# Patient Record
Sex: FEMALE | Race: BLACK OR AFRICAN AMERICAN | Employment: FULL TIME | URBAN - METROPOLITAN AREA
[De-identification: names, ages, dates, MRNs, and addresses within clinical notes are randomized per-mention and may not be internally consistent; named-entity substitution may affect disease eponyms.]

---

## 2017-03-07 ENCOUNTER — OFFICE VISIT (OUTPATIENT)
Dept: FAMILY MEDICINE CLINIC | Age: 52
End: 2017-03-07

## 2017-03-07 VITALS
OXYGEN SATURATION: 99 % | DIASTOLIC BLOOD PRESSURE: 80 MMHG | SYSTOLIC BLOOD PRESSURE: 136 MMHG | BODY MASS INDEX: 32.75 KG/M2 | WEIGHT: 241.8 LBS | RESPIRATION RATE: 18 BRPM | HEIGHT: 72 IN | TEMPERATURE: 97.7 F | HEART RATE: 55 BPM

## 2017-03-07 DIAGNOSIS — E03.9 HYPOTHYROIDISM, ADULT: Primary | ICD-10-CM

## 2017-03-07 DIAGNOSIS — K21.9 GASTROESOPHAGEAL REFLUX DISEASE WITHOUT ESOPHAGITIS: ICD-10-CM

## 2017-03-07 DIAGNOSIS — E78.5 DYSLIPIDEMIA: ICD-10-CM

## 2017-03-07 DIAGNOSIS — E55.9 VITAMIN D DEFICIENCY: ICD-10-CM

## 2017-03-07 NOTE — MR AVS SNAPSHOT
Visit Information     Date & Time Provider Department Dept. Phone Encounter #    3/7/2017 10:20 AM Phill Vergara MD [address] [place] [address] [id number]      Follow-up Instructions     Return in about 6 months (around 9/7/2017) for Follow-up, Fasting, CHOL, Thyroid. Upcoming Health Maintenance        Date Due    Hepatitis C Screening 1965    DTaP/Tdap/Td series (1 - Tdap) 1/1/1984    PAP AKA CERVICAL CYTOLOGY 1/1/1984    FOBT Q 1 YEAR AGE 50-75 1/1/2013    INFLUENZA AGE 9 TO ADULT 8/1/2016    BREAST CANCER SCRN MAMMOGRAM 12/28/2017      Allergies as of 3/7/2017  Review Complete On: 3/7/2017 By: Anika Hawkins MD    No Known Allergies      Current Immunizations  Reviewed on 10/31/2012    Name Date    Influenza Vaccine Split 10/31/2012       Not reviewed this visit   You Were Diagnosed With        Codes Comments    Hypothyroidism, adult    -  Primary ICD-10-CM: E03.9  ICD-9-CM: 263. 9     Dyslipidemia     ICD-10-CM: E78.5  ICD-9-CM: 272.4     Gastroesophageal reflux disease without esophagitis     ICD-10-CM: K21.9  ICD-9-CM: 530.81     Vitamin D deficiency     ICD-10-CM: E55.9  ICD-9-CM: 268.9       Vitals     BP Pulse Temp Resp Height(growth percentile) Weight(growth percentile)    136/80 (BP 1 Location: Left arm, BP Patient Position: Sitting) (!) 55 97.7 °F (36.5 °C) (Oral) 18 6' 4\" (1.93 m) 241 lb 12.8 oz (109.7 kg)    SpO2 BMI OB Status Smoking Status          99% 29.43 kg/m2 Postmenopausal Never Smoker      Vitals History      BMI and BSA Data     Body Mass Index Body Surface Area    29.43 kg/m 2 2.43 m 2         Preferred Pharmacy       Pharmacy Name Phone    [address] [phone]         Your Updated Medication List          This list is accurate as of: 3/7/17 10:55 AM.  Always use your most recent med list.                diclofenac EC 75 mg EC tablet   Commonly known as:  VOLTAREN   Take 1 Tab by mouth two (2) times daily (with meals). levothyroxine 75 mcg tablet   Commonly known as:  SYNTHROID   Take 1 Tab by mouth Daily (before breakfast). omeprazole 20 mg capsule   Commonly known as:  PRILOSEC   Take 1 Cap by mouth Daily (before breakfast). simvastatin 40 mg tablet   Commonly known as:  ZOCOR   TAKE ONE TABLET BY MOUTH NIGHTLY. VITAMIN D3 2,000 unit Tab   Generic drug:  cholecalciferol (vitamin D3)   Take  by mouth. Follow-up Instructions     Return in about 6 months (around 9/7/2017) for Follow-up, Fasting, CHOL, Thyroid. Patient Instructions         High Cholesterol: Care Instructions  Your Care Instructions  Cholesterol is a type of fat in your blood. It is needed for many body functions, such as making new cells. Cholesterol is made by your body. It also comes from food you eat. High cholesterol means that you have too much of the fat in your blood. This raises your risk of a heart attack and stroke. LDL and HDL are part of your total cholesterol. LDL is the \"bad\" cholesterol. High LDL can raise your risk for heart disease, heart attack, and stroke. HDL is the \"good\" cholesterol. It helps clear bad cholesterol from the body. High HDL is linked with a lower risk of heart disease, heart attack, and stroke. Your cholesterol levels help your doctor find out your risk for having a heart attack or stroke. You and your doctor can talk about whether you need to lower your risk and what treatment is best for you. A heart-healthy lifestyle along with medicines can help lower your cholesterol and your risk. The way you choose to lower your risk will depend on how high your risk is for heart attack and stroke. It will also depend on how you feel about taking medicines. Follow-up care is a key part of your treatment and safety. Be sure to make and go to all appointments, and call your doctor if you are having problems.  It's also a good idea to know your test results and keep a list of the medicines you take. How can you care for yourself at home? · Eat a variety of foods every day. Good choices include fruits, vegetables, whole grains (like oatmeal), dried beans and peas, nuts and seeds, soy products (like tofu), and fat-free or low-fat dairy products. · Replace butter, margarine, and hydrogenated or partially hydrogenated oils with olive and canola oils. (Canola oil margarine without trans fat is fine.)  · Replace red meat with fish, poultry, and soy protein (like tofu). · Limit processed and packaged foods like chips, crackers, and cookies. · Bake, broil, or steam foods. Don't ramirez them. · Be physically active. Get at least 30 minutes of exercise on most days of the week. Walking is a good choice. You also may want to do other activities, such as running, swimming, cycling, or playing tennis or team sports. · Stay at a healthy weight or lose weight by making the changes in eating and physical activity listed above. Losing just a small amount of weight, even 5 to 10 pounds, can reduce your risk for having a heart attack or stroke. · Do not smoke. When should you call for help? Watch closely for changes in your health, and be sure to contact your doctor if:  · You need help making lifestyle changes. · You have questions about your medicine. Where can you learn more? Go to http://tc-soniya.info/. Enter S176 in the search box to learn more about \"High Cholesterol: Care Instructions. \"  Current as of: January 27, 2016  Content Version: 11.1  © 5845-7076 [employer]. Care instructions adapted under license by [email or web address] (which disclaims liability or warranty for this information). If you have questions about a medical condition or this instruction, always ask your healthcare professional. [address] any warranty or liability for your use of this information.     PLEASE CONTINUE WITH  LEVOTHYROXINE AND SIMVASTATIN     STOP DICLOFENAC AND OMEPRAZOLE        Introducing [employer]! Jamaal Ace introduces Cibiemt patient portal. Now you can access parts of your medical record, email your doctor's office, and request medication refills online. 1. In your internet browser, go to [email or web address]   2. Click on the First Time User? Click Here link in the Sign In box. You will see the New Member Sign Up page. 3. Enter your CLEAR Access Code exactly as it appears below. You will not need to use this code after youve completed the sign-up process. If you do not sign up before the expiration date, you must request a new code. · CLEAR Access Code: [email or web address]  Expires: 6/5/2017 10:07 AM    4. Enter the last four digits of your Social Security Number (xxxx) and Date of Birth (mm/dd/yyyy) as indicated and click Submit. You will be taken to the next sign-up page. 5. Create a CLEAR ID. This will be your CLEAR login ID and cannot be changed, so think of one that is secure and easy to remember. 6. Create a CLEAR password. You can change your password at any time. 7. Enter your Password Reset Question and Answer. This can be used at a later time if you forget your password. 8. Enter your e-mail address. You will receive e-mail notification when new information is available in [address] 9. Click Sign Up. You can now view and download portions of your medical record. 10. Click the Download Summary menu link to download a portable copy of your medical information. If you have questions, please visit the Frequently Asked Questions section of the CLEAR website. Remember, CLEAR is NOT to be used for urgent needs. For medical emergencies, dial 911. Now available from your iPhone and Android! Please provide this summary of care documentation to your next provider. Your primary care clinician is listed as Shelly Varma.  If you have any questions after today's visit, please call [phone].

## 2017-03-07 NOTE — PROGRESS NOTES
HISTORY OF PRESENT ILLNESS  Erick Raymundo is a 45 y.o. female. Cholesterol Problem   The history is provided by the patient. This is a chronic problem. Progression since onset: On Simvastatin and she has been able to tolerate the medication without any notable side effects. Thyroid Problem   The history is provided by the patient. This is a chronic problem. Progression since onset: On thyroid replacement . weight has been steady and she has no other symptoms . Compliant with medications . Ear Fullness   The history is provided by the patient. This is a new problem. Progression since onset: had a cold 3 weeks ago associated with sore throat and left earache . Now has sense of fullness in left ear . Diet Concern   The history is provided by the patient. This is a chronic problem. Progression since onset: On Vit D supplementation . Review of Systems   Constitutional: Negative. HENT: Negative. Eyes: Negative. Respiratory: Negative. Cardiovascular: Negative. Gastrointestinal: Negative. Genitourinary: Negative. Musculoskeletal: Negative. Skin: Negative. Neurological: Negative. Endo/Heme/Allergies: Negative. Psychiatric/Behavioral: Negative. Physical Exam  General:   Head: Alert, cooperative, no distress, appears stated age. Normocephalic and atraumatic   Eyes:  Conjunctivae/corneas clear. PERRL, EOMs intact. Ears:  Normal TMs and external ear canals both ears. Nose: Nares normal. Septum midline. Mucosa normal. No drainage or sinus tenderness. Mouth:  Throat: Lips, mucosa, and tongue normal. Teeth and gums normal.  No lesions or exudates. Neck: Supple, symmetrical, trachea midline, no adenopathy, thyroid: no enlargement/tenderness/nodules. Back:   Symmetric, no curvature. ROM normal. No CVA tenderness. Lungs:   Clear to auscultation bilaterally. Heart:  Regular rate and rhythm, S1, S2 normal, no murmur, click, rub or gallop.    Abdomen:   Soft, non-tender. Bowel sounds normal. No masses,  No organomegaly. Extremities: Extremities normal, atraumatic, no cyanosis or edema. Pulses: 2+ and symmetric all extremities. Skin: Skin color, texture, turgor normal. No rashes or lesions   Lymph nodes: Cervical & supraclavicular nodes normal.   Neurologic: CNII-XII intact. Normal strength, sensation and reflexes throughout. Psych:                      Normal mood and affect     ASSESSMENT and PLAN  Encounter Diagnoses   Name Primary?  Hypothyroidism, adult Yes    Dyslipidemia     Gastroesophageal reflux disease without esophagitis     Vitamin D deficiency    Follow-up Disposition:  Return in about 6 months (around 9/7/2017) for Follow-up, Fasting, CHOL, Thyroid. Reviewed and discussed previous lab results. She was advised to continue with current medications. She was given an after visit summary which includes diagnoses, vital signs, current medications, &  authorized prescriptions. Patient verbalized understanding.

## 2017-03-07 NOTE — PATIENT INSTRUCTIONS

## 2017-03-07 NOTE — PROGRESS NOTES
Irais Goodson is a 45 y.o. female  Chief Complaint   Patient presents with    Cholesterol Problem    Thyroid Problem    Vitamin D Deficiency     1. Have you been to the ER, urgent care clinic since your last visit? Hospitalized since your last visit? No    2. Have you seen or consulted any other health care providers outside of the [address] [place] [address] since your last visit? Include any pap smears or colon screening.   No

## 2017-05-18 ENCOUNTER — TELEPHONE (OUTPATIENT)
Dept: FAMILY MEDICINE CLINIC | Age: 52
End: 2017-05-18

## 2017-05-18 NOTE — TELEPHONE ENCOUNTER
Shania Vargas  [phone]    Patient states that she was sent home from work because she was having  chills and her boss thinks that she has a fever. She states that she has a cold and her nose is running. Patient was informed that Dr. Vernon Mcdonald schedule is full today and referred to the [facility]. Patient declined going to the [facility] and asked that Dr. Elan Machado or her nurse call her. Patient speaks broken english.

## 2017-05-18 NOTE — TELEPHONE ENCOUNTER
Writer returned call to patient, advised patient of no available appointments for today. Requested patient to call the office tomorrow morning at 7 am for a possible same day appointment, patient understood and agreed to understanding.

## 2017-05-19 ENCOUNTER — OFFICE VISIT (OUTPATIENT)
Dept: FAMILY MEDICINE CLINIC | Age: 52
End: 2017-05-19

## 2017-05-19 VITALS
BODY MASS INDEX: 32.29 KG/M2 | TEMPERATURE: 98.7 F | HEART RATE: 68 BPM | RESPIRATION RATE: 18 BRPM | OXYGEN SATURATION: 99 % | SYSTOLIC BLOOD PRESSURE: 116 MMHG | WEIGHT: 238.4 LBS | HEIGHT: 72 IN | DIASTOLIC BLOOD PRESSURE: 72 MMHG

## 2017-05-19 DIAGNOSIS — E55.9 VITAMIN D DEFICIENCY: ICD-10-CM

## 2017-05-19 DIAGNOSIS — J02.9 PHARYNGITIS, UNSPECIFIED ETIOLOGY: Primary | ICD-10-CM

## 2017-05-19 DIAGNOSIS — Z29.8 NEED FOR MALARIA PROPHYLAXIS: ICD-10-CM

## 2017-05-19 DIAGNOSIS — E03.9 HYPOTHYROIDISM, ADULT: ICD-10-CM

## 2017-05-19 DIAGNOSIS — M25.561 BILATERAL ANTERIOR KNEE PAIN: ICD-10-CM

## 2017-05-19 DIAGNOSIS — E78.5 HYPERLIPIDEMIA LDL GOAL <100: ICD-10-CM

## 2017-05-19 DIAGNOSIS — M25.562 BILATERAL ANTERIOR KNEE PAIN: ICD-10-CM

## 2017-05-19 RX ORDER — MEFLOQUINE HYDROCHLORIDE 250 MG/1
250 TABLET ORAL
Qty: 10 TAB | Refills: 0 | Status: SHIPPED | OUTPATIENT
Start: 2017-05-19 | End: 2018-02-07 | Stop reason: ALTCHOICE

## 2017-05-19 RX ORDER — NAPROXEN 500 MG/1
500 TABLET ORAL 2 TIMES DAILY WITH MEALS
Qty: 60 TAB | Refills: 1 | Status: SHIPPED | OUTPATIENT
Start: 2017-05-19 | End: 2018-02-07 | Stop reason: ALTCHOICE

## 2017-05-19 NOTE — MR AVS SNAPSHOT
Visit Information     Date & Time Provider Department Dept. Phone Encounter #    5/19/2017 12:15 PM Randy Wynn  [facility] [phone] [id number]      Upcoming Health Maintenance        Date Due    Hepatitis C Screening 1965    DTaP/Tdap/Td series (1 - Tdap) 1/1/1984    PAP AKA CERVICAL CYTOLOGY 1/1/1984    FOBT Q 1 YEAR AGE 50-75 1/1/2013    INFLUENZA AGE 9 TO ADULT 8/1/2017    BREAST CANCER SCRN MAMMOGRAM 12/28/2017      Allergies as of 5/19/2017  Review Complete On: 5/19/2017 By: Slava Nunez LPN    No Known Allergies      Current Immunizations  Reviewed on 10/31/2012    Name Date    Influenza Vaccine Split 10/31/2012       Not reviewed this visit   Vitals     BP Pulse Temp Resp Height(growth percentile) Weight(growth percentile)    116/72 (BP 1 Location: Left arm, BP Patient Position: Sitting) 68 98.7 °F (37.1 °C) (Oral) 18 6' 4\" (1.93 m) 238 lb 6.4 oz (108.1 kg)    SpO2 BMI OB Status Smoking Status          99% 29.02 kg/m2 Postmenopausal Never Smoker        BMI and BSA Data     Body Mass Index Body Surface Area    29.02 kg/m 2 2.41 m 2         Preferred Pharmacy       Pharmacy Name Phone    [address] [phone]         Your Updated Medication List          This list is accurate as of: 5/19/17  1:22 PM.  Always use your most recent med list.                diclofenac EC 75 mg EC tablet   Commonly known as:  VOLTAREN   Take 1 Tab by mouth two (2) times daily (with meals). levothyroxine 75 mcg tablet   Commonly known as:  SYNTHROID   Take 1 Tab by mouth Daily (before breakfast). omeprazole 20 mg capsule   Commonly known as:  PRILOSEC   Take 1 Cap by mouth Daily (before breakfast). simvastatin 40 mg tablet   Commonly known as:  ZOCOR   TAKE ONE TABLET BY MOUTH NIGHTLY. VITAMIN D3 2,000 unit Tab   Generic drug:  cholecalciferol (vitamin D3)   Take  by mouth.                Patient Instructions         Back Pain: Care Instructions  Your Care Instructions    Back pain has many possible causes. It is often related to problems with muscles and ligaments of the back. It may also be related to problems with the nerves, discs, or bones of the back. Moving, lifting, standing, sitting, or sleeping in an awkward way can strain the back. Sometimes you don't notice the injury until later. Arthritis is another common cause of back pain. Although it may hurt a lot, back pain usually improves on its own within several weeks. Most people recover in 12 weeks or less. Using good home treatment and being careful not to stress your back can help you feel better sooner. Follow-up care is a key part of your treatment and safety. Be sure to make and go to all appointments, and call your doctor if you are having problems. Its also a good idea to know your test results and keep a list of the medicines you take. How can you care for yourself at home? · Sit or lie in positions that are most comfortable and reduce your pain. Try one of these positions when you lie down:  ¨ Lie on your back with your knees bent and supported by large pillows. ¨ Lie on the floor with your legs on the seat of a sofa or chair. Karla Orn on your side with your knees and hips bent and a pillow between your legs. ¨ Lie on your stomach if it does not make pain worse. · Do not sit up in bed, and avoid soft couches and twisted positions. Bed rest can help relieve pain at first, but it delays healing. Avoid bed rest after the first day of back pain. · Change positions every 30 minutes. If you must sit for long periods of time, take breaks from sitting. Get up and walk around, or lie in a comfortable position. · Try using a heating pad on a low or medium setting for 15 to 20 minutes every 2 or 3 hours. Try a warm shower in place of one session with the heating pad. · You can also try an ice pack for 10 to 15 minutes every 2 to 3 hours.  Put a thin cloth between the ice pack and your skin. · Take pain medicines exactly as directed. ¨ If the doctor gave you a prescription medicine for pain, take it as prescribed. ¨ If you are not taking a prescription pain medicine, ask your doctor if you can take an over-the-counter medicine. · Take short walks several times a day. You can start with 5 to 10 minutes, 3 or 4 times a day, and work up to longer walks. Walk on level surfaces and avoid hills and stairs until your back is better. · Return to work and other activities as soon as you can. Continued rest without activity is usually not good for your back. · To prevent future back pain, do exercises to stretch and strengthen your back and stomach. Learn how to use good posture, safe lifting techniques, and proper body mechanics. When should you call for help? Call your doctor now or seek immediate medical care if:  · You have new or worsening numbness in your legs. · You have new or worsening weakness in your legs. (This could make it hard to stand up.)  · You lose control of your bladder or bowels. Watch closely for changes in your health, and be sure to contact your doctor if:  · Your pain gets worse. · You are not getting better after 2 weeks. Where can you learn more? Go to http://tc-soniya.info/. Enter O333 in the search box to learn more about \"Back Pain: Care Instructions. \"  Current as of: May 23, 2016  Content Version: 11.2  © 4750-6219 [employer]. Care instructions adapted under license by [employer] (which disclaims liability or warranty for this information). If you have questions about a medical condition or this instruction, always ask your healthcare professional. [address] any warranty or liability for your use of this information. Introducing [facility]!      Marcial Clark introduces VR1 patient portal. Now you can access parts of your medical record, email your doctor's office, and request medication refills online. 1. In your internet browser, go to [email or web address]   2. Click on the First Time User? Click Here link in the Sign In box. You will see the New Member Sign Up page. 3. Enter your Meilishuo Access Code exactly as it appears below. You will not need to use this code after youve completed the sign-up process. If you do not sign up before the expiration date, you must request a new code. · Meilishuo Access Code: [email or web address]  Expires: 6/5/2017 11:07 AM    4. Enter the last four digits of your Social Security Number (xxxx) and Date of Birth (mm/dd/yyyy) as indicated and click Submit. You will be taken to the next sign-up page. 5. Create a Meilishuo ID. This will be your Meilishuo login ID and cannot be changed, so think of one that is secure and easy to remember. 6. Create a Meilishuo password. You can change your password at any time. 7. Enter your Password Reset Question and Answer. This can be used at a later time if you forget your password. 8. Enter your e-mail address. You will receive e-mail notification when new information is available in [address] 9. Click Sign Up. You can now view and download portions of your medical record. 10. Click the Download Summary menu link to download a portable copy of your medical information. If you have questions, please visit the Frequently Asked Questions section of the Meilishuo website. Remember, Meilishuo is NOT to be used for urgent needs. For medical emergencies, dial 911. Now available from your iPhone and Android! Please provide this summary of care documentation to your next provider. Your primary care clinician is listed as Avani Howard. If you have any questions after today's visit, please call [phone].

## 2017-05-19 NOTE — PROGRESS NOTES
HISTORY OF PRESENT ILLNESS  HPI  Erin Dotson is a 45 y.o. Female with history of hypothyroidism, hyperlipidemia, and vitamin D deficiency who presents to office today for sore throat. Pt complains of sore throat, congestion, and rhinorrhea since 5/17/17, and she states that she had a fever yesterday. She has been taking DayQuil, NyQuil, and Tylenol with relief. Pt denies cough. She also complains of left abdominal pain that is not aggravated by movement or palpation and is worse when sitting down, as well as mid-back pain; these both started around the same time as her sore throat. She complains of bilateral knee pain for the past two weeks that is aggravated by standing up and walking and is worse right after waking up in the morning. She notes that she exercises twice a week, usually with walking, but that her activity level has not changed recently. Past Medical History:   Diagnosis Date    Anemia     resolved after tx     H. PYLORI INFECTION     tx in  2008 no stool Ag to confirm tx    Hypercholesterolemia     Hypertension      Past Surgical History:   Procedure Laterality Date    HX APPENDECTOMY       Current Outpatient Prescriptions on File Prior to Visit   Medication Sig Dispense Refill    levothyroxine (SYNTHROID) 75 mcg tablet Take 1 Tab by mouth Daily (before breakfast). 30 Tab 12    simvastatin (ZOCOR) 40 mg tablet TAKE ONE TABLET BY MOUTH NIGHTLY. 30 Tab 12    diclofenac EC (VOLTAREN) 75 mg EC tablet Take 1 Tab by mouth two (2) times daily (with meals). 60 Tab 6    omeprazole (PRILOSEC) 20 mg capsule Take 1 Cap by mouth Daily (before breakfast). 30 Cap 6    cholecalciferol, vitamin D3, (VITAMIN D3) 2,000 unit tab Take  by mouth. No current facility-administered medications on file prior to visit. No Known Allergies  History reviewed. No pertinent family history.   Social History     Social History    Marital status:      Spouse name: N/A    Number of children: N/A    Years of education: N/A     Social History Main Topics    Smoking status: Never Smoker    Smokeless tobacco: None    Alcohol use No    Drug use: No    Sexual activity: Not Asked     Other Topics Concern    None     Social History Narrative    ** Merged History Encounter **                  Review of Systems   Constitutional: Negative for chills, diaphoresis, fever, malaise/fatigue and weight loss. HENT: Positive for congestion and sore throat. Rhinorrhea   Eyes: Negative for blurred vision, double vision, pain and redness. Respiratory: Negative for cough, shortness of breath and wheezing. Cardiovascular: Negative for chest pain, palpitations, orthopnea, claudication, leg swelling and PND. Gastrointestinal: Positive for abdominal pain (left). Musculoskeletal: Positive for back pain (middle) and joint pain (bilateral knees). Skin: Negative for itching and rash. Neurological: Negative for dizziness, tingling, tremors, sensory change, speech change, focal weakness, seizures, loss of consciousness, weakness and headaches. Results for orders placed or performed in visit on 10/28/16   CBC WITH AUTOMATED DIFF   Result Value Ref Range    WBC 3.7 3.4 - 10.8 x10E3/uL    RBC 3.94 3.77 - 5.28 x10E6/uL    HGB 12.1 11.1 - 15.9 g/dL    HCT 37.0 34.0 - 46.6 %    MCV 94 79 - 97 fL    MCH 30.7 26.6 - 33.0 pg    MCHC 32.7 31.5 - 35.7 g/dL    RDW 13.5 12.3 - 15.4 %    PLATELET 645 132 - 381 x10E3/uL    NEUTROPHILS 42 %    Lymphocytes 41 %    MONOCYTES 9 %    EOSINOPHILS 7 %    BASOPHILS 1 %    ABS. NEUTROPHILS 1.6 1.4 - 7.0 x10E3/uL    Abs Lymphocytes 1.5 0.7 - 3.1 x10E3/uL    ABS. MONOCYTES 0.4 0.1 - 0.9 x10E3/uL    ABS. EOSINOPHILS 0.3 0.0 - 0.4 x10E3/uL    ABS. BASOPHILS 0.0 0.0 - 0.2 x10E3/uL    IMMATURE GRANULOCYTES 0 %    ABS. IMM.  GRANS. 0.0 0.0 - 0.1 [id number]   METABOLIC PANEL, COMPREHENSIVE   Result Value Ref Range    Glucose 86 65 - 99 mg/dL    BUN 9 6 - 24 mg/dL    Creatinine 0.66 0.57 - 1.00 mg/dL    GFR est non- >59 mL/min/1.73    GFR est  >59 mL/min/1.73    BUN/Creatinine ratio 14 9 - 23    Sodium 141 136 - 144 mmol/L    Potassium 4.6 3.5 - 5.2 mmol/L    Chloride 104 97 - 106 mmol/L    CO2 27 18 - 29 mmol/L    Calcium 9.0 8.7 - 10.2 mg/dL    Protein, total 6.5 6.0 - 8.5 g/dL    Albumin 4.1 3.5 - 5.5 g/dL    GLOBULIN, TOTAL 2.4 1.5 - 4.5 g/dL    A-G Ratio 1.7 1.1 - 2.5    Bilirubin, total 0.9 0.0 - 1.2 mg/dL    Alk. phosphatase 69 39 - 117 IU/L    AST (SGOT) 13 0 - 40 IU/L    ALT (SGPT) 8 0 - 32 IU/L   LIPID PANEL   Result Value Ref Range    Cholesterol, total 156 100 - 199 mg/dL    Triglyceride 43 0 - 149 mg/dL    HDL Cholesterol 69 >39 mg/dL    VLDL, calculated 9 5 - 40 mg/dL    LDL, calculated 78 0 - 99 mg/dL   TSH 3RD GENERATION   Result Value Ref Range    TSH 2.430 0.450 - 4.500 uIU/mL   CVD REPORT   Result Value Ref Range    INTERPRETATION Note                Physical Exam  Visit Vitals    /72 (BP 1 Location: Left arm, BP Patient Position: Sitting)    Pulse 68    Temp 98.7 °F (37.1 °C) (Oral)    Resp 18    Ht 6' 4\" (1.93 m)    Wt 238 lb 6.4 oz (108.1 kg)    SpO2 99%    BMI 29.02 kg/m2     Eyes: conjunctivae/corneas clear. PERRL, EOM's intact. Fundi benign  Ears: normal TM's and external ear canals AU  Nose: Nares normal. Septum midline. moderate congestion and watery drainage  Throat: Very mild erythema of the posterior pharynx  Lungs: clear to auscultation bilaterally  Heart: regular rate and rhythm, S1, S2 normal, no murmur, click, rub or gallop  Knees: both appear to be normal without any swelling or erythema, but there is some mild discomfort to full extension against resistance, and the left more than right knee has a moderate amount of crepitance with extension of the knee to its fullest            ASSESSMENT and PLAN    ICD-10-CM ICD-9-CM    1. Pharyngitis, unspecified etiology J02.9 462    2.  Need for malaria prophylaxis Z29.8 V07.8 mefloquine (LARIAM) 250 mg tablet   3. Bilateral anterior knee pain M25.561 719.46 naproxen (NAPROSYN) 500 mg tablet    M25.562     4. Vitamin D deficiency E55.9 268.9    5. Hypothyroidism, adult E03.9 244.9    6. Hyperlipidemia LDL goal <100 E78.5 272.4      Alo was seen today for cold symptoms and back pain. Diagnoses and all orders for this visit:    Pharyngitis, unspecified etiology    Need for malaria prophylaxis  -     mefloquine (LARIAM) 250 mg tablet; Take 250 mg by mouth every seven (7) days. Take with food and at least 8 ounces of water; start one week before trip and continue until 4 weeks after back from  trip    Bilateral anterior knee pain  -     naproxen (NAPROSYN) 500 mg tablet; Take 1 Tab by mouth two (2) times daily (with meals). As needed for knee pain    Vitamin D deficiency    Hypothyroidism, adult    Hyperlipidemia LDL goal <100      Follow-up Disposition:  Return if sore throat or knee pain/symptoms worsen or fail to improve. reviewed medications and side effects in detail  Please call my office if there are any questions- [phone]. Discussed expected course/resolution/complications of diagnosis in detail with patient. Medication risks/benefits/costs/interactions/alternatives discussed with patient. Pt was given an after visit summary which includes diagnoses, current medications & vitals. Pt expressed understanding with the diagnosis and plan. Patient to call if no better in 3 -4 days and prn new problems. For her congestion, I recommended she try an antihistamine, either Allegra 180mg, Claritin 10mg, or Zyrtec 10mg. For her knees, I recommended that long-term she look at cycling as a way to help them. I suggested water aerobics, but she's not a swimmer. We'll give her naproxen 500mg prn, but she should use this sparingly and check her kidney function if she starts taking it regularly.      She also asked for malaria prophylaxis, and she states that she wants the type of malaria treatment that's once a week. We gave her mefloquine 250mg weekly starting a week before she leaves and for four weeks after she comes back. This document was written by Kael Montes, as dictated by Arturo Felix MD.  I have reviewed and agree with the above note and have made corrections where appropriate Vaibhav Jean Baptiste M.D.

## 2017-05-19 NOTE — PROGRESS NOTES
Chief Complaint   Patient presents with    Cold Symptoms     Patient had fever yesterday, Patient has sore throat and runny nose as well    Back Pain     Patient has back pain in the middle of the back. 1. Have you been to the ER, urgent care clinic since your last visit? Hospitalized since your last visit? No    2. Have you seen or consulted any other health care providers outside of the [address] [place] [address] since your last visit? Include any pap smears or colon screening.  No

## 2017-05-19 NOTE — PATIENT INSTRUCTIONS
Back Pain: Care Instructions  Your Care Instructions    Back pain has many possible causes. It is often related to problems with muscles and ligaments of the back. It may also be related to problems with the nerves, discs, or bones of the back. Moving, lifting, standing, sitting, or sleeping in an awkward way can strain the back. Sometimes you don't notice the injury until later. Arthritis is another common cause of back pain. Although it may hurt a lot, back pain usually improves on its own within several weeks. Most people recover in 12 weeks or less. Using good home treatment and being careful not to stress your back can help you feel better sooner. Follow-up care is a key part of your treatment and safety. Be sure to make and go to all appointments, and call your doctor if you are having problems. Its also a good idea to know your test results and keep a list of the medicines you take. How can you care for yourself at home? · Sit or lie in positions that are most comfortable and reduce your pain. Try one of these positions when you lie down:  ¨ Lie on your back with your knees bent and supported by large pillows. ¨ Lie on the floor with your legs on the seat of a sofa or chair. Patria Ankit on your side with your knees and hips bent and a pillow between your legs. ¨ Lie on your stomach if it does not make pain worse. · Do not sit up in bed, and avoid soft couches and twisted positions. Bed rest can help relieve pain at first, but it delays healing. Avoid bed rest after the first day of back pain. · Change positions every 30 minutes. If you must sit for long periods of time, take breaks from sitting. Get up and walk around, or lie in a comfortable position. · Try using a heating pad on a low or medium setting for 15 to 20 minutes every 2 or 3 hours. Try a warm shower in place of one session with the heating pad. · You can also try an ice pack for 10 to 15 minutes every 2 to 3 hours.  Put a thin cloth between the ice pack and your skin. · Take pain medicines exactly as directed. ¨ If the doctor gave you a prescription medicine for pain, take it as prescribed. ¨ If you are not taking a prescription pain medicine, ask your doctor if you can take an over-the-counter medicine. · Take short walks several times a day. You can start with 5 to 10 minutes, 3 or 4 times a day, and work up to longer walks. Walk on level surfaces and avoid hills and stairs until your back is better. · Return to work and other activities as soon as you can. Continued rest without activity is usually not good for your back. · To prevent future back pain, do exercises to stretch and strengthen your back and stomach. Learn how to use good posture, safe lifting techniques, and proper body mechanics. When should you call for help? Call your doctor now or seek immediate medical care if:  · You have new or worsening numbness in your legs. · You have new or worsening weakness in your legs. (This could make it hard to stand up.)  · You lose control of your bladder or bowels. Watch closely for changes in your health, and be sure to contact your doctor if:  · Your pain gets worse. · You are not getting better after 2 weeks. Where can you learn more? Go to http://tc-soniya.info/. Enter R884 in the search box to learn more about \"Back Pain: Care Instructions. \"  Current as of: May 23, 2016  Content Version: 11.2  © 7765-3391 [employer]wise[employer]. Care instructions adapted under license by [employer] (which disclaims liability or warranty for this information). If you have questions about a medical condition or this instruction, always ask your healthcare professional. [address] any warranty or liability for your use of this information.

## 2017-05-23 ENCOUNTER — TELEPHONE (OUTPATIENT)
Dept: FAMILY MEDICINE CLINIC | Age: 52
End: 2017-05-23

## 2017-05-23 NOTE — TELEPHONE ENCOUNTER
----- Message from Beatrice Mcnamara sent at 5/22/2017  5:27 PM EDT -----  Regarding: Dr. Kitty Day, with [facility], is requesting a call back. The Rx for Lariam 250 mg that was called in for the pt is not covered by the insurance. The pharmacy would like to know if they have permission to switch the medication. Best contact number [phone].

## 2017-05-23 NOTE — TELEPHONE ENCOUNTER
Spoke with pharmacy patient needs prior auth for Lariam 250 mg. Pharmacy faxed over prior auth to be completed.

## 2017-12-27 ENCOUNTER — HOSPITAL ENCOUNTER (OUTPATIENT)
Dept: MAMMOGRAPHY | Age: 52
Discharge: HOME | End: 2017-12-27
Attending: FAMILY MEDICINE
Payer: COMMERCIAL

## 2017-12-27 DIAGNOSIS — Z12.31 ENCOUNTER FOR SCREENING MAMMOGRAM FOR MALIGNANT NEOPLASM OF BREAST: ICD-10-CM

## 2017-12-27 PROCEDURE — 77067 SCR MAMMO BI INCL CAD: CPT

## 2018-02-07 ENCOUNTER — OFFICE VISIT (OUTPATIENT)
Dept: FAMILY MEDICINE CLINIC | Age: 53
End: 2018-02-07

## 2018-02-07 VITALS
BODY MASS INDEX: 32.91 KG/M2 | HEIGHT: 72 IN | HEART RATE: 78 BPM | TEMPERATURE: 97 F | DIASTOLIC BLOOD PRESSURE: 70 MMHG | WEIGHT: 243 LBS | OXYGEN SATURATION: 98 % | RESPIRATION RATE: 16 BRPM | SYSTOLIC BLOOD PRESSURE: 110 MMHG

## 2018-02-07 DIAGNOSIS — E66.3 OVER WEIGHT: ICD-10-CM

## 2018-02-07 DIAGNOSIS — Z00.00 ROUTINE GENERAL MEDICAL EXAMINATION AT A HEALTH CARE FACILITY: Primary | ICD-10-CM

## 2018-02-07 DIAGNOSIS — Z12.11 COLON CANCER SCREENING: ICD-10-CM

## 2018-02-07 RX ORDER — CYCLOBENZAPRINE HCL 5 MG
TABLET ORAL
COMMUNITY
End: 2018-02-07 | Stop reason: ALTCHOICE

## 2018-02-07 RX ORDER — CYCLOBENZAPRINE HCL 5 MG
TABLET ORAL
COMMUNITY
Start: 2018-01-09 | End: 2018-02-07 | Stop reason: ALTCHOICE

## 2018-02-07 RX ORDER — TIZANIDINE 2 MG/1
TABLET ORAL
COMMUNITY
Start: 2018-01-17 | End: 2018-02-07 | Stop reason: ALTCHOICE

## 2018-02-07 RX ORDER — IBUPROFEN 800 MG/1
TABLET ORAL
COMMUNITY
Start: 2018-01-17 | End: 2018-02-07 | Stop reason: ALTCHOICE

## 2018-02-07 RX ORDER — IBUPROFEN 800 MG/1
1 TABLET ORAL
COMMUNITY
End: 2018-06-07 | Stop reason: SDUPTHER

## 2018-02-07 RX ORDER — CYCLOBENZAPRINE HCL 5 MG
5 TABLET ORAL
Qty: 30 TAB | Refills: 0
Start: 2018-02-07 | End: 2018-06-07 | Stop reason: SDUPTHER

## 2018-02-07 NOTE — PATIENT INSTRUCTIONS
Well Visit, Women 48 to 72: Care Instructions  Your Care Instructions    Physical exams can help you stay healthy. Your doctor has checked your overall health and may have suggested ways to take good care of yourself. He or she also may have recommended tests. At home, you can help prevent illness with healthy eating, regular exercise, and other steps. Follow-up care is a key part of your treatment and safety. Be sure to make and go to all appointments, and call your doctor if you are having problems. It's also a good idea to know your test results and keep a list of the medicines you take. How can you care for yourself at home? · Reach and stay at a healthy weight. This will lower your risk for many problems, such as obesity, diabetes, heart disease, and high blood pressure. · Get at least 30 minutes of exercise on most days of the week. Walking is a good choice. You also may want to do other activities, such as running, swimming, cycling, or playing tennis or team sports. · Do not smoke. Smoking can make health problems worse. If you need help quitting, talk to your doctor about stop-smoking programs and medicines. These can increase your chances of quitting for good. · Protect your skin from too much sun. When you're outdoors from 10 a.m. to 4 p.m., stay in the shade or cover up with clothing and a hat with a wide brim. Wear sunglasses that block UV rays. Even when it's cloudy, put broad-spectrum sunscreen (SPF 30 or higher) on any exposed skin. · See a dentist one or two times a year for checkups and to have your teeth cleaned. · Wear a seat belt in the car. · Limit alcohol to 1 drink a day. Too much alcohol can cause health problems. Follow your doctor's advice about when to have certain tests. These tests can spot problems early. · Cholesterol.  Your doctor will tell you how often to have this done based on your age, family history, or other things that can increase your risk for heart attack and stroke. · Blood pressure. Have your blood pressure checked during a routine doctor visit. Your doctor will tell you how often to check your blood pressure based on your age, your blood pressure results, and other factors. · Mammogram. Ask your doctor how often you should have a mammogram, which is an X-ray of your breasts. A mammogram can spot breast cancer before it can be felt and when it is easiest to treat. · Pap test and pelvic exam. Ask your doctor how often you should have a Pap test. You may not need to have a Pap test as often as you used to. · Vision. Have your eyes checked every year or two or as often as your doctor suggests. Some experts recommend that you have yearly exams for glaucoma and other age-related eye problems starting at age 48. · Hearing. Tell your doctor if you notice any change in your hearing. You can have tests to find out how well you hear. · Diabetes. Ask your doctor whether you should have tests for diabetes. · Colon cancer. You should begin tests for colon cancer at age 48. You may have one of several tests. Your doctor will tell you how often to have tests based on your age and risk. Risks include whether you already had a precancerous polyp removed from your colon or whether your parents, sisters and brothers, or children have had colon cancer. · Thyroid disease. Talk to your doctor about whether to have your thyroid checked as part of a regular physical exam. Women have an increased chance of a thyroid problem. · Osteoporosis. You should begin tests for bone density at age 72. If you are younger than 72, ask your doctor whether you have factors that may increase your risk for this disease. You may want to have this test before age 72. · Heart attack and stroke risk. At least every 4 to 6 years, you should have your risk for heart attack and stroke assessed.  Your doctor uses factors such as your age, blood pressure, cholesterol, and whether you smoke or have diabetes to show what your risk for a heart attack or stroke is over the next 10 years. When should you call for help? Watch closely for changes in your health, and be sure to contact your doctor if you have any problems or symptoms that concern you. Where can you learn more? Go to http://tc-soniya.info/. Enter R743 in the search box to learn more about \"Well Visit, Women 50 to 72: Care Instructions. \"  Current as of: May 12, 2017  Content Version: 11.4  © 2472-1560 Healthwise, Incorporated. Care instructions adapted under license by [employer] (which disclaims liability or warranty for this information). If you have questions about a medical condition or this instruction, always ask your healthcare professional. [address] any warranty or liability for your use of this information.

## 2018-02-07 NOTE — MR AVS SNAPSHOT
[address] [address]  [phone]     Patient: Toña Marin  MRN: [id number]  :1965               Visit Information     Date & Time Provider Department Dept. Phone Encounter #    2018  2:40 PM Josias Baron  [facility] [phone] [id number]      Follow-up Instructions     Return in about 4 months (around 2018) for fasting, follow up. Upcoming Health Maintenance        Date Due    Hepatitis C Screening 1965    DTaP/Tdap/Td series (1 - Tdap) 1984    FOBT Q 1 YEAR AGE 50-75 2013    BREAST CANCER SCRN MAMMOGRAM 2019    PAP AKA CERVICAL CYTOLOGY 2020      Allergies as of 2018  Review Complete On: 2018 By: Casandra Tapia LPN    No Known Allergies      Current Immunizations  Reviewed on 10/31/2012    Name Date    Influenza Vaccine Split 10/31/2012       Not reviewed this visit   You Were Diagnosed With        Codes Comments    Routine general medical examination at a health care facility    -  Primary ICD-10-CM: Z00.00  ICD-9-CM: V70.0     Colon cancer screening     ICD-10-CM: Z12.11  ICD-9-CM: V76.51       Vitals     BP Pulse Temp Resp Height(growth percentile) Weight(growth percentile)    110/70 (BP 1 Location: Right arm, BP Patient Position: Sitting) 78 97 °F (36.1 °C) (Oral) 16 6' 4\" (1.93 m) 243 lb (110.2 kg)    SpO2 BMI OB Status Smoking Status          98% 29.58 kg/m2 Postmenopausal Never Smoker      Vitals History      BMI and BSA Data     Body Mass Index Body Surface Area    29.58 kg/m 2 2.43 m 2         Preferred Pharmacy       Pharmacy Name Phone    [address] [phone]         Your Updated Medication List          This list is accurate as of: 18  2:42 PM.  Always use your most recent med list.                cyclobenzaprine 5 mg tablet   Commonly known as:  FLEXERIL   Take 1 Tab by mouth nightly.        diclofenac EC 75 mg EC tablet   Commonly known as:  VOLTAREN   Take 1 Tab by mouth two (2) times daily (with meals). levothyroxine 75 mcg tablet   Commonly known as:  SYNTHROID   Take 1 Tab by mouth Daily (before breakfast). naproxen 500 mg tablet   Commonly known as:  NAPROSYN   Take 1 Tab by mouth two (2) times daily (with meals). As needed for knee pain       omeprazole 20 mg capsule   Commonly known as:  PRILOSEC   Take 1 Cap by mouth Daily (before breakfast). simvastatin 40 mg tablet   Commonly known as:  ZOCOR   TAKE ONE TABLET BY MOUTH NIGHTLY. VITAMIN D3 2,000 unit Tab   Generic drug:  cholecalciferol (vitamin D3)   Take  by mouth. We Performed the Following     CBC WITH AUTOMATED DIFF [11206 CPT(R)]     HEMOGLOBIN A1C WITH EAG [58864 CPT(R)]     HEPATITIS C AB [66230 CPT(R)]     LIPID PANEL [32260 CPT(R)]     METABOLIC PANEL, COMPREHENSIVE [48522 CPT(R)]     OCCULT BLOOD, IMMUNOASSAY (FIT) [84305 CPT(R)]     TSH AND FREE T4 [33456 CPT(R)]     URINALYSIS W/ RFLX MICROSCOPIC [35155 CPT(R)]       Follow-up Instructions     Return in about 4 months (around 6/7/2018) for fasting, follow up. Patient Instructions         Well Visit, Women 48 to 72: Care Instructions  Your Care Instructions    Physical exams can help you stay healthy. Your doctor has checked your overall health and may have suggested ways to take good care of yourself. He or she also may have recommended tests. At home, you can help prevent illness with healthy eating, regular exercise, and other steps. Follow-up care is a key part of your treatment and safety. Be sure to make and go to all appointments, and call your doctor if you are having problems. It's also a good idea to know your test results and keep a list of the medicines you take. How can you care for yourself at home? · Reach and stay at a healthy weight.  This will lower your risk for many problems, such as obesity, diabetes, heart disease, and high blood pressure. · Get at least 30 minutes of exercise on most days of the week. Walking is a good choice. You also may want to do other activities, such as running, swimming, cycling, or playing tennis or team sports. · Do not smoke. Smoking can make health problems worse. If you need help quitting, talk to your doctor about stop-smoking programs and medicines. These can increase your chances of quitting for good. · Protect your skin from too much sun. When you're outdoors from 10 a.m. to 4 p.m., stay in the shade or cover up with clothing and a hat with a wide brim. Wear sunglasses that block UV rays. Even when it's cloudy, put broad-spectrum sunscreen (SPF 30 or higher) on any exposed skin. · See a dentist one or two times a year for checkups and to have your teeth cleaned. · Wear a seat belt in the car. · Limit alcohol to 1 drink a day. Too much alcohol can cause health problems. Follow your doctor's advice about when to have certain tests. These tests can spot problems early. · Cholesterol. Your doctor will tell you how often to have this done based on your age, family history, or other things that can increase your risk for heart attack and stroke. · Blood pressure. Have your blood pressure checked during a routine doctor visit. Your doctor will tell you how often to check your blood pressure based on your age, your blood pressure results, and other factors. · Mammogram. Ask your doctor how often you should have a mammogram, which is an X-ray of your breasts. A mammogram can spot breast cancer before it can be felt and when it is easiest to treat. · Pap test and pelvic exam. Ask your doctor how often you should have a Pap test. You may not need to have a Pap test as often as you used to. · Vision. Have your eyes checked every year or two or as often as your doctor suggests. Some experts recommend that you have yearly exams for glaucoma and other age-related eye problems starting at age 48. · Hearing. Tell your doctor if you notice any change in your hearing. You can have tests to find out how well you hear. · Diabetes. Ask your doctor whether you should have tests for diabetes. · Colon cancer. You should begin tests for colon cancer at age 48. You may have one of several tests. Your doctor will tell you how often to have tests based on your age and risk. Risks include whether you already had a precancerous polyp removed from your colon or whether your parents, sisters and brothers, or children have had colon cancer. · Thyroid disease. Talk to your doctor about whether to have your thyroid checked as part of a regular physical exam. Women have an increased chance of a thyroid problem. · Osteoporosis. You should begin tests for bone density at age 72. If you are younger than 72, ask your doctor whether you have factors that may increase your risk for this disease. You may want to have this test before age 72. · Heart attack and stroke risk. At least every 4 to 6 years, you should have your risk for heart attack and stroke assessed. Your doctor uses factors such as your age, blood pressure, cholesterol, and whether you smoke or have diabetes to show what your risk for a heart attack or stroke is over the next 10 years. When should you call for help? Watch closely for changes in your health, and be sure to contact your doctor if you have any problems or symptoms that concern you. Where can you learn more? Go to http://tc-soniya.info/. Enter R224 in the search box to learn more about \"Well Visit, Women 50 to 72: Care Instructions. \"  Current as of: May 12, 2017  Content Version: 11.4  © 0687-3712 Health[employer], Incorporated. Care instructions adapted under license by [employer] (which disclaims liability or warranty for this information).  If you have questions about a medical condition or this instruction, always ask your healthcare professional. [employer] disclaims any warranty or liability for your use of this information. Introducing [facility]! Arpit Mason introduces [employer] patient portal. Now you can access parts of your medical record, email your doctor's office, and request medication refills online. 1. In your internet browser, go to [email or web address]   2. Click on the First Time User? Click Here link in the Sign In box. You will see the New Member Sign Up page. 3. Enter your [employer] Access Code exactly as it appears below. You will not need to use this code after youve completed the sign-up process. If you do not sign up before the expiration date, you must request a new code. · [employer] Access Code: [email or web address]  Expires: 5/8/2018  2:42 PM    4. Enter the last four digits of your Social Security Number (xxxx) and Date of Birth (mm/dd/yyyy) as indicated and click Submit. You will be taken to the next sign-up page. 5. Create a [employer] ID. This will be your [employer] login ID and cannot be changed, so think of one that is secure and easy to remember. 6. Create a [employer] password. You can change your password at any time. 7. Enter your Password Reset Question and Answer. This can be used at a later time if you forget your password. 8. Enter your e-mail address. You will receive e-mail notification when new information is available in [address] 9. Click Sign Up. You can now view and download portions of your medical record. 10. Click the Download Summary menu link to download a portable copy of your medical information. If you have questions, please visit the Frequently Asked Questions section of the [employer] website. Remember, [employer] is NOT to be used for urgent needs. For medical emergencies, dial 911. Now available from your iPhone and Android! Please provide this summary of care documentation to your next provider. Your primary care clinician is listed as Zach Bashir.  If you have any questions after today's visit, please call [phone].

## 2018-02-07 NOTE — PROGRESS NOTES
HISTORY OF PRESENT ILLNESS  James Echeverria is a 52 y.o. female. She is a new patient and is here to establish care. Previous followed by Dr Joey Ortiz. The following sections were reviewed & updated as appropriate: PMH, PL, PSH, and SH. She was seen for complete physical.  Patient following Dr Haroon Keith and PT for left shoulder pain,   Has stopped all her meds as she was out of Rx and has not established care with new provider. HPI  Health Maintenance  Immunizations:    Influenza: she declines. Tetanus: unknown, record requested. Shingles: reviewed with the patient, will defer to age 59. Pneumonia: n/a. Cancer screening:    Cervical: reviewed guidelines, UTD, done by OBGYN, records requested. Breast: reviewed guidelines, reviewed SBE with her, UTD. Colon: reviewed screening modalities & elected for FOBT. Patient Care Team:  Brandy Vidal MD as PCP - General (Family Practice)  Sherry Cota MD (Orthopedic Surgery)      The following sections were reviewed & updated as appropriate: PMH, PSH, FH, and SH. Patient Active Problem List   Diagnosis Code    Anemia D64.9    Dyslipidemia E78.5    Vitamin D deficiency E55.9    Hypothyroidism, adult E03.9          Prior to Admission medications    Medication Sig Start Date End Date Taking? Authorizing Provider   cyclobenzaprine (FLEXERIL) 5 mg tablet Take 1 Tab by mouth nightly. 2/7/18  Yes Brandy Vidal MD   cholecalciferol, vitamin D3, (VITAMIN D3) 2,000 unit tab Take  by mouth. Yes Historical Provider   naproxen (NAPROSYN) 500 mg tablet Take 1 Tab by mouth two (2) times daily (with meals). As needed for knee pain 5/19/17   Celina Elliott MD   levothyroxine (SYNTHROID) 75 mcg tablet Take 1 Tab by mouth Daily (before breakfast). 10/28/16   Dereje Gupta MD   simvastatin (ZOCOR) 40 mg tablet TAKE ONE TABLET BY MOUTH NIGHTLY.  10/28/16   Dereje Gupta MD   diclofenac EC (VOLTAREN) 75 mg EC tablet Take 1 Tab by mouth two (2) times daily (with meals). 10/28/16   Pauline Sam MD   omeprazole (PRILOSEC) 20 mg capsule Take 1 Cap by mouth Daily (before breakfast). 10/28/16   Pauline Sam MD          No Known Allergies     Review of Systems   Constitutional: Negative for chills, fever and malaise/fatigue. HENT: Negative for congestion, ear pain, sore throat and tinnitus. Eyes: Negative for blurred vision, double vision, pain and discharge. Respiratory: Negative for cough, shortness of breath and wheezing. Cardiovascular: Negative for chest pain, palpitations and leg swelling. Gastrointestinal: Negative for abdominal pain, blood in stool, constipation, diarrhea, nausea and vomiting. Genitourinary: Negative for dysuria, frequency, hematuria and urgency. Musculoskeletal: Negative for back pain, joint pain and myalgias. Left shoulder pain   Skin: Negative for rash. Neurological: Negative for dizziness, tremors, seizures and headaches. Endo/Heme/Allergies: Negative for polydipsia. Does not bruise/bleed easily. Psychiatric/Behavioral: Negative for depression and substance abuse. The patient is not nervous/anxious. Physical Exam   Constitutional: She is oriented to person, place, and time. She appears well-developed and well-nourished. HENT:   Head: Normocephalic and atraumatic. Right Ear: External ear normal.   Left Ear: External ear normal.   Nose: Nose normal.   Mouth/Throat: Oropharynx is clear and moist.   Eyes: Conjunctivae and EOM are normal. Pupils are equal, round, and reactive to light. No scleral icterus. Neck: Normal range of motion. Neck supple. No JVD present. No thyromegaly present. Cardiovascular: Normal rate, regular rhythm, normal heart sounds and intact distal pulses. Exam reveals no gallop and no friction rub. No murmur heard. Pulmonary/Chest: Effort normal and breath sounds normal. She has no wheezes. She has no rales. She exhibits no tenderness.  Right breast exhibits no inverted nipple, no mass, no nipple discharge, no skin change and no tenderness. Left breast exhibits no inverted nipple, no mass, no nipple discharge, no skin change and no tenderness. Breasts are symmetrical.   Abdominal: Soft. Bowel sounds are normal. She exhibits no distension and no mass. There is no tenderness. Musculoskeletal: Normal range of motion. She exhibits no edema or tenderness. Lymphadenopathy:     She has no cervical adenopathy. Neurological: She is alert and oriented to person, place, and time. She has normal reflexes. No cranial nerve deficit. Skin: Skin is warm and dry. No rash noted. Psychiatric: She has a normal mood and affect. Her behavior is normal. Judgment and thought content normal.   Nursing note and vitals reviewed. ASSESSMENT and PLAN  Diagnoses and all orders for this visit:    1. Routine general medical examination at a health care facility  -     CBC WITH AUTOMATED DIFF  -     METABOLIC PANEL, COMPREHENSIVE  -     HEMOGLOBIN A1C WITH EAG  -     TSH AND FREE T4  -     LIPID PANEL  -     URINALYSIS W/ RFLX MICROSCOPIC  -     HEPATITIS C AB    2. Colon cancer screening  -     OCCULT BLOOD, IMMUNOASSAY (FIT)    3. Over weight: Discussed abnormal weight, ideal BMI and importance of diet and exercise to loose weight. Referral for exercise program was offered. Printed information about diet and lifestyle modification provided. Other orders  -     cyclobenzaprine (FLEXERIL) 5 mg tablet; Take 1 Tab by mouth nightly. Discussed lifestyle issues and health guidance given  Patient was given an after visit summary which includes diagnoses, vital signs, current medications, instructions and references & authorized prescriptions . Results of labs will be conveyed to patient, once available. Pt verbalized instructions I provided and expressed understanding of discussion that was held today.   Follow-up Disposition:  Return in about 4 months (around 6/7/2018) for fasting, follow up.

## 2018-02-10 LAB
ALBUMIN SERPL-MCNC: 4.1 G/DL (ref 3.5–5.5)
ALBUMIN/GLOB SERPL: 1.4 {RATIO} (ref 1.2–2.2)
ALP SERPL-CCNC: 76 IU/L (ref 39–117)
ALT SERPL-CCNC: 9 IU/L (ref 0–32)
APPEARANCE UR: CLEAR
AST SERPL-CCNC: 13 IU/L (ref 0–40)
BASOPHILS # BLD AUTO: 0 X10E3/UL (ref 0–0.2)
BASOPHILS NFR BLD AUTO: 0 %
BILIRUB SERPL-MCNC: 1 MG/DL (ref 0–1.2)
BILIRUB UR QL STRIP: NEGATIVE
BUN SERPL-MCNC: 12 MG/DL (ref 6–24)
BUN/CREAT SERPL: 17 (ref 9–23)
CALCIUM SERPL-MCNC: 9.3 MG/DL (ref 8.7–10.2)
CHLORIDE SERPL-SCNC: 104 MMOL/L (ref 96–106)
CHOLEST SERPL-MCNC: 247 MG/DL (ref 100–199)
CO2 SERPL-SCNC: 26 MMOL/L (ref 18–29)
COLOR UR: YELLOW
CREAT SERPL-MCNC: 0.72 MG/DL (ref 0.57–1)
EOSINOPHIL # BLD AUTO: 0.5 X10E3/UL (ref 0–0.4)
EOSINOPHIL NFR BLD AUTO: 10 %
ERYTHROCYTE [DISTWIDTH] IN BLOOD BY AUTOMATED COUNT: 13.4 % (ref 12.3–15.4)
EST. AVERAGE GLUCOSE BLD GHB EST-MCNC: 108 MG/DL
GFR SERPLBLD CREATININE-BSD FMLA CKD-EPI: 109 ML/MIN/1.73
GFR SERPLBLD CREATININE-BSD FMLA CKD-EPI: 95 ML/MIN/1.73
GLOBULIN SER CALC-MCNC: 2.9 G/DL (ref 1.5–4.5)
GLUCOSE SERPL-MCNC: 85 MG/DL (ref 65–99)
GLUCOSE UR QL: NEGATIVE
HBA1C MFR BLD: 5.4 % (ref 4.8–5.6)
HCT VFR BLD AUTO: 40 % (ref 34–46.6)
HCV AB S/CO SERPL IA: <0.1 S/CO RATIO (ref 0–0.9)
HDLC SERPL-MCNC: 63 MG/DL
HEMOCCULT STL QL IA: POSITIVE
HGB BLD-MCNC: 12.9 G/DL (ref 11.1–15.9)
HGB UR QL STRIP: NEGATIVE
IMM GRANULOCYTES # BLD: 0 X10E3/UL (ref 0–0.1)
IMM GRANULOCYTES NFR BLD: 0 %
INTERPRETATION, 910389: NORMAL
KETONES UR QL STRIP: NEGATIVE
LDLC SERPL CALC-MCNC: 163 MG/DL (ref 0–99)
LEUKOCYTE ESTERASE UR QL STRIP: NEGATIVE
LYMPHOCYTES # BLD AUTO: 1.6 X10E3/UL (ref 0.7–3.1)
LYMPHOCYTES NFR BLD AUTO: 35 %
MCH RBC QN AUTO: 30.1 PG (ref 26.6–33)
MCHC RBC AUTO-ENTMCNC: 32.3 G/DL (ref 31.5–35.7)
MCV RBC AUTO: 94 FL (ref 79–97)
MICRO URNS: NORMAL
MONOCYTES # BLD AUTO: 0.5 X10E3/UL (ref 0.1–0.9)
MONOCYTES NFR BLD AUTO: 10 %
NEUTROPHILS # BLD AUTO: 2.2 X10E3/UL (ref 1.4–7)
NEUTROPHILS NFR BLD AUTO: 45 %
NITRITE UR QL STRIP: NEGATIVE
PH UR STRIP: 6.5 [PH] (ref 5–7.5)
PLATELET # BLD AUTO: 245 X10E3/UL (ref 150–379)
POTASSIUM SERPL-SCNC: 4.5 MMOL/L (ref 3.5–5.2)
PROT SERPL-MCNC: 7 G/DL (ref 6–8.5)
PROT UR QL STRIP: NEGATIVE
RBC # BLD AUTO: 4.28 X10E6/UL (ref 3.77–5.28)
SODIUM SERPL-SCNC: 144 MMOL/L (ref 134–144)
SP GR UR: 1.02 (ref 1–1.03)
T4 FREE SERPL-MCNC: 1.16 NG/DL (ref 0.82–1.77)
TRIGL SERPL-MCNC: 103 MG/DL (ref 0–149)
TSH SERPL DL<=0.005 MIU/L-ACNC: 4.11 UIU/ML (ref 0.45–4.5)
UROBILINOGEN UR STRIP-MCNC: 0.2 MG/DL (ref 0.2–1)
VLDLC SERPL CALC-MCNC: 21 MG/DL (ref 5–40)
WBC # BLD AUTO: 4.7 X10E3/UL (ref 3.4–10.8)

## 2018-02-10 RX ORDER — SIMVASTATIN 20 MG/1
20 TABLET, FILM COATED ORAL
Qty: 90 TAB | Refills: 1 | Status: SHIPPED | OUTPATIENT
Start: 2018-02-10 | End: 2018-08-07 | Stop reason: SDUPTHER

## 2018-02-10 RX ORDER — LEVOTHYROXINE SODIUM 50 UG/1
50 TABLET ORAL
Qty: 90 TAB | Refills: 1 | Status: SHIPPED | OUTPATIENT
Start: 2018-02-10 | End: 2018-08-07 | Stop reason: SDUPTHER

## 2018-02-10 NOTE — PROGRESS NOTES
Please inform patient that   all results including blood count, kidney and liver function, thyroid profile,  average blood sugar ( diabetes screening), urine, are normal but cholesterol results are very high. She was off medicines for 3 months. Will send Rx with refills today. Will need lower dose of Levothyroxine and Simvastatin. To repeat blood work in 4-6 months.   thanks

## 2018-02-11 DIAGNOSIS — R19.5 OCCULT BLOOD POSITIVE STOOL: Primary | ICD-10-CM

## 2018-02-11 NOTE — PROGRESS NOTES
Please inform patient,  Stool occult positive. Would strongly recommend colonoscopy.   Will make referral and let patient know  thanks

## 2018-02-12 NOTE — PROGRESS NOTES
Outgoing call to patient.  verified. Reviewed results with patient and daughter.  Advised that patient needs colonoscopy and would be receiving call to set up

## 2018-06-07 ENCOUNTER — OFFICE VISIT (OUTPATIENT)
Dept: FAMILY MEDICINE CLINIC | Age: 53
End: 2018-06-07

## 2018-06-07 VITALS
WEIGHT: 249 LBS | HEIGHT: 72 IN | TEMPERATURE: 97.8 F | RESPIRATION RATE: 16 BRPM | BODY MASS INDEX: 33.72 KG/M2 | SYSTOLIC BLOOD PRESSURE: 118 MMHG | DIASTOLIC BLOOD PRESSURE: 70 MMHG | OXYGEN SATURATION: 98 % | HEART RATE: 74 BPM

## 2018-06-07 DIAGNOSIS — E78.5 DYSLIPIDEMIA: ICD-10-CM

## 2018-06-07 DIAGNOSIS — K21.9 GASTROESOPHAGEAL REFLUX DISEASE WITHOUT ESOPHAGITIS: ICD-10-CM

## 2018-06-07 DIAGNOSIS — E03.9 HYPOTHYROIDISM, ADULT: Primary | ICD-10-CM

## 2018-06-07 RX ORDER — CYCLOBENZAPRINE HCL 5 MG
5 TABLET ORAL
Qty: 30 TAB | Refills: 0 | Status: SHIPPED | OUTPATIENT
Start: 2018-06-07 | End: 2018-10-10 | Stop reason: ALTCHOICE

## 2018-06-07 RX ORDER — LEVOTHYROXINE SODIUM 50 UG/1
TABLET ORAL
COMMUNITY
Start: 2018-02-12 | End: 2018-06-07 | Stop reason: SDUPTHER

## 2018-06-07 RX ORDER — FAMOTIDINE 40 MG/1
40 TABLET, FILM COATED ORAL
Qty: 30 TAB | Refills: 2 | Status: SHIPPED | OUTPATIENT
Start: 2018-06-07 | End: 2018-09-04 | Stop reason: SDUPTHER

## 2018-06-07 RX ORDER — TIZANIDINE 2 MG/1
TABLET ORAL
COMMUNITY
Start: 2018-01-17 | End: 2018-06-07 | Stop reason: SDUPTHER

## 2018-06-07 RX ORDER — SIMVASTATIN 20 MG/1
20 TABLET, FILM COATED ORAL
COMMUNITY
Start: 2018-02-12 | End: 2018-06-07 | Stop reason: SDUPTHER

## 2018-06-07 RX ORDER — CYCLOBENZAPRINE HCL 5 MG
TABLET ORAL
COMMUNITY
End: 2018-06-07 | Stop reason: SDUPTHER

## 2018-06-07 NOTE — PATIENT INSTRUCTIONS

## 2018-06-07 NOTE — LETTER
6/14/2018 10:40 AM    Ms. Julia Luna Arop  [address] [address]      Dear Sonali Codsocrates:    Please find your most recent results below. Resulted Orders   METABOLIC PANEL, COMPREHENSIVE   Result Value Ref Range    Glucose 89 65 - 99 mg/dL    BUN 8 6 - 24 mg/dL    Creatinine 0.74 0.57 - 1.00 mg/dL    GFR est non-AA 91 >59 mL/min/1.73    GFR est  >59 mL/min/1.73    BUN/Creatinine ratio 11 9 - 23    Sodium 142 134 - 144 mmol/L    Potassium 4.3 3.5 - 5.2 mmol/L    Chloride 104 96 - 106 mmol/L    CO2 25 18 - 29 mmol/L      Comment:      **Effective June 11, 2018 Carbon Dioxide, Total**    reference interval will be changing to: Age                  Male          Female       0 days   - 30 days         12 - 34        16 - 34      31 days   -  1 year         15 - 25        15 - 25       2 years  -  5 years        16 - 34        14 - 32       6 years  - 12 years        23 - 32        22 - 32                 >12 years        21 - 34        20 - 29      Calcium 9.1 8.7 - 10.2 mg/dL    Protein, total 6.9 6.0 - 8.5 g/dL    Albumin 4.0 3.5 - 5.5 g/dL    GLOBULIN, TOTAL 2.9 1.5 - 4.5 g/dL    A-G Ratio 1.4 1.2 - 2.2    Bilirubin, total 0.9 0.0 - 1.2 mg/dL    Alk.  phosphatase 80 39 - 117 IU/L    AST (SGOT) 16 0 - 40 IU/L    ALT (SGPT) 11 0 - 32 IU/L    Narrative    Performed at:  [address]  [id number]  : Brandon Bustillo MD, Phone:  [phone]   LIPID PANEL   Result Value Ref Range    Cholesterol, total 180 100 - 199 mg/dL    Triglyceride 57 0 - 149 mg/dL    HDL Cholesterol 64 >39 mg/dL    VLDL, calculated 11 5 - 40 mg/dL    LDL, calculated 105 (H) 0 - 99 mg/dL    Narrative    Performed at:  [address]  [id number]  : Brandon Bustillo MD, Phone:  [phone]   TSH AND FREE T4   Result Value Ref Range    TSH 1.800 0.450 - 4.500 uIU/mL    T4, Free 1.33 0.82 - 1.77 ng/dL Narrative    Performed at:  [address]  [id number]  : Veronica Broderick MD, Phone:  [phone]   CVD REPORT   Result Value Ref Range    INTERPRETATION Note       Comment:      Supplemental report is available. Narrative    Performed at:  [address] [place] [address]  [id number]  : Ria Teran MD, Phone:  [phone]       RECOMMENDATIONS:  Significant improvement in cholesterol results since she is back to Simvastatin.  Results very normal   Kidney, liver, thyroid results very normal   No change in medicines now  Please call me if you have any questions: [phone]        Sincerely,      Julio Carlin MD

## 2018-06-07 NOTE — PROGRESS NOTES
Chief Complaint   Patient presents with    Cholesterol Problem     Follow up, Fasting     Thyroid Problem    Anemia    Headache     with vomiting , abdominal discomfort    Joint Pain     back and knee pain     1. Have you been to the ER, urgent care clinic since your last visit? Hospitalized since your last visit? No    2. Have you seen or consulted any other health care providers outside of the [address] [place] [address] since your last visit? Include any pap smears or colon screening.  No

## 2018-06-07 NOTE — PROGRESS NOTES
HISTORY OF PRESENT ILLNESS  Mark Grove is a 52 y.o. female. she was seen for 4 months follow up on dyslipidemia. Hypothyroid, and GERD  Still has occasional back pain and knee pain. HPI  Cardiovascular Review  The patient has hyperlipidemia. She reports taking medications as instructed, no medication side effects noted, no TIA's, no chest pain on exertion, no dyspnea on exertion, noting swelling of ankles, no orthostatic dizziness or lightheadedness, no orthopnea or paroxysmal nocturnal dyspnea, no palpitations, no intermittent claudication symptoms. Diet and Lifestyle: generally follows a low fat low cholesterol diet, generally follows a low sodium diet, exercises sporadically, nonsmoker. Lab review: labs reviewed and discussed with patient. Medicines: resumed back on Simvastatin 20 mg on last visit  Lab Results   Component Value Date/Time    Cholesterol, total 247 (H) 02/09/2018 09:18 AM    HDL Cholesterol 63 02/09/2018 09:18 AM    LDL, calculated 163 (H) 02/09/2018 09:18 AM    VLDL, calculated 21 02/09/2018 09:18 AM    Triglyceride 103 02/09/2018 09:18 AM         Endocrine Review  Patient is seen for followup of hypothyroidism. Since last visit: resumed back on Levothyroxine 50 mcg  She reports medication compliance: all the time and is taking separate from all her other meds. She reports the following concerns/problems/med side effects: none. Lab review: labs reviewed and discussed with patient. Lab Results   Component Value Date/Time    TSH 4.110 02/09/2018 09:18 AM    T4, Free 1.16 02/09/2018 09:18 AM         GERD  Patient complains of gastroesophageal reflux with belching and eructation, bilious reflux and heartburn. Symptoms have been present for a few years. She denies dysphagia. She  has not lost weight. She denies melena, hematochezia, hematemesis, and coffee ground emesis.   This has been associated with heartburn, nausea and symptoms primarily relate to meals, and lying down after meals.  She denies abdominal bloating, cough, hematemesis, melena, midepigastric pain, regurgitation of undigested food, unexpected weight loss and upper abdominal discomfort. Medical therapy in the past has included proton pump inhibitors. She had bilious vomiting with severe heart burn 2 days back  Review of Systems   Constitutional: Negative for chills, fever and malaise/fatigue. HENT: Negative for congestion, ear pain, sore throat and tinnitus. Eyes: Negative for blurred vision, double vision, pain and discharge. Respiratory: Negative for cough, shortness of breath and wheezing. Cardiovascular: Negative for chest pain, palpitations and leg swelling. Gastrointestinal: Positive for heartburn and nausea. Negative for abdominal pain, blood in stool, constipation, diarrhea and vomiting. Genitourinary: Negative for dysuria, frequency, hematuria and urgency. Musculoskeletal: Positive for back pain. Negative for joint pain and myalgias. Knee pain   Skin: Negative for rash. Neurological: Negative for dizziness, tremors, seizures and headaches. Endo/Heme/Allergies: Negative for polydipsia. Does not bruise/bleed easily. Psychiatric/Behavioral: Negative for depression and substance abuse. The patient is not nervous/anxious. Physical Exam   Constitutional: She is oriented to person, place, and time. She appears well-developed and well-nourished. HENT:   Head: Normocephalic and atraumatic. Right Ear: External ear normal.   Mouth/Throat: Oropharynx is clear and moist. No oropharyngeal exudate. Eyes: Conjunctivae and EOM are normal. Pupils are equal, round, and reactive to light. No scleral icterus. Neck: Normal range of motion. Neck supple. No JVD present. No thyromegaly present. Cardiovascular: Normal rate, regular rhythm, normal heart sounds and intact distal pulses. No murmur heard.   Ankle swelling +1   Pulmonary/Chest: Effort normal and breath sounds normal. She has no wheezes. Abdominal: Soft. Bowel sounds are normal. She exhibits no distension and no mass. Musculoskeletal: Normal range of motion. She exhibits no edema or tenderness. Lymphadenopathy:     She has no cervical adenopathy. Neurological: She is alert and oriented to person, place, and time. She has normal reflexes. No cranial nerve deficit. Skin: Skin is warm and dry. No rash noted. She is not diaphoretic. Psychiatric: She has a normal mood and affect. Nursing note and vitals reviewed. ASSESSMENT and PLAN  Diagnoses and all orders for this visit:    1. Hypothyroidism, adult  -     TSH AND FREE T4    2. Dyslipidemia  -     METABOLIC PANEL, COMPREHENSIVE  -     LIPID PANEL    3. Gastroesophageal reflux disease without esophagitis  -     famotidine (PEPCID) 40 mg tablet; Take 1 Tab by mouth nightly. Other orders  -     cyclobenzaprine (FLEXERIL) 5 mg tablet; Take 1 Tab by mouth nightly. Discussed lifestyle issues and health guidance given  Patient was given an after visit summary which includes diagnoses, vital signs, current medications, instructions and references & authorized prescriptions . Results of labs will be conveyed to patient, once available. Pt verbalized instructions I provided and expressed understanding of discussion that was held today. Follow-up Disposition:  Return in about 4 months (around 10/7/2018) for fasting, follow up.

## 2018-06-07 NOTE — MR AVS SNAPSHOT
[facility]       [address]  [phone]     Patient: Phoenix Higgins  MRN: [id number]  :1965               Visit Information     Date & Time Provider Department Dept. Phone Encounter #    2018 10:40 AM Gautam Avalos  [facility] [phone] [id number]      Follow-up Instructions     Return in about 4 months (around 10/7/2018) for fasting, follow up. Upcoming Health Maintenance        Date Due    DTaP/Tdap/Td series (1 - Tdap) 1984    Influenza Age 5 to Adult 2018    FOBT Q 1 YEAR AGE 50-75 2019    BREAST CANCER SCRN MAMMOGRAM 2019    PAP AKA CERVICAL CYTOLOGY 2020      Allergies as of 2018  Review Complete On: 2018 By: Gautam Avalos MD    No Known Allergies      Current Immunizations  Reviewed on 10/31/2012    Name Date    Influenza Vaccine Split 10/31/2012       Not reviewed this visit   You Were Diagnosed With        Codes Comments    Hypothyroidism, adult    -  Primary ICD-10-CM: E03.9  ICD-9-CM: 200. 9     Dyslipidemia     ICD-10-CM: E78.5  ICD-9-CM: 272.4     Vitamin D deficiency     ICD-10-CM: E55.9  ICD-9-CM: 268.9     Gastroesophageal reflux disease without esophagitis     ICD-10-CM: K21.9  ICD-9-CM: 530.81       Vitals     BP Pulse Temp Resp Height(growth percentile) Weight(growth percentile)    118/70 (BP 1 Location: Left arm, BP Patient Position: Sitting) 74 97.8 °F (36.6 °C) (Oral) 16 6' 4\" (1.93 m) 249 lb (112.9 kg)    SpO2 BMI OB Status Smoking Status          98% 30.31 kg/m2 Postmenopausal Never Smoker      Vitals History      BMI and BSA Data     Body Mass Index Body Surface Area    30.31 kg/m 2 2.46 m 2         Preferred Pharmacy       Pharmacy Name Phone    [address] [phone]         Your Updated Medication List          This list is accurate as of 18 11:42 AM.  Always use your most recent med list. cyclobenzaprine 5 mg tablet   Commonly known as:  FLEXERIL   Take 1 Tab by mouth nightly. famotidine 40 mg tablet   Commonly known as:  PEPCID   Take 1 Tab by mouth nightly. levothyroxine 50 mcg tablet   Commonly known as:  SYNTHROID   Take 1 Tab by mouth Daily (before breakfast). simvastatin 20 mg tablet   Commonly known as:  ZOCOR   Take 1 Tab by mouth nightly. VITAMIN D3 2,000 unit Tab   Generic drug:  cholecalciferol (vitamin D3)   Take  by mouth. Prescriptions Sent to Pharmacy        Refills    famotidine (PEPCID) 40 mg tablet 2    Sig: Take 1 Tab by mouth nightly. Class: Normal    Pharmacy: [facility] [address] [facility] Ph #: [phone]    Route: Oral    cyclobenzaprine (FLEXERIL) 5 mg tablet 0    Sig: Take 1 Tab by mouth nightly. Class: Normal    Pharmacy: [facility] [address] [facility] [address] [place] [address] Ph #: [phone]    Route: Oral      We Performed the Following     LIPID PANEL [45152 CPT(R)]     METABOLIC PANEL, COMPREHENSIVE [75359 CPT(R)]     TSH AND FREE T4 [47535 CPT(R)]       Follow-up Instructions     Return in about 4 months (around 10/7/2018) for fasting, follow up. Patient Instructions         Low Sodium Diet (2,000 Milligram): Care Instructions  Your Care Instructions    Too much sodium causes your body to hold on to extra water. This can raise your blood pressure and force your heart and kidneys to work harder. In very serious cases, this could cause you to be put in the hospital. It might even be life-threatening. By limiting sodium, you will feel better and lower your risk of serious problems. The most common source of sodium is salt. People get most of the salt in their diet from canned, prepared, and packaged foods. Fast food and restaurant meals also are very high in sodium. Your doctor will probably limit your sodium to less than 2,000 milligrams (mg) a day.  This limit counts all the sodium in prepared and packaged foods and any salt you add to your food. Follow-up care is a key part of your treatment and safety. Be sure to make and go to all appointments, and call your doctor if you are having problems. It's also a good idea to know your test results and keep a list of the medicines you take. How can you care for yourself at home? Read food labels  · Read labels on cans and food packages. The labels tell you how much sodium is in each serving. Make sure that you look at the serving size. If you eat more than the serving size, you have eaten more sodium. · Food labels also tell you the Percent Daily Value for sodium. Choose products with low Percent Daily Values for sodium. · Be aware that sodium can come in forms other than salt, including monosodium glutamate (MSG), sodium citrate, and sodium bicarbonate (baking soda). MSG is often added to Asian food. When you eat out, you can sometimes ask for food without MSG or added salt. Buy low-sodium foods  · Buy foods that are labeled \"unsalted\" (no salt added), \"sodium-free\" (less than 5 mg of sodium per serving), or \"low-sodium\" (less than 140 mg of sodium per serving). Foods labeled \"reduced-sodium\" and \"light sodium\" may still have too much sodium. Be sure to read the label to see how much sodium you are getting. · Buy fresh vegetables, or frozen vegetables without added sauces. Buy low-sodium versions of canned vegetables, soups, and other canned goods. Prepare low-sodium meals  · Cut back on the amount of salt you use in cooking. This will help you adjust to the taste. Do not add salt after cooking. One teaspoon of salt has about 2,300 mg of sodium. · Take the salt shaker off the table. · Flavor your food with garlic, lemon juice, onion, vinegar, herbs, and spices. Do not use soy sauce, lite soy sauce, steak sauce, onion salt, garlic salt, celery salt, mustard, or ketchup on your food. · Use low-sodium salad dressings, sauces, and ketchup.  Or make your own salad dressings and sauces without adding salt. · Use less salt (or none) when recipes call for it. You can often use half the salt a recipe calls for without losing flavor. Other foods such as rice, pasta, and grains do not need added salt. · Rinse canned vegetables, and cook them in fresh water. This removes some-but not all-of the salt. · Avoid water that is naturally high in sodium or that has been treated with water softeners, which add sodium. Call your local water company to find out the sodium content of your water supply. If you buy bottled water, read the label and choose a sodium-free brand. Avoid high-sodium foods  · Avoid eating:  ¨ Smoked, cured, salted, and canned meat, fish, and poultry. ¨ Ham, owen, hot dogs, and luncheon meats. ¨ Regular, hard, and processed cheese and regular peanut butter. ¨ Crackers with salted tops, and other salted snack foods such as pretzels, chips, and salted popcorn. ¨ Frozen prepared meals, unless labeled low-sodium. ¨ Canned and dried soups, broths, and bouillon, unless labeled sodium-free or low-sodium. ¨ Canned vegetables, unless labeled sodium-free or low-sodium. ¨ Western Wendy fries, pizza, tacos, and other fast foods. ¨ Pickles, olives, ketchup, and other condiments, especially soy sauce, unless labeled sodium-free or low-sodium. Where can you learn more? Go to http://tc-soniya.info/. Enter X902 in the search box to learn more about \"Low Sodium Diet (2,000 Milligram): Care Instructions. \"  Current as of: May 12, 2017  Content Version: 11.4  © 2135-3116 [employer]. Care instructions adapted under license by [employer] (which disclaims liability or warranty for this information). If you have questions about a medical condition or this instruction, always ask your healthcare professional. [address] any warranty or liability for your use of this information.            Introducing MYCHART! [employer] introduces [employer] patient portal. Now you can access parts of your medical record, email your doctor's office, and request medication refills online. 1. In your internet browser, go to [email or web address]   2. Click on the First Time User? Click Here link in the Sign In box. You will see the New Member Sign Up page. 3. Enter your [employer] Access Code exactly as it appears below. You will not need to use this code after youve completed the sign-up process. If you do not sign up before the expiration date, you must request a new code. · [employer] Access Code: [email or web address]  Expires: 9/5/2018 11:42 AM    4. Enter the last four digits of your Social Security Number (xxxx) and Date of Birth (mm/dd/yyyy) as indicated and click Submit. You will be taken to the next sign-up page. 5. Create a [employer] ID. This will be your [employer] login ID and cannot be changed, so think of one that is secure and easy to remember. 6. Create a [employer] password. You can change your password at any time. 7. Enter your Password Reset Question and Answer. This can be used at a later time if you forget your password. 8. Enter your e-mail address. You will receive e-mail notification when new information is available in [address] 9. Click Sign Up. You can now view and download portions of your medical record. 10. Click the Download Summary menu link to download a portable copy of your medical information. If you have questions, please visit the Frequently Asked Questions section of the [employer] website. Remember, [employer] is NOT to be used for urgent needs. For medical emergencies, dial 911. Now available from your iPhone and Android! Please provide this summary of care documentation to your next provider. Your primary care clinician is listed as Eulene Dials. If you have any questions after today's visit, please call [phone].

## 2018-06-08 LAB
ALBUMIN SERPL-MCNC: 4 G/DL (ref 3.5–5.5)
ALBUMIN/GLOB SERPL: 1.4 {RATIO} (ref 1.2–2.2)
ALP SERPL-CCNC: 80 IU/L (ref 39–117)
ALT SERPL-CCNC: 11 IU/L (ref 0–32)
AST SERPL-CCNC: 16 IU/L (ref 0–40)
BILIRUB SERPL-MCNC: 0.9 MG/DL (ref 0–1.2)
BUN SERPL-MCNC: 8 MG/DL (ref 6–24)
BUN/CREAT SERPL: 11 (ref 9–23)
CALCIUM SERPL-MCNC: 9.1 MG/DL (ref 8.7–10.2)
CHLORIDE SERPL-SCNC: 104 MMOL/L (ref 96–106)
CHOLEST SERPL-MCNC: 180 MG/DL (ref 100–199)
CO2 SERPL-SCNC: 25 MMOL/L (ref 18–29)
CREAT SERPL-MCNC: 0.74 MG/DL (ref 0.57–1)
GFR SERPLBLD CREATININE-BSD FMLA CKD-EPI: 105 ML/MIN/1.73
GFR SERPLBLD CREATININE-BSD FMLA CKD-EPI: 91 ML/MIN/1.73
GLOBULIN SER CALC-MCNC: 2.9 G/DL (ref 1.5–4.5)
GLUCOSE SERPL-MCNC: 89 MG/DL (ref 65–99)
HDLC SERPL-MCNC: 64 MG/DL
INTERPRETATION, 910389: NORMAL
LDLC SERPL CALC-MCNC: 105 MG/DL (ref 0–99)
POTASSIUM SERPL-SCNC: 4.3 MMOL/L (ref 3.5–5.2)
PROT SERPL-MCNC: 6.9 G/DL (ref 6–8.5)
SODIUM SERPL-SCNC: 142 MMOL/L (ref 134–144)
T4 FREE SERPL-MCNC: 1.33 NG/DL (ref 0.82–1.77)
TRIGL SERPL-MCNC: 57 MG/DL (ref 0–149)
TSH SERPL DL<=0.005 MIU/L-ACNC: 1.8 UIU/ML (ref 0.45–4.5)
VLDLC SERPL CALC-MCNC: 11 MG/DL (ref 5–40)

## 2018-06-08 NOTE — PROGRESS NOTES
Please inform patient  Significant improvement in cholesterol results since she is back to Simvastatin.  Results very normal  Kidney, liver, thyroid results very normal  No change in medicines now  thanks

## 2018-06-08 NOTE — PROGRESS NOTES
Outbound call to patient. No answer left message on patients voicemail to return call regarding recent labs.

## 2018-06-14 NOTE — PROGRESS NOTES
Outbound call to patient. Second attempt no answer left message for patient to return call regarding labs. Letter printed with results.

## 2018-08-08 RX ORDER — LEVOTHYROXINE SODIUM 50 UG/1
TABLET ORAL
Qty: 90 TAB | Refills: 1 | Status: SHIPPED | OUTPATIENT
Start: 2018-08-08 | End: 2018-08-08 | Stop reason: SDUPTHER

## 2018-08-08 RX ORDER — SIMVASTATIN 20 MG/1
TABLET, FILM COATED ORAL
Qty: 90 TAB | Refills: 1 | Status: SHIPPED | OUTPATIENT
Start: 2018-08-08 | End: 2019-07-10

## 2018-08-08 RX ORDER — LEVOTHYROXINE SODIUM 50 UG/1
TABLET ORAL
Qty: 90 TAB | Refills: 1 | Status: SHIPPED | OUTPATIENT
Start: 2018-08-08 | End: 2019-07-10

## 2018-08-08 RX ORDER — SIMVASTATIN 20 MG/1
TABLET, FILM COATED ORAL
Qty: 90 TAB | Refills: 1 | Status: SHIPPED | OUTPATIENT
Start: 2018-08-08 | End: 2018-08-08 | Stop reason: SDUPTHER

## 2018-08-08 NOTE — TELEPHONE ENCOUNTER
Refill. Pharm on file.     Requested Prescriptions     Pending Prescriptions Disp Refills    simvastatin (ZOCOR) 20 mg tablet 90 Tab 1    levothyroxine (SYNTHROID) 50 mcg tablet 90 Tab 1

## 2018-09-04 DIAGNOSIS — K21.9 GASTROESOPHAGEAL REFLUX DISEASE WITHOUT ESOPHAGITIS: ICD-10-CM

## 2018-09-04 RX ORDER — FAMOTIDINE 40 MG/1
TABLET, FILM COATED ORAL
Qty: 30 TAB | Refills: 2 | Status: SHIPPED | OUTPATIENT
Start: 2018-09-04 | End: 2018-10-10 | Stop reason: ALTCHOICE

## 2018-10-10 ENCOUNTER — OFFICE VISIT (OUTPATIENT)
Dept: FAMILY MEDICINE CLINIC | Age: 53
End: 2018-10-10

## 2018-10-10 VITALS
RESPIRATION RATE: 16 BRPM | BODY MASS INDEX: 33.59 KG/M2 | HEART RATE: 68 BPM | OXYGEN SATURATION: 99 % | HEIGHT: 72 IN | DIASTOLIC BLOOD PRESSURE: 74 MMHG | SYSTOLIC BLOOD PRESSURE: 120 MMHG | WEIGHT: 248 LBS | TEMPERATURE: 97.9 F

## 2018-10-10 DIAGNOSIS — M22.2X1 PATELLOFEMORAL SYNDROME OF BOTH KNEES: ICD-10-CM

## 2018-10-10 DIAGNOSIS — E03.9 HYPOTHYROIDISM, ADULT: Primary | ICD-10-CM

## 2018-10-10 DIAGNOSIS — Z23 ENCOUNTER FOR IMMUNIZATION: ICD-10-CM

## 2018-10-10 DIAGNOSIS — Z71.84 TRAVEL ADVICE ENCOUNTER: ICD-10-CM

## 2018-10-10 DIAGNOSIS — E78.5 DYSLIPIDEMIA: ICD-10-CM

## 2018-10-10 DIAGNOSIS — M22.2X2 PATELLOFEMORAL SYNDROME OF BOTH KNEES: ICD-10-CM

## 2018-10-10 RX ORDER — MEFLOQUINE HYDROCHLORIDE 250 MG/1
250 TABLET ORAL
Qty: 7 TAB | Refills: 0 | Status: SHIPPED | OUTPATIENT
Start: 2018-10-10 | End: 2018-11-22

## 2018-10-10 NOTE — PROGRESS NOTES
Chief Complaint   Patient presents with    Cholesterol Problem     Follow up, Fasting     Thyroid Problem    Ear Pain     bilateral     Knee Pain    Immunization/Injection     Influenza      1. Have you been to the ER, urgent care clinic since your last visit? Hospitalized since your last visit? No    2. Have you seen or consulted any other health care providers outside of the [address] [place] [address] since your last visit? Include any pap smears or colon screening. Idania Majano  is a 52 y.o.  female  who present for Influenza  immunizations/injections. He/she denies any symptoms , reactions or allergies that would exclude them from being immunized today. Risks and adverse reactions were discussed and the VIS was given if applicable to them. All questions were addressed. He/She was observed for 10 min post injection. There were no reactions observed.     Slava Asif LPN

## 2018-10-10 NOTE — MR AVS SNAPSHOT
[address]  [phone]     Patient: Jeff Monahan  MRN: [id number]  :1965               Visit Information     Date & Time Provider Department Dept. Phone Encounter #    10/10/2018  9:20 AM Javier Marmolejo MD [address] [place] [address] [phone] [id number]      Upcoming Health Maintenance        Date Due    DTaP/Tdap/Td series (1 - Tdap) 1984    Shingrix Vaccine Age 50> (1 of 2) 2013    Influenza Age 9 to Adult 2018    FOBT Q 1 YEAR AGE 50-75 2019    BREAST CANCER SCRN MAMMOGRAM 2019    PAP AKA CERVICAL CYTOLOGY 2020      Allergies as of 10/10/2018  Review Complete On: 10/10/2018 By: Javier Marmolejo MD    No Known Allergies      Current Immunizations  Reviewed on 10/31/2012    Name Date    Influenza Vaccine Elio Camejo) PF  Incomplete    Influenza Vaccine Split 10/31/2012       Not reviewed this visit   You Were Diagnosed With        Codes Comments    Hypothyroidism, adult    -  Primary ICD-10-CM: E03.9  ICD-9-CM: 200. 9     Dyslipidemia     ICD-10-CM: E78.5  ICD-9-CM: 272.4     Encounter for immunization     ICD-10-CM: Z23  ICD-9-CM: V03.89     Travel advice encounter     ICD-10-CM: Z71.89  ICD-9-CM: V65.49     Patellofemoral syndrome of both knees     ICD-10-CM: M22.2X1, M22.2X2  ICD-9-CM: 719.46       Vitals     BP Pulse Temp Resp Height(growth percentile) Weight(growth percentile)    120/74 (BP 1 Location: Right arm, BP Patient Position: Sitting) 68 97.9 °F (36.6 °C) (Oral) 16 6' 4\" (1.93 m) 248 lb (112.5 kg)    SpO2 BMI OB Status Smoking Status          99% 30.19 kg/m2 Postmenopausal Never Smoker      Vitals History      BMI and BSA Data     Body Mass Index Body Surface Area    30.19 kg/m 2 2.46 m 2         Preferred Pharmacy       Pharmacy Name Phone    [address] [phone]         Your Updated Medication List          This list is accurate as of 10/10/18 10:11 AM.  Always use your most recent med list.                levothyroxine 50 mcg tablet   Commonly known as:  SYNTHROID   TAKE 1 TABLET BY MOUTH BEFORE BREAKFAST       mefloquine 250 mg tablet   Commonly known as:  LARIAM   Take 250 mg by mouth every seven (7) days for 7 doses. Take with food and at least 8 ounces of water       simvastatin 20 mg tablet   Commonly known as:  ZOCOR   TAKE ONE TABLET BY MOUTH NIGHTLY       VITAMIN D3 2,000 unit Tab   Generic drug:  cholecalciferol (vitamin D3)   Take  by mouth. Prescriptions Sent to Pharmacy        Refills    mefloquine (LARIAM) 250 mg tablet 0    Sig: Take 250 mg by mouth every seven (7) days for 7 doses. Take with food and at least 8 ounces of water    Class: Normal    Pharmacy: [facility] [address] AMMY HOLLEY [address] [place] [address] #: [phone]    Route: Oral      We Performed the Following     INFLUENZA VIRUS VAC QUAD,SPLIT,PRESV FREE SYRINGE IM [41696 CPT(R)]     LIPID PANEL [43217 CPT(R)]     METABOLIC PANEL, COMPREHENSIVE [56772 CPT(R)]     IA IMMUNIZ ADMIN,1 SINGLE/COMB VAC/TOXOID [12597 CPT(R)]     TSH REFLEX TO T4 [48430 CPT(R)]       Patient Instructions         Learning About Healthy Travel Abroad  How can you stay healthy on your trip? The best way to stay healthy on your trip is to plan ahead. Talk with your doctor several months before you travel to another country. It's important to allow enough time to get the vaccine doses that you need. For example, if you need the hepatitis A vaccine, you'll need 2 doses spaced at least 6 months apart. Also ask your doctor if there are medicines or extra safety steps that you should take. Check with your local health department or travel health clinic for other travel tips. What can you do to prevent health problems? Get needed vaccines  · Make sure you are up to date with your routine shots. They can protect you from diseases such as polio, diphtheria, and measles.  These diseases are still a problem in some developing countries. · Get other vaccines you need. Your doctor or a health clinic can tell you which ones you need for your travels. Here are some examples:  ¨ Hepatitis A vaccine, if you travel to developing countries. ¨ Yellow fever vaccine, if you visit places in [place] and [place] where the disease is active. ¨ Typhoid fever vaccine, if you travel to [facility] and [place], [place], or some areas of [place]. Bring medicines with you  · If you take medicines, bring a supply that will last the length of your trip. Get a letter from your doctor that lists your medical conditions and the medicines you take. Bring prescriptions for refills if you will be gone for a long time. Also bring any medical supplies you may need such as blood sugar testing supplies or insulin needles. · If you are going to an area where malaria is a risk, ask your doctor or health clinic for a prescription to help prevent infection. This medicine works best if you take it before, during, and after your trip. · You may want to bring medicine for traveler's diarrhea. Over-the-counter medicines include:  ¨ Bismuth subsalicylate (Pepto-Bismol). ¨ Loperamide (Imodium). Your doctor may also prescribe an antibiotic to take with you. This can treat diarrhea if you're going to an area where modern medical care isn't readily available. Make safer choices as you travel  · Practice safer sex. Using condoms can prevent sexually transmitted infections. · In malaria-infected areas, use DEET insect repellent. Wear long pants and long-sleeved shirts, especially from dusk to jacob. Use mosquito netting to protect yourself from bites while you sleep. · Many developing countries don't have safe tap water. Only have drinks made with boiled water, such as tea and coffee. Canned or bottled carbonated drinks, such as soda, beer, wine, or water, are usually safe. Don't use ice if you don't know what kind of water was used to make it. And don't use tap water to brush your teeth. · Be aware that you could be injured in cars, boats, or public transportation. Driving can be dangerous due to bad roads, poor  training, and crowded roadways. If you hire a  or taxi, ask the  to slow down or drive more carefully if you feel unsafe. · Air pollution in some large cities can be a problem if you have asthma or other breathing problems. Avoid those cities when air quality is poor. Or stay indoors as much as possible. · Be careful around dogs and other animals. Dogs in developing countries are often not tame and may bite. Rabies is more common in tropical and subtropical regions. · If you're going to a place that's much higher above sea level than you're used to, ask your doctor how to avoid altitude sickness. He or she may also prescribe medicine to help treat it. Where can you get the best information? · Use the Internet to find travel health information. Try these websites:  ¨ www.cdc.gov/travel. This website is for the Centers for Disease Control and Prevention (CDC). ¨ www.who.int/ith/en. This website lists information from the [address]) on travel, required immunizations, and disease outbreaks. · Find out where you can get the best medical care in the region you are visiting. See the [address] [place] [address] Department's website at www.use[employer]C [employer] Materialsy.gov. It lists every [place] embassy worldwide. It also lists some doctors and medical facilities in those countries. · Take along the phone numbers and addresses of embassies in the areas you will visit. They can help you find a doctor or hospital. Find out if your insurance company will cover you. You may want to get special travel health insurance. · If you are taking a cruise, you can find your ship's health record on this website: www.cdc.gov/nceh/vsp. Where can you learn more? Go to http://tc-soniya.info/.   Enter R129 in the search box to learn more about \"Learning About Dajie. \"  Current as of: March 29, 2018  Content Version: 11.8  © 6501-8139 [employer]. Care instructions adapted under license by [employer] (which disclaims liability or warranty for this information). If you have questions about a medical condition or this instruction, always ask your healthcare professional. [address] any warranty or liability for your use of this information. Patellofemoral Pain Syndrome (Runner's Knee): Exercises  Your Care Instructions  Here are some examples of typical rehabilitation exercises for your condition. Start each exercise slowly. Ease off the exercise if you start to have pain. Your doctor or physical therapist will tell you when you can start these exercises and which ones will work best for you. How to do the exercises  Calf wall stretch    1. Stand facing a wall with your hands on the wall at about eye level. Put your affected leg about a step behind your other leg. 2. Keeping your back leg straight and your back heel on the floor, bend your front knee and gently bring your hip and chest toward the wall until you feel a stretch in the calf of your back leg. 3. Hold the stretch for at least 15 to 30 seconds. 4. Repeat 2 to 4 times. 5. Repeat steps 1 through 4, but this time keep your back knee bent. Quadriceps stretch    1. If you are not steady on your feet, hold on to a chair, counter, or wall. 2. Bend your affected leg, and reach behind you to grab the front of your foot or ankle with the hand on the same side. For example, if you are stretching your right leg, use your right hand. 3. Keeping your knees next to each other, pull your foot toward your buttock until you feel a gentle stretch across the front of your hip and down the front of your thigh. Your knee should be pointed directly to the ground, and not out to the side.   4. Hold the stretch for at least 15 to 30 seconds. 5. Repeat 2 to 4 times. Hamstring wall stretch    1. Lie on your back in a doorway, with your good leg through the open door. 2. Slide your affected leg up the wall to straighten your knee. You should feel a gentle stretch down the back of your leg. 1. Do not arch your back. 2. Do not bend either knee. 3. Keep one heel touching the floor and the other heel touching the wall. Do not point your toes. 3. Hold the stretch for at least 1 minute. Then over time, try to lengthen the time you hold the stretch to as long as 6 minutes. 4. Repeat 2 to 4 times. 5. If you do not have a place to do this exercise in a doorway, there is another way to do it:  6. Lie on your back, and bend your affected leg. 7. Loop a towel under the ball and toes of that foot, and hold the ends of the towel in your hands. 8. Straighten your knee, and slowly pull back on the towel. You should feel a gentle stretch down the back of your leg. 9. Hold the stretch for at least 15 to 30 seconds. Or even better, hold the stretch for 1 minute if you can. 10. Repeat 2 to 4 times. Quad sets    1. Sit with your affected leg straight and supported on the floor or a firm bed. Place a small, rolled-up towel under your affected knee. Your other leg should be bent, with that foot flat on the floor. 2. Tighten the thigh muscles of your affected leg by pressing the back of your knee down into the towel. 3. Hold for about 6 seconds, then rest for up to 10 seconds. 4. Repeat 8 to 12 times. Straight-leg raises to the front    1. Lie on your back with your good knee bent so that your foot rests flat on the floor. Your affected leg should be straight. Make sure that your low back has a normal curve. You should be able to slip your hand in between the floor and the small of your back, with your palm touching the floor and your back touching the back of your hand.   2. Tighten the thigh muscles in your affected leg by pressing the back of your knee flat down to the floor. Hold your knee straight. 3. Keeping the thigh muscles tight and your leg straight, lift your affected leg up so that your heel is about 12 inches off the floor. 4. Hold for about 6 seconds, then lower your leg slowly. Rest for up to 10 seconds between repetitions. 5. Repeat 8 to 12 times. Straight-leg raises to the back    1. Lie on your stomach, and lift your leg straight up behind you (toward the ceiling). 2. Lift your toes about 6 inches off the floor, hold for about 6 seconds, then lower slowly. 3. Do 8 to 12 repetitions. Wall slide with ball squeeze    1. Stand with your back against a wall and with your feet about shoulder-width apart. Your feet should be about 12 inches away from the wall. 2. Put a ball about the size of a soccer ball between your knees. Then slowly slide down the wall until your knees are bent about 20 to 30 degrees. 3. Tighten your thigh muscles by squeezing the ball between your knees. Hold that position for about 10 seconds, then stop squeezing. Rest for up to 10 seconds between repetitions. 4. Repeat 8 to 12 times. Follow-up care is a key part of your treatment and safety. Be sure to make and go to all appointments, and call your doctor if you are having problems. It's also a good idea to know your test results and keep a list of the medicines you take. Where can you learn more? Go to http://tc-soniya.info/. Enter A404 in the search box to learn more about \"Patellofemoral Pain Syndrome (Runner's Knee): Exercises. \"  Current as of: November 29, 2017  Content Version: 11.8  © 6076-2138 [employer]. Care instructions adapted under license by [employer] (which disclaims liability or warranty for this information).  If you have questions about a medical condition or this instruction, always ask your healthcare professional. [address] any warranty or liability for your use of this information. Introducing [facility]! [employer] introduces PodPoster patient portal. Now you can access parts of your medical record, email your doctor's office, and request medication refills online. 1. In your internet browser, go to [email or web address]   2. Click on the First Time User? Click Here link in the Sign In box. You will see the New Member Sign Up page. 3. Enter your PodPoster Access Code exactly as it appears below. You will not need to use this code after youve completed the sign-up process. If you do not sign up before the expiration date, you must request a new code. · PodPoster Access Code: [email or web address]  Expires: 1/8/2019 10:11 AM    4. Enter the last four digits of your Social Security Number (xxxx) and Date of Birth (mm/dd/yyyy) as indicated and click Submit. You will be taken to the next sign-up page. 5. Create a PodPoster ID. This will be your PodPoster login ID and cannot be changed, so think of one that is secure and easy to remember. 6. Create a PodPoster password. You can change your password at any time. 7. Enter your Password Reset Question and Answer. This can be used at a later time if you forget your password. 8. Enter your e-mail address. You will receive e-mail notification when new information is available in [address] 9. Click Sign Up. You can now view and download portions of your medical record. 10. Click the Download Summary menu link to download a portable copy of your medical information. If you have questions, please visit the Frequently Asked Questions section of the PodPoster website. Remember, PodPoster is NOT to be used for urgent needs. For medical emergencies, dial 911. Now available from your iPhone and Android! Please provide this summary of care documentation to your next provider. Your primary care clinician is listed as Maxim William.  If you have any questions after today's visit, please call [phone].

## 2018-10-10 NOTE — PROGRESS NOTES
HISTORY OF PRESENT ILLNESS  Chad Reese is a 52 y.o. female. she was seen for follow up on hyperlipidemia and hypothyroid. Also concerned about bilateral knee pain. Planning to go to [place], end of December and will stay there for 5 weeks. HPI  Cardiovascular Review  The patient has hyperlipidemia. She reports taking medications as instructed, no medication side effects noted, no TIA's, no chest pain on exertion, no dyspnea on exertion, noting swelling of ankles, no orthostatic dizziness or lightheadedness, no orthopnea or paroxysmal nocturnal dyspnea, no palpitations, no intermittent claudication symptoms. Diet and Lifestyle: generally follows a low fat low cholesterol diet, generally follows a low sodium diet, exercises sporadically, nonsmoker. Lab review: labs reviewed and discussed with patient. Medicines: resumed back on Simvastatin 20 mg        Lab Results   Component Value Date/Time    Cholesterol, total 180 06/07/2018 11:46 AM    HDL Cholesterol 64 06/07/2018 11:46 AM    LDL, calculated 105 (H) 06/07/2018 11:46 AM    VLDL, calculated 11 06/07/2018 11:46 AM    Triglyceride 57 06/07/2018 11:46 AM       Endocrine Review  Patient is seen for followup of hypothyroidism. Since last visit: resumed back on Levothyroxine 50 mcg  She reports medication compliance: all the time and is taking separate from all her other meds. She reports the following concerns/problems/med side effects: none. Lab review: labs reviewed and discussed with patient. Lab Results   Component Value Date/Time    TSH 1.800 06/07/2018 11:46 AM    T4, Free 1.33 06/07/2018 11:46 AM     Knee Pain  Patient complains of bilaterally knee pain. Onset of the symptoms was several weeks ago. Inciting event: associated with recent working out in [facility]. Current symptoms include pain location: bilateral: anterior, medial and severity of pain = moderate. Associated symptoms: knee giving out and popping sensation.  Aggravating symptoms: going up and down stairs, squatting, kneeling, rising after sitting. Patient's overall course: stable and course of pain: stable Patient has had no prior knee problems. Patient denies fall or knee swelling, erythema or fever. Evaluation to date: none. Treatment to date: none. Review of Systems   Constitutional: Negative for chills, fever and malaise/fatigue. HENT: Negative for congestion, ear pain, sore throat and tinnitus. Eyes: Negative for blurred vision, double vision, pain and discharge. Respiratory: Negative for cough, shortness of breath and wheezing. Cardiovascular: Negative for chest pain, palpitations and leg swelling. Gastrointestinal: Negative for abdominal pain, blood in stool, constipation, diarrhea, nausea and vomiting. Genitourinary: Negative for dysuria, frequency, hematuria and urgency. Musculoskeletal: Negative for back pain, joint pain and myalgias. Bilateral knee pain   Skin: Negative for rash. Neurological: Negative for dizziness, tremors, seizures and headaches. Endo/Heme/Allergies: Negative for polydipsia. Does not bruise/bleed easily. Psychiatric/Behavioral: Negative for depression and substance abuse. The patient is not nervous/anxious. Physical Exam   Constitutional: She is oriented to person, place, and time. She appears well-developed and well-nourished. HENT:   Head: Normocephalic and atraumatic. Right Ear: External ear normal.   Mouth/Throat: Oropharynx is clear and moist. No oropharyngeal exudate. Eyes: Conjunctivae and EOM are normal. Pupils are equal, round, and reactive to light. No scleral icterus. Neck: Normal range of motion. Neck supple. No JVD present. No thyromegaly present. Cardiovascular: Normal rate, regular rhythm, normal heart sounds and intact distal pulses. No murmur heard. Pulmonary/Chest: Effort normal and breath sounds normal. She has no wheezes. Abdominal: Soft.  Bowel sounds are normal. She exhibits no distension and no mass.   Musculoskeletal: Normal range of motion. She exhibits no edema. Right knee: Tenderness found. Medial joint line tenderness noted. Left knee: Tenderness found. Medial joint line tenderness noted. Lymphadenopathy:     She has no cervical adenopathy. Neurological: She is alert and oriented to person, place, and time. She has normal reflexes. No cranial nerve deficit. Skin: Skin is warm and dry. No rash noted. She is not diaphoretic. Psychiatric: She has a normal mood and affect. Nursing note and vitals reviewed. ASSESSMENT and PLAN  Diagnoses and all orders for this visit:    1. Hypothyroidism, adult  -     TSH REFLEX TO T4    2. Dyslipidemia  -     METABOLIC PANEL, COMPREHENSIVE  -     LIPID PANEL    3. Encounter for immunization  -     IA IMMUNIZ ADMIN,1 SINGLE/COMB VAC/TOXOID  -     Influenza virus vaccine (QUADRIVALENT PRES FREE SYRINGE) IM (20952)    4. Travel advice encounter  -     mefloquine (LARIAM) 250 mg tablet; Take 250 mg by mouth every seven (7) days for 7 doses. Take with food and at least 8 ounces of water    5. Patellofemoral syndrome of both knees    discussed exercise and prn Aleve    Discussed lifestyle issues and health guidance given  Patient was given an after visit summary which includes diagnoses, vital signs, current medications, instructions and references & authorized prescriptions . Results of labs will be conveyed to patient, once available. Pt verbalized instructions I provided and expressed understanding of discussion that was held today.   Follow-up Disposition: Not on File

## 2018-10-10 NOTE — PATIENT INSTRUCTIONS
Learning About Healthy Travel Abroad  How can you stay healthy on your trip? The best way to stay healthy on your trip is to plan ahead. Talk with your doctor several months before you travel to another country. It's important to allow enough time to get the vaccine doses that you need. For example, if you need the hepatitis A vaccine, you'll need 2 doses spaced at least 6 months apart. Also ask your doctor if there are medicines or extra safety steps that you should take. Check with your local health department or travel health clinic for other travel tips. What can you do to prevent health problems? Get needed vaccines  · Make sure you are up to date with your routine shots. They can protect you from diseases such as polio, diphtheria, and measles. These diseases are still a problem in some developing countries. · Get other vaccines you need. Your doctor or a health clinic can tell you which ones you need for your travels. Here are some examples:  ¨ Hepatitis A vaccine, if you travel to developing countries. ¨ Yellow fever vaccine, if you visit places in [place] and [place] where the disease is active. ¨ Typhoid fever vaccine, if you travel to [facility] and [place], [place], or some areas of [place]. Bring medicines with you  · If you take medicines, bring a supply that will last the length of your trip. Get a letter from your doctor that lists your medical conditions and the medicines you take. Bring prescriptions for refills if you will be gone for a long time. Also bring any medical supplies you may need such as blood sugar testing supplies or insulin needles. · If you are going to an area where malaria is a risk, ask your doctor or health clinic for a prescription to help prevent infection. This medicine works best if you take it before, during, and after your trip. · You may want to bring medicine for traveler's diarrhea.  Over-the-counter medicines include:  ¨ Bismuth subsalicylate (Pepto-Bismol). ¨ Loperamide (Imodium). Your doctor may also prescribe an antibiotic to take with you. This can treat diarrhea if you're going to an area where modern medical care isn't readily available. Make safer choices as you travel  · Practice safer sex. Using condoms can prevent sexually transmitted infections. · In malaria-infected areas, use DEET insect repellent. Wear long pants and long-sleeved shirts, especially from dusk to jaocb. Use mosquito netting to protect yourself from bites while you sleep. · Many developing countries don't have safe tap water. Only have drinks made with boiled water, such as tea and coffee. Canned or bottled carbonated drinks, such as soda, beer, wine, or water, are usually safe. Don't use ice if you don't know what kind of water was used to make it. And don't use tap water to brush your teeth. · Be aware that you could be injured in cars, boats, or public transportation. Driving can be dangerous due to bad roads, poor  training, and crowded roadways. If you hire a  or taxi, ask the  to slow down or drive more carefully if you feel unsafe. · Air pollution in some large cities can be a problem if you have asthma or other breathing problems. Avoid those cities when air quality is poor. Or stay indoors as much as possible. · Be careful around dogs and other animals. Dogs in developing countries are often not tame and may bite. Rabies is more common in tropical and subtropical regions. · If you're going to a place that's much higher above sea level than you're used to, ask your doctor how to avoid altitude sickness. He or she may also prescribe medicine to help treat it. Where can you get the best information? · Use the Internet to find travel health information. Try these websites:  ¨ www.cdc.gov/travel. This website is for the Centers for Disease Control and Prevention (CDC). ¨ www.who.int/ith/en.  This website lists information from the [address] [facility] Organization (WHO) on travel, required immunizations, and disease outbreaks. · Find out where you can get the best medical care in the region you are visiting. See the [employer]'s website at www.useAppoet.gov. It lists every [place] embassy worldwide. It also lists some doctors and medical facilities in those countries. · Take along the phone numbers and addresses of embassies in the areas you will visit. They can help you find a doctor or hospital. Find out if your insurance company will cover you. You may want to get special travel health insurance. · If you are taking a cruise, you can find your ship's health record on this website: www.[facility].gov/nceh/vsp. Where can you learn more? Go to http://tcLeadGeniussoniya.info/. Enter R129 in the search box to learn more about \"Learning About Healthy Travel Abroad. \"  Current as of: March 29, 2018  Content Version: 11.8  © 1642-8882 [employer]. Care instructions adapted under license by [employer] (which disclaims liability or warranty for this information). If you have questions about a medical condition or this instruction, always ask your healthcare professional. [place] [address] any warranty or liability for your use of this information. Patellofemoral Pain Syndrome (Runner's Knee): Exercises  Your Care Instructions  Here are some examples of typical rehabilitation exercises for your condition. Start each exercise slowly. Ease off the exercise if you start to have pain. Your doctor or physical therapist will tell you when you can start these exercises and which ones will work best for you. How to do the exercises  Calf wall stretch    1. Stand facing a wall with your hands on the wall at about eye level. Put your affected leg about a step behind your other leg.   2. Keeping your back leg straight and your back heel on the floor, bend your front knee and gently bring your hip and chest toward the wall until you feel a stretch in the calf of your back leg. 3. Hold the stretch for at least 15 to 30 seconds. 4. Repeat 2 to 4 times. 5. Repeat steps 1 through 4, but this time keep your back knee bent. Quadriceps stretch    1. If you are not steady on your feet, hold on to a chair, counter, or wall. 2. Bend your affected leg, and reach behind you to grab the front of your foot or ankle with the hand on the same side. For example, if you are stretching your right leg, use your right hand. 3. Keeping your knees next to each other, pull your foot toward your buttock until you feel a gentle stretch across the front of your hip and down the front of your thigh. Your knee should be pointed directly to the ground, and not out to the side. 4. Hold the stretch for at least 15 to 30 seconds. 5. Repeat 2 to 4 times. Hamstring wall stretch    1. Lie on your back in a doorway, with your good leg through the open door. 2. Slide your affected leg up the wall to straighten your knee. You should feel a gentle stretch down the back of your leg. 1. Do not arch your back. 2. Do not bend either knee. 3. Keep one heel touching the floor and the other heel touching the wall. Do not point your toes. 3. Hold the stretch for at least 1 minute. Then over time, try to lengthen the time you hold the stretch to as long as 6 minutes. 4. Repeat 2 to 4 times. 5. If you do not have a place to do this exercise in a doorway, there is another way to do it:  6. Lie on your back, and bend your affected leg. 7. Loop a towel under the ball and toes of that foot, and hold the ends of the towel in your hands. 8. Straighten your knee, and slowly pull back on the towel. You should feel a gentle stretch down the back of your leg. 9. Hold the stretch for at least 15 to 30 seconds. Or even better, hold the stretch for 1 minute if you can. 10. Repeat 2 to 4 times. Quad sets    1.  Sit with your affected leg straight and supported on the floor or a firm bed. Place a small, rolled-up towel under your affected knee. Your other leg should be bent, with that foot flat on the floor. 2. Tighten the thigh muscles of your affected leg by pressing the back of your knee down into the towel. 3. Hold for about 6 seconds, then rest for up to 10 seconds. 4. Repeat 8 to 12 times. Straight-leg raises to the front    1. Lie on your back with your good knee bent so that your foot rests flat on the floor. Your affected leg should be straight. Make sure that your low back has a normal curve. You should be able to slip your hand in between the floor and the small of your back, with your palm touching the floor and your back touching the back of your hand. 2. Tighten the thigh muscles in your affected leg by pressing the back of your knee flat down to the floor. Hold your knee straight. 3. Keeping the thigh muscles tight and your leg straight, lift your affected leg up so that your heel is about 12 inches off the floor. 4. Hold for about 6 seconds, then lower your leg slowly. Rest for up to 10 seconds between repetitions. 5. Repeat 8 to 12 times. Straight-leg raises to the back    1. Lie on your stomach, and lift your leg straight up behind you (toward the ceiling). 2. Lift your toes about 6 inches off the floor, hold for about 6 seconds, then lower slowly. 3. Do 8 to 12 repetitions. Wall slide with ball squeeze    1. Stand with your back against a wall and with your feet about shoulder-width apart. Your feet should be about 12 inches away from the wall. 2. Put a ball about the size of a soccer ball between your knees. Then slowly slide down the wall until your knees are bent about 20 to 30 degrees. 3. Tighten your thigh muscles by squeezing the ball between your knees. Hold that position for about 10 seconds, then stop squeezing. Rest for up to 10 seconds between repetitions. 4. Repeat 8 to 12 times.   Follow-up care is a key part of your treatment and safety. Be sure to make and go to all appointments, and call your doctor if you are having problems. It's also a good idea to know your test results and keep a list of the medicines you take. Where can you learn more? Go to http://tc-soniya.info/. Enter A404 in the search box to learn more about \"Patellofemoral Pain Syndrome (Runner's Knee): Exercises. \"  Current as of: November 29, 2017  Content Version: 11.8  © 9614-8017 [employer]. Care instructions adapted under license by [employer] (which disclaims liability or warranty for this information). If you have questions about a medical condition or this instruction, always ask your healthcare professional. [address] any warranty or liability for your use of this information.

## 2018-10-11 ENCOUNTER — TELEPHONE (OUTPATIENT)
Dept: FAMILY MEDICINE CLINIC | Age: 53
End: 2018-10-11

## 2018-10-11 LAB
ALBUMIN SERPL-MCNC: 3.9 G/DL (ref 3.5–5.5)
ALBUMIN/GLOB SERPL: 1.3 {RATIO} (ref 1.2–2.2)
ALP SERPL-CCNC: 84 IU/L (ref 39–117)
ALT SERPL-CCNC: 6 IU/L (ref 0–32)
AST SERPL-CCNC: 15 IU/L (ref 0–40)
BILIRUB SERPL-MCNC: 0.8 MG/DL (ref 0–1.2)
BUN SERPL-MCNC: 8 MG/DL (ref 6–24)
BUN/CREAT SERPL: 12 (ref 9–23)
CALCIUM SERPL-MCNC: 9 MG/DL (ref 8.7–10.2)
CHLORIDE SERPL-SCNC: 107 MMOL/L (ref 96–106)
CHOLEST SERPL-MCNC: 208 MG/DL (ref 100–199)
CO2 SERPL-SCNC: 23 MMOL/L (ref 20–29)
CREAT SERPL-MCNC: 0.69 MG/DL (ref 0.57–1)
GLOBULIN SER CALC-MCNC: 2.9 G/DL (ref 1.5–4.5)
GLUCOSE SERPL-MCNC: 85 MG/DL (ref 65–99)
HDLC SERPL-MCNC: 53 MG/DL
INTERPRETATION, 910389: NORMAL
LDLC SERPL CALC-MCNC: 134 MG/DL (ref 0–99)
POTASSIUM SERPL-SCNC: 4.1 MMOL/L (ref 3.5–5.2)
PROT SERPL-MCNC: 6.8 G/DL (ref 6–8.5)
SODIUM SERPL-SCNC: 142 MMOL/L (ref 134–144)
TRIGL SERPL-MCNC: 107 MG/DL (ref 0–149)
TSH SERPL DL<=0.005 MIU/L-ACNC: 3.21 UIU/ML (ref 0.45–4.5)
VLDLC SERPL CALC-MCNC: 21 MG/DL (ref 5–40)

## 2018-10-11 NOTE — PROGRESS NOTES
Please inform patient  Kidney and liver function and thyroid are normal. To continue on same dose for thyroid  Cholesterol results are up again, not very bad, but to take her Simvastatin daily and to work on diet and exercise  thanks

## 2018-10-15 NOTE — PROGRESS NOTES
Outbound call to patient. Name and  verified. Reviewed recent labs with patient. Advised of provider recommendations. She verbalized understanding.

## 2019-04-03 ENCOUNTER — OFFICE VISIT (OUTPATIENT)
Dept: FAMILY MEDICINE CLINIC | Age: 54
End: 2019-04-03

## 2019-04-03 VITALS
SYSTOLIC BLOOD PRESSURE: 122 MMHG | HEART RATE: 63 BPM | TEMPERATURE: 97.9 F | OXYGEN SATURATION: 98 % | RESPIRATION RATE: 16 BRPM | HEIGHT: 72 IN | BODY MASS INDEX: 33.32 KG/M2 | WEIGHT: 246 LBS | DIASTOLIC BLOOD PRESSURE: 80 MMHG

## 2019-04-03 DIAGNOSIS — Z12.11 COLON CANCER SCREENING: ICD-10-CM

## 2019-04-03 DIAGNOSIS — Z00.00 ROUTINE GENERAL MEDICAL EXAMINATION AT A HEALTH CARE FACILITY: Primary | ICD-10-CM

## 2019-04-03 DIAGNOSIS — R07.89 COSTOCHONDRAL CHEST PAIN: ICD-10-CM

## 2019-04-03 DIAGNOSIS — Z12.39 BREAST CANCER SCREENING: ICD-10-CM

## 2019-04-03 DIAGNOSIS — E55.9 VITAMIN D DEFICIENCY: ICD-10-CM

## 2019-04-03 DIAGNOSIS — E66.3 OVER WEIGHT: ICD-10-CM

## 2019-04-03 NOTE — PROGRESS NOTES
HISTORY OF PRESENT ILLNESS  Erick Raymnudo is a 62 y.o. female. seen today for complete physical  Returned back from her country in 02/2019. Since not taking any medicines as she was out of it. Concerned about left sided chest pain. HPI  Health Maintenance  Immunizations:    Influenza: up to date. Tetanus: UTD as she received before her travel, record requested. Shingles: not UTD - script provided. Pneumonia: n/a. Cancer screening:    Cervical: reviewed guidelines, UTD, done by OBGYN, records requested. Breast: reviewed guidelines, order placed. Colon: reviewed guidelines, reviewed screening modalities & elected for FOBT. Chest Wall Pain  Patient presents for presents evaluation of chest wall pain. Onset was 2 months ago. Pain description: character of chest pain: sharp, stabbing  location: anterior chest wall: left sided  severity = mild  course since onset: symptoms have progressed to a point and plateaued. denies shortness of breath, hemoptysis and anginal type chest pain. Mechanism of injury: lifted heavy luggage during her travel and doing cleaning of home since her return. Previous visits for this problem: none. Evaluation to date: none  Treatment to date: none      Patient Care Team:  Odessa Fry MD as PCP - General (Family Practice)  Gabo Padgett MD (Orthopedic Surgery)      The following sections were reviewed & updated as appropriate: PMH, PSH, FH, and SH. Patient Active Problem List   Diagnosis Code    Anemia D64.9    Dyslipidemia E78.5    Vitamin D deficiency E55.9    Hypothyroidism, adult E03.9    Gastroesophageal reflux disease without esophagitis K21.9    Over weight E66.3          Prior to Admission medications    Medication Sig Start Date End Date Taking?  Authorizing Provider   simvastatin (ZOCOR) 20 mg tablet TAKE ONE TABLET BY MOUTH NIGHTLY 8/8/18   Odessa Fry MD   levothyroxine (SYNTHROID) 50 mcg tablet TAKE 1 TABLET BY MOUTH BEFORE BREAKFAST 8/8/18   Angelic Colon MD   cholecalciferol, vitamin D3, (VITAMIN D3) 2,000 unit tab Take  by mouth. Provider, Historical          No Known Allergies     Review of Systems   Constitutional: Negative for chills, fever and malaise/fatigue. HENT: Negative for congestion, ear pain, sore throat and tinnitus. Eyes: Negative for blurred vision, double vision, pain and discharge. Respiratory: Negative for cough, shortness of breath and wheezing. Cardiovascular: Positive for chest pain. Negative for palpitations and leg swelling. Gastrointestinal: Negative for abdominal pain, blood in stool, constipation, diarrhea, nausea and vomiting. Genitourinary: Negative for dysuria, frequency, hematuria and urgency. Musculoskeletal: Negative for back pain, joint pain and myalgias. Skin: Negative for rash. Neurological: Negative for dizziness, tremors, seizures and headaches. Endo/Heme/Allergies: Negative for polydipsia. Does not bruise/bleed easily. Psychiatric/Behavioral: Negative for depression and substance abuse. The patient is not nervous/anxious. Physical Exam   Constitutional: She is oriented to person, place, and time. She appears well-developed and well-nourished. HENT:   Head: Normocephalic and atraumatic. Right Ear: External ear normal.   Left Ear: External ear normal.   Nose: Nose normal.   Mouth/Throat: Oropharynx is clear and moist.   Eyes: Pupils are equal, round, and reactive to light. Conjunctivae and EOM are normal. No scleral icterus. Neck: Normal range of motion. Neck supple. No JVD present. No thyromegaly present. Cardiovascular: Normal rate, regular rhythm, normal heart sounds and intact distal pulses. Exam reveals no gallop and no friction rub. No murmur heard. Pulmonary/Chest: Effort normal and breath sounds normal. She has no wheezes. She has no rales. She exhibits no tenderness.  Right breast exhibits no inverted nipple, no mass, no nipple discharge, no skin change and no tenderness. Left breast exhibits no inverted nipple, no mass, no nipple discharge, no skin change and no tenderness. Breasts are symmetrical.       Abdominal: Soft. Bowel sounds are normal. She exhibits no distension and no mass. There is no tenderness. Musculoskeletal: Normal range of motion. She exhibits no edema or tenderness. Lymphadenopathy:     She has no cervical adenopathy. Neurological: She is alert and oriented to person, place, and time. She has normal reflexes. No cranial nerve deficit. Skin: Skin is warm and dry. No rash noted. Psychiatric: She has a normal mood and affect. Her behavior is normal. Judgment and thought content normal.   Nursing note and vitals reviewed. ASSESSMENT and PLAN  Diagnoses and all orders for this visit:    1. Routine general medical examination at a health care facility  -     CBC WITH AUTOMATED DIFF  -     METABOLIC PANEL, COMPREHENSIVE  -     HEMOGLOBIN A1C WITH EAG  -     TSH REFLEX TO T4  -     URINALYSIS W/ RFLX MICROSCOPIC  -     LIPID PANEL    2. Colon cancer screening  -     OCCULT BLOOD IMMUNOASSAY,DIAGNOSTIC    3. Over weight  Assessment & Plan:  improved, congratulated, I have reviewed/discussed the above normal BMI with the patient. I have recommended the following interventions: encourage exercise and lifestyle education regarding diet. 4. Vitamin D deficiency  -     VITAMIN D, 25 HYDROXY    5. Breast cancer screening  -     [facility] MAMMO BI SCREENING INCL CAD; Future    6. Costochondral chest pain  reassured. Advised for heating pad  Discussed lifestyle issues and health guidance given  Patient was given an after visit summary which includes diagnoses, vital signs, current medications, instructions and references & authorized prescriptions . Results of labs will be conveyed to patient, once available. Pt verbalized instructions I provided and expressed understanding of discussion that was held today.   Follow-up and Dispositions · Return in about 6 months (around 10/3/2019) for fasting, follow up.

## 2019-04-03 NOTE — PROGRESS NOTES
Chief Complaint   Patient presents with    Complete Physical     fasting     Chest Pain     left sided chest discomfort above breast radiating to back , symptoms occuring for 3 weeks     Nail Problem     toe pains and finger pain and discoloration. out of medication for problem    Other     return to [facility] on February 2019       1. Have you been to the ER, urgent care clinic since your last visit? Hospitalized since your last visit? No    2. Have you seen or consulted any other health care providers outside of the [address] [place] [address] since your last visit? Include any pap smears or colon screening.  No

## 2019-04-03 NOTE — ASSESSMENT & PLAN NOTE
improved, congratulated, I have reviewed/discussed the above normal BMI with the patient. I have recommended the following interventions: encourage exercise and lifestyle education regarding diet.

## 2019-04-03 NOTE — PATIENT INSTRUCTIONS

## 2019-04-04 LAB
25(OH)D3+25(OH)D2 SERPL-MCNC: 26.3 NG/ML (ref 30–100)
ALBUMIN SERPL-MCNC: 3.8 G/DL (ref 3.5–5.5)
ALBUMIN/GLOB SERPL: 1.2 {RATIO} (ref 1.2–2.2)
ALP SERPL-CCNC: 90 IU/L (ref 39–117)
ALT SERPL-CCNC: 6 IU/L (ref 0–32)
AST SERPL-CCNC: 9 IU/L (ref 0–40)
BASOPHILS # BLD AUTO: 0 X10E3/UL (ref 0–0.2)
BASOPHILS NFR BLD AUTO: 1 %
BILIRUB SERPL-MCNC: 0.8 MG/DL (ref 0–1.2)
BUN SERPL-MCNC: 8 MG/DL (ref 6–24)
BUN/CREAT SERPL: 10 (ref 9–23)
CALCIUM SERPL-MCNC: 8.9 MG/DL (ref 8.7–10.2)
CHLORIDE SERPL-SCNC: 107 MMOL/L (ref 96–106)
CHOLEST SERPL-MCNC: 214 MG/DL (ref 100–199)
CO2 SERPL-SCNC: 24 MMOL/L (ref 20–29)
CREAT SERPL-MCNC: 0.78 MG/DL (ref 0.57–1)
EOSINOPHIL # BLD AUTO: 0.3 X10E3/UL (ref 0–0.4)
EOSINOPHIL NFR BLD AUTO: 6 %
ERYTHROCYTE [DISTWIDTH] IN BLOOD BY AUTOMATED COUNT: 14.4 % (ref 12.3–15.4)
EST. AVERAGE GLUCOSE BLD GHB EST-MCNC: 105 MG/DL
GLOBULIN SER CALC-MCNC: 3.2 G/DL (ref 1.5–4.5)
GLUCOSE SERPL-MCNC: 84 MG/DL (ref 65–99)
GLUCOSE UR QL: NORMAL
HBA1C MFR BLD: 5.3 % (ref 4.8–5.6)
HCT VFR BLD AUTO: 38.2 % (ref 34–46.6)
HDLC SERPL-MCNC: 56 MG/DL
HGB BLD-MCNC: 12.9 G/DL (ref 11.1–15.9)
IMM GRANULOCYTES # BLD AUTO: 0 X10E3/UL (ref 0–0.1)
IMM GRANULOCYTES NFR BLD AUTO: 0 %
INTERPRETATION, 910389: NORMAL
KETONES UR QL STRIP: NORMAL
LDLC SERPL CALC-MCNC: 144 MG/DL (ref 0–99)
LYMPHOCYTES # BLD AUTO: 1.7 X10E3/UL (ref 0.7–3.1)
LYMPHOCYTES NFR BLD AUTO: 40 %
MCH RBC QN AUTO: 30.7 PG (ref 26.6–33)
MCHC RBC AUTO-ENTMCNC: 33.8 G/DL (ref 31.5–35.7)
MCV RBC AUTO: 91 FL (ref 79–97)
MONOCYTES # BLD AUTO: 0.3 X10E3/UL (ref 0.1–0.9)
MONOCYTES NFR BLD AUTO: 8 %
NEUTROPHILS # BLD AUTO: 1.9 X10E3/UL (ref 1.4–7)
NEUTROPHILS NFR BLD AUTO: 45 %
PH UR STRIP: NORMAL [PH]
PLATELET # BLD AUTO: 252 X10E3/UL (ref 150–379)
POTASSIUM SERPL-SCNC: 4.1 MMOL/L (ref 3.5–5.2)
PROT SERPL-MCNC: 7 G/DL (ref 6–8.5)
PROT UR QL STRIP: NORMAL
RBC # BLD AUTO: 4.2 X10E6/UL (ref 3.77–5.28)
SODIUM SERPL-SCNC: 144 MMOL/L (ref 134–144)
SP GR UR: NORMAL
TRIGL SERPL-MCNC: 71 MG/DL (ref 0–149)
TSH SERPL DL<=0.005 MIU/L-ACNC: 3.68 UIU/ML (ref 0.45–4.5)
VLDLC SERPL CALC-MCNC: 14 MG/DL (ref 5–40)
WBC # BLD AUTO: 4.2 X10E3/UL (ref 3.4–10.8)

## 2019-04-04 NOTE — PROGRESS NOTES
Please inform patient  Blood count,kidney,liver,diabetes screen,thyroid,urine results normal  Vitamin d borderline low, to take vitamin d 2000 units daily  Cholesterol result ok, as she was out of medicines  To continue on same medicines otherwise  thanks

## 2019-04-05 NOTE — PROGRESS NOTES
Call placed to patient. Name and  verified. Reviewed recent labs with patient. She verbalized understanding. Advised to take Vitamin D 2000 units daily.

## 2019-04-08 ENCOUNTER — HOSPITAL ENCOUNTER (OUTPATIENT)
Dept: MAMMOGRAPHY | Age: 54
Discharge: HOME | End: 2019-04-08
Attending: FAMILY MEDICINE
Payer: COMMERCIAL

## 2019-04-08 DIAGNOSIS — Z12.31 VISIT FOR SCREENING MAMMOGRAM: ICD-10-CM

## 2019-04-08 PROCEDURE — 77067 SCR MAMMO BI INCL CAD: CPT

## 2019-04-24 ENCOUNTER — TELEPHONE (OUTPATIENT)
Dept: FAMILY MEDICINE CLINIC | Age: 54
End: 2019-04-24

## 2019-04-24 ENCOUNTER — HOSPITAL ENCOUNTER (OUTPATIENT)
Dept: MAMMOGRAPHY | Age: 54
Discharge: HOME | End: 2019-04-24
Attending: FAMILY MEDICINE
Payer: COMMERCIAL

## 2019-04-24 ENCOUNTER — APPOINTMENT (OUTPATIENT)
Dept: MAMMOGRAPHY | Age: 54
End: 2019-04-24
Attending: FAMILY MEDICINE
Payer: COMMERCIAL

## 2019-04-24 DIAGNOSIS — R92.8 ABNORMAL MAMMOGRAM: ICD-10-CM

## 2019-04-24 PROCEDURE — 76642 ULTRASOUND BREAST LIMITED: CPT

## 2019-04-24 PROCEDURE — 77065 DX MAMMO INCL CAD UNI: CPT

## 2019-04-24 NOTE — PROGRESS NOTES
Pt did not want to make an appt today for the bx due to work schedule. Dr Ramírez Norman stated that this can wait 2 weeks but not to wait to long. Pt acknowledges this and understands the importance of the bx. I spoke with Zoila Smith at Dr Ayana Mane office and made her aware of this. She will have Dr Vamsi Rondon call the pt.   tobinw

## 2019-04-24 NOTE — TELEPHONE ENCOUNTER
Inbound call from Kia at [facility]. Patients name and  verified. Dallas Diegoiths stated that patient had an abnormal mammogram. A small mass was noted which is possibly cancerous. Patient declined to make an appointment to have the biopsy. She was educated on the reasoning behind the biopsy. She was provided with the buisness card for their office to call and schedule. Can you call the patient to discuss the findings and reasoning for biopsy. Order is in connect care if you could sign off on it as well.

## 2019-05-07 NOTE — PROGRESS NOTES
Called pt to follow up on the biopsy recommendation, pt stated she was at work and couldn't talk. She will call back.

## 2019-05-29 NOTE — PROGRESS NOTES
SANDER/spring with Dr. Oneil Holly nurse to inform her pt has not scheduled the biopsy yet or returned our call.

## 2019-06-26 ENCOUNTER — HOSPITAL ENCOUNTER (OUTPATIENT)
Dept: MAMMOGRAPHY | Age: 54
Discharge: HOME | End: 2019-06-26
Attending: FAMILY MEDICINE
Payer: COMMERCIAL

## 2019-06-26 DIAGNOSIS — N63.10 MASS OF RIGHT BREAST: ICD-10-CM

## 2019-06-26 DIAGNOSIS — R92.8 ABNORMAL MAMMOGRAM: ICD-10-CM

## 2019-06-26 PROCEDURE — 74011250636 HC RX REV CODE- 250/636: Performed by: RADIOLOGY

## 2019-06-26 PROCEDURE — 88305 TISSUE EXAM BY PATHOLOGIST: CPT

## 2019-06-26 PROCEDURE — 74011000250 HC RX REV CODE- 250: Performed by: RADIOLOGY

## 2019-06-26 PROCEDURE — 77065 DX MAMMO INCL CAD UNI: CPT

## 2019-06-26 PROCEDURE — 19083 BX BREAST 1ST LESION US IMAG: CPT

## 2019-06-26 PROCEDURE — 88360 TUMOR IMMUNOHISTOCHEM/MANUAL: CPT

## 2019-06-26 RX ORDER — LIDOCAINE HYDROCHLORIDE 10 MG/ML
15 INJECTION INFILTRATION; PERINEURAL
Status: COMPLETED | OUTPATIENT
Start: 2019-06-26 | End: 2019-06-26

## 2019-06-26 RX ORDER — LIDOCAINE HYDROCHLORIDE AND EPINEPHRINE 10; 10 MG/ML; UG/ML
10 INJECTION, SOLUTION INFILTRATION; PERINEURAL ONCE
Status: COMPLETED | OUTPATIENT
Start: 2019-06-26 | End: 2019-06-26

## 2019-06-26 RX ADMIN — LIDOCAINE HYDROCHLORIDE,EPINEPHRINE BITARTRATE 100 MG: 10; .01 INJECTION, SOLUTION INFILTRATION; PERINEURAL at 09:15

## 2019-06-26 RX ADMIN — LIDOCAINE HYDROCHLORIDE 15 ML: 10 INJECTION, SOLUTION INFILTRATION; PERINEURAL at 09:15

## 2019-06-26 NOTE — PROGRESS NOTES
Patient tolerated right breast biopsy well with minimal bleeding. Biopsy site was bandaged in the usual fashion and discharge instructions were reviewed with the patient. She was provided with a written copy as well. Advised patient that results should be available by Friday and that she will receive a phone call in the afternoon. Patient requests that we call her daughter, Fang Lafleur to provide results. Encouraged her to call with any questions or concerns.

## 2019-06-26 NOTE — DISCHARGE INSTRUCTIONS
Breast Biopsy Discharge Instructions    PAIN CONTROL     You may have mild discomfort; take 1-2 Tylenol every 4-6 hours as needed.  Do not take aspirin containing products or anti-inflammatory medications (Advil, Aleve, Motrin, Ibuprofen, etc.) for 24 hours.  Wearing a comfortable bra for support may help with discomfort. WOUND CARE      A small amount of bleeding or bruising at the biopsy site is normal. Watch for signs and symptoms of infections: skin warm to touch, yellowish drainage from wound, fever or severe pain.  Place an ice pack over the site hourly, 20-30 at a time for a few hours today.  The clear dressing is water resistant (you may shower, but do not allow the dressing to become saturated). You may remove the dressing in 48 hours. The steri-strips (small pieces of tape covering the incision) will fall off on their own in a few days. If the clear dressing irritates your skin or begins to peel off, you may remove it. Remember, if you remove the clear dressing before 48 hours, you should not get the site wet.  If at any point you have EXCESSIVE BLEEDING (saturating the gauze under the clear dressing) OR pain please call:    Daytime 7:30am-5:00pm: [facility] [phone]    After Hours:    [phone] (ask to speak to a radiologist)              or [address] [place] [address] [phone]    ACTIVITY     Do not participate in any strenuous activities for 24 hours (exercise, sports, housework, etc.).  You may resume work (light duty only) for the first 24 hours.  No heavy lifting with the arm on the affected side of your body. ADDITIONAL INSTRUCTIONS    We will call you with results on Friday June 28 in the afternoon.       YOUR TREATMENT TEAM TODAY WAS    MD: Amarjit Alvarez  RN: Caron Calhoun  Radiology Tech: [facility]

## 2019-06-28 NOTE — PROGRESS NOTES
Call placed to patient. Name and  verified. Attempted to discuss biopsy results with patient. She requested me to speak with her daughter. Daughter is on PHI. Call placed to daughter no answer left message to return call.

## 2019-06-28 NOTE — PROGRESS NOTES
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 Has an appointment scheduled with Dr Mine Parmar.  To keep it  thanks

## 2019-07-01 NOTE — PROGRESS NOTES
Call placed to patients daughter second attempt. No answer left message to return call regarding test results. Letter printed with results.

## 2019-07-10 ENCOUNTER — OFFICE VISIT (OUTPATIENT)
Dept: SURGERY | Age: 54
End: 2019-07-10

## 2019-07-10 VITALS
SYSTOLIC BLOOD PRESSURE: 149 MMHG | WEIGHT: 241 LBS | BODY MASS INDEX: 32.64 KG/M2 | HEIGHT: 72 IN | DIASTOLIC BLOOD PRESSURE: 76 MMHG | HEART RATE: 67 BPM

## 2019-07-10 DIAGNOSIS — D05.11 DUCTAL CARCINOMA IN SITU (DCIS) OF RIGHT BREAST: ICD-10-CM

## 2019-07-10 NOTE — PROGRESS NOTES
HISTORY OF PRESENT ILLNESS  Germania Merlos is a 62 y.o. female. HPI  NEW patient here today referred for consultation at the request of Dr. Chris Hwang for newly diagnosed RIGHT breast cancer. Suspicion found on imaging, which led to RIGHT breast biopsy. She is accompanied by her son.      06/26/19: RIGHT breast bx, 2:00. PATH: DCIS, mucocele-like lesion with atypical ductal hyperplasia and in situ carcinoma, ER+(100%)/MI+(100%). Clinical stage 0.     No FH of breast or ovarian cancer.      Recent imaging-   Bilateral screening mammogram on 4/8/19- BIRADS 0  RIGHT diagnostic mammogram and ultrasound on 4/24/19- BIRADS 4  FINDINGS:   Mammography:  Additional right breast views demonstrate a persistent mass in the upper inner  right breast, middle depth, measuring 5 to 7 mm. There are no suspicious  pleomorphic calcifications. No skin thickening or nipple retraction.      Ultrasound:  Right breast ultrasound at the 2:00 position 5 cm from the nipple demonstrates a  hypoechoic antiparallel mass with angular margins measuring 6 x 7 x 6 mm.     IMPRESSION: Right breast ultrasound finding of a suspicious mass correlate with  mammographic finding.     Recommendation:  Ultrasound guided biopsy of a suspicious right breast mass.     BI-RADS Assessment Category 4: Suspicious abnormality- Biopsy should be  considered.       Past Medical History:   Diagnosis Date    Anemia     resolved after tx     H.  PYLORI INFECTION     tx in  2008 no stool Ag to confirm tx    Hypercholesterolemia     Hypertension        Past Surgical History:   Procedure Laterality Date    HX APPENDECTOMY         Social History     Socioeconomic History    Marital status:      Spouse name: Not on file    Number of children: Not on file    Years of education: Not on file    Highest education level: Not on file   Occupational History    Not on file   Social Needs    Financial resource strain: Not on file    Food insecurity:     Worry: Not on file     Inability: Not on file    Transportation needs:     Medical: Not on file     Non-medical: Not on file   Tobacco Use    Smoking status: Never Smoker    Smokeless tobacco: Never Used   Substance and Sexual Activity    Alcohol use: No     Alcohol/week: 0.0 oz    Drug use: No    Sexual activity: Never   Lifestyle    Physical activity:     Days per week: Not on file     Minutes per session: Not on file    Stress: Not on file   Relationships    Social connections:     Talks on phone: Not on file     Gets together: Not on file     Attends Religion service: Not on file     Active member of club or organization: Not on file     Attends meetings of clubs or organizations: Not on file     Relationship status: Not on file    Intimate partner violence:     Fear of current or ex partner: Not on file     Emotionally abused: Not on file     Physically abused: Not on file     Forced sexual activity: Not on file   Other Topics Concern    Not on file   Social History Narrative    ** Merged History Encounter **            Current Outpatient Medications on File Prior to Visit   Medication Sig Dispense Refill    simvastatin (ZOCOR) 20 mg tablet TAKE ONE TABLET BY MOUTH NIGHTLY 90 Tab 1    levothyroxine (SYNTHROID) 50 mcg tablet TAKE 1 TABLET BY MOUTH BEFORE BREAKFAST 90 Tab 1    cholecalciferol, vitamin D3, (VITAMIN D3) 2,000 unit tab Take  by mouth. No current facility-administered medications on file prior to visit. No Known Allergies    OB History    None         ROS  Constitutional: Negative. HENT: Negative. Eyes: Negative. Respiratory: Negative. Cardiovascular: Negative. Gastrointestinal: Negative. Genitourinary: Negative. Musculoskeletal: Negative. Skin: Negative. Neurological: Negative. Endo/Heme/Allergies: Negative. Psychiatric/Behavioral: Negative. Physical Exam   Cardiovascular: Normal rate, regular rhythm and normal heart sounds.    Pulmonary/Chest: Breath sounds normal. Right breast exhibits mass. Right breast exhibits no inverted nipple, no nipple discharge, no skin change and no tenderness. Left breast exhibits no inverted nipple, no mass, no nipple discharge, no skin change and no tenderness. Breasts are symmetrical.       Lymphadenopathy:     She has no cervical adenopathy. Right cervical: No superficial cervical, no deep cervical and no posterior cervical adenopathy present. Left cervical: No superficial cervical, no deep cervical and no posterior cervical adenopathy present. She has no axillary adenopathy. Right axillary: No pectoral and no lateral adenopathy present. Left axillary: No pectoral and no lateral adenopathy present. BREAST ULTRASOUND, Preop planning  Indication: Pre-op planning right Breast 2 o'clock   Technique: The area was scanned using a high-frequency linear-array near-field transducer  Findings: Mass, bx clip  Impression: Breast cancer  Disposition:  Surgery    ASSESSMENT and PLAN    ICD-10-CM ICD-9-CM    1. Ductal carcinoma in situ (DCIS) of right breast D05.11 233.0       Pt presents for for consultation for treatment of RIGHT breast DCIS with mucocele-like lesion, ER+/OH+, clinical stage 0. Palpable mass on exam at RIGHT breast 2:00. US visualizes mass and bx clip. We had a long discussion of options for treatment. A total of 60 minutes were spent face-to-face with the patient, accompanied by her son, during this encounter, and over half of that time was spent on counseling and coordination of care. We discussed in depth the pathology results, and the need for surgery following MRI for extent of disease and surgical planning. The goals of treatment are to treat the breast, ensure that there is no invasive component, and to reduce risk of recurrence of this cancer.     Discussed treatment options with risks, complications, benefits, and limitations, including lumpectomy with possible XRT, and mastectomy with reconstruction, both having the same cure rate. Discussed lumpectomy. Reviewed the importance of negative margins, and that if margins are positive for cancer cells, we will re-excise in a second surgery. Reviewed that invasive cancer is sometimes found in conjunction with mucocele. Because of mass, will plan for SLNBx of 3-4 LNs during initial surgery; discussed benefit and technique of this procedure. Lumpectomy and SLNBx is an outpatient operation, with expected 1-2 week recovery time. Will send for DCISionRT to help determine need for XRT. If invasive cancer is found, will likely plan for XRT and hormonal therapy with estrogen blocker. Pt understands and consents to surgery. Will schedule for the near future, and scheduling will f/u with the date. This plan was reviewed with the patient and patient agrees. All questions were answered.     Written by Raymond Major, as dictated by Dr. Zita Atkins MD.

## 2019-07-10 NOTE — LETTER
7/15/2019 11:34 AM    Patient:  Randell Jj   YOB: 1965  Date of Visit: 7/10/2019      Dear Dr. Pastor Diaz: Thank you for referring Ms. Alo Majano to me for evaluation/treatment. Below are the relevant portions of my assessment and plan of care. HISTORY OF PRESENT ILLNESS  Randell Jj is a 62 y.o. female. HPI  NEW patient here today referred for consultation at the request of Dr. Pastor Diaz for newly diagnosed RIGHT breast cancer. Suspicion found on imaging, which led to RIGHT breast biopsy. She is accompanied by her son.      06/26/19: RIGHT breast bx, 2:00. PATH: DCIS, mucocele-like lesion with atypical ductal hyperplasia and in situ carcinoma, ER+(100%)/CT+(100%). Clinical stage 0.     No FH of breast or ovarian cancer.      Recent imaging-   Bilateral screening mammogram on 4/8/19- BIRADS 0  RIGHT diagnostic mammogram and ultrasound on 4/24/19- BIRADS 4  FINDINGS:   Mammography:  Additional right breast views demonstrate a persistent mass in the upper inner  right breast, middle depth, measuring 5 to 7 mm. There are no suspicious  pleomorphic calcifications. No skin thickening or nipple retraction.      Ultrasound:  Right breast ultrasound at the 2:00 position 5 cm from the nipple demonstrates a  hypoechoic antiparallel mass with angular margins measuring 6 x 7 x 6 mm.     IMPRESSION: Right breast ultrasound finding of a suspicious mass correlate with  mammographic finding.     Recommendation:  Ultrasound guided biopsy of a suspicious right breast mass.     BI-RADS Assessment Category 4: Suspicious abnormality- Biopsy should be  considered.       Past Medical History:   Diagnosis Date    Anemia     resolved after tx     H.  PYLORI INFECTION     tx in  2008 no stool Ag to confirm tx    Hypercholesterolemia     Hypertension        Past Surgical History:   Procedure Laterality Date    HX APPENDECTOMY         Social History     Socioeconomic History    Marital status:  Spouse name: Not on file    Number of children: Not on file    Years of education: Not on file    Highest education level: Not on file   Occupational History    Not on file   Social Needs    Financial resource strain: Not on file    Food insecurity:     Worry: Not on file     Inability: Not on file    Transportation needs:     Medical: Not on file     Non-medical: Not on file   Tobacco Use    Smoking status: Never Smoker    Smokeless tobacco: Never Used   Substance and Sexual Activity    Alcohol use: No     Alcohol/week: 0.0 oz    Drug use: No    Sexual activity: Never   Lifestyle    Physical activity:     Days per week: Not on file     Minutes per session: Not on file    Stress: Not on file   Relationships    Social connections:     Talks on phone: Not on file     Gets together: Not on file     Attends Mandaeism service: Not on file     Active member of club or organization: Not on file     Attends meetings of clubs or organizations: Not on file     Relationship status: Not on file    Intimate partner violence:     Fear of current or ex partner: Not on file     Emotionally abused: Not on file     Physically abused: Not on file     Forced sexual activity: Not on file   Other Topics Concern    Not on file   Social History Narrative    ** Merged History Encounter **            Current Outpatient Medications on File Prior to Visit   Medication Sig Dispense Refill    simvastatin (ZOCOR) 20 mg tablet TAKE ONE TABLET BY MOUTH NIGHTLY 90 Tab 1    levothyroxine (SYNTHROID) 50 mcg tablet TAKE 1 TABLET BY MOUTH BEFORE BREAKFAST 90 Tab 1    cholecalciferol, vitamin D3, (VITAMIN D3) 2,000 unit tab Take  by mouth. No current facility-administered medications on file prior to visit. No Known Allergies    OB History    None         ROS  Constitutional: Negative. HENT: Negative. Eyes: Negative. Respiratory: Negative. Cardiovascular: Negative. Gastrointestinal: Negative. Genitourinary: Negative. Musculoskeletal: Negative. Skin: Negative. Neurological: Negative. Endo/Heme/Allergies: Negative. Psychiatric/Behavioral: Negative. Physical Exam   Cardiovascular: Normal rate, regular rhythm and normal heart sounds. Pulmonary/Chest: Breath sounds normal. Right breast exhibits mass. Right breast exhibits no inverted nipple, no nipple discharge, no skin change and no tenderness. Left breast exhibits no inverted nipple, no mass, no nipple discharge, no skin change and no tenderness. Breasts are symmetrical.       Lymphadenopathy:     She has no cervical adenopathy. Right cervical: No superficial cervical, no deep cervical and no posterior cervical adenopathy present. Left cervical: No superficial cervical, no deep cervical and no posterior cervical adenopathy present. She has no axillary adenopathy. Right axillary: No pectoral and no lateral adenopathy present. Left axillary: No pectoral and no lateral adenopathy present. BREAST ULTRASOUND, Preop planning  Indication: Pre-op planning right Breast 2 o'clock   Technique: The area was scanned using a high-frequency linear-array near-field transducer  Findings: Mass, bx clip  Impression: Breast cancer  Disposition:  Surgery    ASSESSMENT and PLAN    ICD-10-CM ICD-9-CM    1. Ductal carcinoma in situ (DCIS) of right breast D05.11 233.0       Pt presents for for consultation for treatment of RIGHT breast DCIS with mucocele-like lesion, ER+/ME+, clinical stage 0. Palpable mass on exam at RIGHT breast 2:00. US visualizes mass and bx clip. We had a long discussion of options for treatment. A total of 60 minutes were spent face-to-face with the patient, accompanied by her son, during this encounter, and over half of that time was spent on counseling and coordination of care.  We discussed in depth the pathology results, and the need for surgery following MRI for extent of disease and surgical planning. The goals of treatment are to treat the breast, ensure that there is no invasive component, and to reduce risk of recurrence of this cancer. Discussed treatment options with risks, complications, benefits, and limitations, including lumpectomy with possible XRT, and mastectomy with reconstruction, both having the same cure rate. Discussed lumpectomy. Reviewed the importance of negative margins, and that if margins are positive for cancer cells, we will re-excise in a second surgery. Reviewed that invasive cancer is sometimes found in conjunction with mucocele. Because of mass, will plan for SLNBx of 3-4 LNs during initial surgery; discussed benefit and technique of this procedure. Lumpectomy and SLNBx is an outpatient operation, with expected 1-2 week recovery time. Will send for DCISionRT to help determine need for XRT. If invasive cancer is found, will likely plan for XRT and hormonal therapy with estrogen blocker. Pt understands and consents to surgery. Will schedule for the near future, and scheduling will f/u with the date. This plan was reviewed with the patient and patient agrees. All questions were answered. Written by Khurram Lara, as dictated by Dr. Elvia Ball MD.      If you have questions, please do not hesitate to call me. I look forward to following Ms. Majano along with you.         Sincerely,      Yanet Goodwin MD

## 2019-07-10 NOTE — PATIENT INSTRUCTIONS

## 2019-07-10 NOTE — PROGRESS NOTES
HISTORY OF PRESENT ILLNESS  Jeani Apgar is a 62 y.o. female. HPI   NEW patient here today referred for consultation at the request of Dr. Jim for newly diagnosed RIGHT breast cancer. Suspicion found on imaging, which led to RIGHT breast biopsy. She is accompanied by her son. 19: RIGHT breast bx, 2:00. PATH: DCIS, mucocele-like lesion with atypical ductal hyperplasia and in situ carcinoma, ER+(100%)/CT+(100%). Clinical stage 0. No FH of breast or ovarian cancer. Recent imaging-   Bilateral screening mammogram on 19- BIRADS 0  RIGHT diagnostic mammogram and ultrasound on 19- BIRADS 4  FINDINGS:   Mammography:  Additional right breast views demonstrate a persistent mass in the upper inner  right breast, middle depth, measuring 5 to 7 mm. There are no suspicious  pleomorphic calcifications. No skin thickening or nipple retraction.      Ultrasound:  Right breast ultrasound at the 2:00 position 5 cm from the nipple demonstrates a  hypoechoic antiparallel mass with angular margins measuring 6 x 7 x 6 mm.     IMPRESSION: Right breast ultrasound finding of a suspicious mass correlate with  mammographic finding.     Recommendation:  Ultrasound guided biopsy of a suspicious right breast mass.     BI-RADS Assessment Category 4: Suspicious abnormality- Biopsy should be  considered. Review of Systems   Constitutional: Negative. HENT: Negative. Eyes: Negative. Respiratory: Negative. Cardiovascular: Negative. Gastrointestinal: Negative. Genitourinary: Negative. Musculoskeletal: Negative. Skin: Negative. Neurological: Negative. Endo/Heme/Allergies: Negative. Psychiatric/Behavioral: Negative.         Physical Exam    ASSESSMENT and PLAN  {ASSESSMENT/PLAN:10582}

## 2019-07-15 NOTE — COMMUNICATION BODY
HISTORY OF PRESENT ILLNESS  Jose Cohen is a 62 y.o. female. HPI  NEW patient here today referred for consultation at the request of Dr. Julia Turcios for newly diagnosed RIGHT breast cancer. Suspicion found on imaging, which led to RIGHT breast biopsy. She is accompanied by her son.      06/26/19: RIGHT breast bx, 2:00. PATH: DCIS, mucocele-like lesion with atypical ductal hyperplasia and in situ carcinoma, ER+(100%)/TN+(100%). Clinical stage 0.     No FH of breast or ovarian cancer.      Recent imaging-   Bilateral screening mammogram on 4/8/19- BIRADS 0  RIGHT diagnostic mammogram and ultrasound on 4/24/19- BIRADS 4  FINDINGS:   Mammography:  Additional right breast views demonstrate a persistent mass in the upper inner  right breast, middle depth, measuring 5 to 7 mm. There are no suspicious  pleomorphic calcifications. No skin thickening or nipple retraction.      Ultrasound:  Right breast ultrasound at the 2:00 position 5 cm from the nipple demonstrates a  hypoechoic antiparallel mass with angular margins measuring 6 x 7 x 6 mm.     IMPRESSION: Right breast ultrasound finding of a suspicious mass correlate with  mammographic finding.     Recommendation:  Ultrasound guided biopsy of a suspicious right breast mass.     BI-RADS Assessment Category 4: Suspicious abnormality- Biopsy should be  considered.       Past Medical History:   Diagnosis Date    Anemia     resolved after tx     H.  PYLORI INFECTION     tx in  2008 no stool Ag to confirm tx    Hypercholesterolemia     Hypertension        Past Surgical History:   Procedure Laterality Date    HX APPENDECTOMY         Social History     Socioeconomic History    Marital status:      Spouse name: Not on file    Number of children: Not on file    Years of education: Not on file    Highest education level: Not on file   Occupational History    Not on file   Social Needs    Financial resource strain: Not on file    Food insecurity:     Worry: Not on file     Inability: Not on file    Transportation needs:     Medical: Not on file     Non-medical: Not on file   Tobacco Use    Smoking status: Never Smoker    Smokeless tobacco: Never Used   Substance and Sexual Activity    Alcohol use: No     Alcohol/week: 0.0 oz    Drug use: No    Sexual activity: Never   Lifestyle    Physical activity:     Days per week: Not on file     Minutes per session: Not on file    Stress: Not on file   Relationships    Social connections:     Talks on phone: Not on file     Gets together: Not on file     Attends Caodaism service: Not on file     Active member of club or organization: Not on file     Attends meetings of clubs or organizations: Not on file     Relationship status: Not on file    Intimate partner violence:     Fear of current or ex partner: Not on file     Emotionally abused: Not on file     Physically abused: Not on file     Forced sexual activity: Not on file   Other Topics Concern    Not on file   Social History Narrative    ** Merged History Encounter **            Current Outpatient Medications on File Prior to Visit   Medication Sig Dispense Refill    simvastatin (ZOCOR) 20 mg tablet TAKE ONE TABLET BY MOUTH NIGHTLY 90 Tab 1    levothyroxine (SYNTHROID) 50 mcg tablet TAKE 1 TABLET BY MOUTH BEFORE BREAKFAST 90 Tab 1    cholecalciferol, vitamin D3, (VITAMIN D3) 2,000 unit tab Take  by mouth. No current facility-administered medications on file prior to visit. No Known Allergies    OB History    None         ROS  Constitutional: Negative. HENT: Negative. Eyes: Negative. Respiratory: Negative. Cardiovascular: Negative. Gastrointestinal: Negative. Genitourinary: Negative. Musculoskeletal: Negative. Skin: Negative. Neurological: Negative. Endo/Heme/Allergies: Negative. Psychiatric/Behavioral: Negative. Physical Exam   Cardiovascular: Normal rate, regular rhythm and normal heart sounds.    Pulmonary/Chest: Breath sounds normal. Right breast exhibits mass. Right breast exhibits no inverted nipple, no nipple discharge, no skin change and no tenderness. Left breast exhibits no inverted nipple, no mass, no nipple discharge, no skin change and no tenderness. Breasts are symmetrical.       Lymphadenopathy:     She has no cervical adenopathy. Right cervical: No superficial cervical, no deep cervical and no posterior cervical adenopathy present. Left cervical: No superficial cervical, no deep cervical and no posterior cervical adenopathy present. She has no axillary adenopathy. Right axillary: No pectoral and no lateral adenopathy present. Left axillary: No pectoral and no lateral adenopathy present. BREAST ULTRASOUND, Preop planning  Indication: Pre-op planning right Breast 2 o'clock   Technique: The area was scanned using a high-frequency linear-array near-field transducer  Findings: Mass, bx clip  Impression: Breast cancer  Disposition:  Surgery    ASSESSMENT and PLAN    ICD-10-CM ICD-9-CM    1. Ductal carcinoma in situ (DCIS) of right breast D05.11 233.0       Pt presents for for consultation for treatment of RIGHT breast DCIS with mucocele-like lesion, ER+/MS+, clinical stage 0. Palpable mass on exam at RIGHT breast 2:00. US visualizes mass and bx clip. We had a long discussion of options for treatment. A total of 60 minutes were spent face-to-face with the patient, accompanied by her son, during this encounter, and over half of that time was spent on counseling and coordination of care. We discussed in depth the pathology results, and the need for surgery following MRI for extent of disease and surgical planning. The goals of treatment are to treat the breast, ensure that there is no invasive component, and to reduce risk of recurrence of this cancer.     Discussed treatment options with risks, complications, benefits, and limitations, including lumpectomy with possible XRT, and mastectomy with reconstruction, both having the same cure rate. Discussed lumpectomy. Reviewed the importance of negative margins, and that if margins are positive for cancer cells, we will re-excise in a second surgery. Reviewed that invasive cancer is sometimes found in conjunction with mucocele. Because of mass, will plan for SLNBx of 3-4 LNs during initial surgery; discussed benefit and technique of this procedure. Lumpectomy and SLNBx is an outpatient operation, with expected 1-2 week recovery time. Will send for DCISionRT to help determine need for XRT. If invasive cancer is found, will likely plan for XRT and hormonal therapy with estrogen blocker. Pt understands and consents to surgery. Will schedule for the near future, and scheduling will f/u with the date. This plan was reviewed with the patient and patient agrees. All questions were answered.     Written by Michael Capone, as dictated by Dr. Michelle Dempsey MD.

## 2019-07-16 DIAGNOSIS — D05.11 DUCTAL CARCINOMA IN SITU OF RIGHT BREAST: Primary | ICD-10-CM

## 2019-07-17 ENCOUNTER — HOSPITAL ENCOUNTER (OUTPATIENT)
Dept: PREADMISSION TESTING | Age: 54
Discharge: HOME | End: 2019-07-17
Payer: COMMERCIAL

## 2019-07-17 VITALS
SYSTOLIC BLOOD PRESSURE: 117 MMHG | HEART RATE: 73 BPM | TEMPERATURE: 97.8 F | DIASTOLIC BLOOD PRESSURE: 75 MMHG | WEIGHT: 241 LBS | BODY MASS INDEX: 32.64 KG/M2 | RESPIRATION RATE: 12 BRPM | HEIGHT: 72 IN

## 2019-07-17 LAB
BASOPHILS # BLD: 0 K/UL (ref 0–0.1)
BASOPHILS NFR BLD: 1 % (ref 0–1)
DIFFERENTIAL METHOD BLD: NORMAL
EOSINOPHIL # BLD: 0.4 K/UL (ref 0–0.4)
EOSINOPHIL NFR BLD: 7 % (ref 0–7)
ERYTHROCYTE [DISTWIDTH] IN BLOOD BY AUTOMATED COUNT: 12.5 % (ref 11.5–14.5)
HCT VFR BLD AUTO: 38.6 % (ref 35–47)
HGB BLD-MCNC: 12.5 G/DL (ref 11.5–16)
IMM GRANULOCYTES # BLD AUTO: 0 K/UL (ref 0–0.04)
IMM GRANULOCYTES NFR BLD AUTO: 0 % (ref 0–0.5)
LYMPHOCYTES # BLD: 1.8 K/UL (ref 0.8–3.5)
LYMPHOCYTES NFR BLD: 36 % (ref 12–49)
MCH RBC QN AUTO: 31.2 PG (ref 26–34)
MCHC RBC AUTO-ENTMCNC: 32.4 G/DL (ref 30–36.5)
MCV RBC AUTO: 96.3 FL (ref 80–99)
MONOCYTES # BLD: 0.5 K/UL (ref 0–1)
MONOCYTES NFR BLD: 10 % (ref 5–13)
NEUTS SEG # BLD: 2.4 K/UL (ref 1.8–8)
NEUTS SEG NFR BLD: 46 % (ref 32–75)
NRBC # BLD: 0 K/UL (ref 0–0.01)
NRBC BLD-RTO: 0 PER 100 WBC
PLATELET # BLD AUTO: 207 K/UL (ref 150–400)
PMV BLD AUTO: 10.2 FL (ref 8.9–12.9)
RBC # BLD AUTO: 4.01 M/UL (ref 3.8–5.2)
WBC # BLD AUTO: 5.1 K/UL (ref 3.6–11)

## 2019-07-17 PROCEDURE — 36415 COLL VENOUS BLD VENIPUNCTURE: CPT

## 2019-07-17 PROCEDURE — 85025 COMPLETE CBC W/AUTO DIFF WBC: CPT

## 2019-07-17 NOTE — PERIOP NOTES
Preoperative instructions reviewed with patient. Patient given SSI infection FAQS sheet. Given two bottles of skin prep chlorhexidine soap; given written and verbal instructions on use. Patient was given the opportunity to ask questions on the information provided. Surgical consent obtained and signed by patient.

## 2019-08-01 ENCOUNTER — HOSPITAL ENCOUNTER (OUTPATIENT)
Age: 54
Setting detail: OUTPATIENT SURGERY
Discharge: HOME | End: 2019-08-01
Attending: SURGERY | Admitting: SURGERY
Payer: COMMERCIAL

## 2019-08-01 ENCOUNTER — ANESTHESIA EVENT (OUTPATIENT)
Dept: MEDSURG UNIT | Age: 54
End: 2019-08-01
Payer: COMMERCIAL

## 2019-08-01 ENCOUNTER — APPOINTMENT (OUTPATIENT)
Dept: NUCLEAR MEDICINE | Age: 54
End: 2019-08-01
Attending: SURGERY
Payer: COMMERCIAL

## 2019-08-01 ENCOUNTER — ANESTHESIA (OUTPATIENT)
Dept: MEDSURG UNIT | Age: 54
End: 2019-08-01
Payer: COMMERCIAL

## 2019-08-01 VITALS
OXYGEN SATURATION: 95 % | SYSTOLIC BLOOD PRESSURE: 123 MMHG | BODY MASS INDEX: 28.98 KG/M2 | TEMPERATURE: 97.8 F | RESPIRATION RATE: 12 BRPM | WEIGHT: 238.1 LBS | HEART RATE: 81 BPM | DIASTOLIC BLOOD PRESSURE: 69 MMHG

## 2019-08-01 DIAGNOSIS — C50.919 BREAST CANCER (HCC): ICD-10-CM

## 2019-08-01 DIAGNOSIS — D05.11 DUCTAL CARCINOMA IN SITU OF RIGHT BREAST: ICD-10-CM

## 2019-08-01 PROCEDURE — 74011000250 HC RX REV CODE- 250: Performed by: SURGERY

## 2019-08-01 PROCEDURE — 77030031139 HC SUT VCRL2 J&J -A: Performed by: SURGERY

## 2019-08-01 PROCEDURE — 88307 TISSUE EXAM BY PATHOLOGIST: CPT

## 2019-08-01 PROCEDURE — 74011250636 HC RX REV CODE- 250/636: Performed by: SURGERY

## 2019-08-01 PROCEDURE — 74011250636 HC RX REV CODE- 250/636: Performed by: NURSE ANESTHETIST, CERTIFIED REGISTERED

## 2019-08-01 PROCEDURE — 77030040361 HC SLV COMPR DVT MDII -B: Performed by: SURGERY

## 2019-08-01 PROCEDURE — 76030000003 HC AMB SURG OR TIME 1.5 TO 2: Performed by: SURGERY

## 2019-08-01 PROCEDURE — 77030011267 HC ELECTRD BLD COVD -A: Performed by: SURGERY

## 2019-08-01 PROCEDURE — 74011250637 HC RX REV CODE- 250/637: Performed by: ANESTHESIOLOGY

## 2019-08-01 PROCEDURE — 77030034626 HC LIGASURE SM JAW SEAL OPN SURG COVD -E: Performed by: SURGERY

## 2019-08-01 PROCEDURE — 88342 IMHCHEM/IMCYTCHM 1ST ANTB: CPT

## 2019-08-01 PROCEDURE — 76060000063 HC AMB SURG ANES 1.5 TO 2 HR: Performed by: SURGERY

## 2019-08-01 PROCEDURE — 77030011640 HC PAD GRND REM COVD -A: Performed by: SURGERY

## 2019-08-01 PROCEDURE — 77030002933 HC SUT MCRYL J&J -A: Performed by: SURGERY

## 2019-08-01 PROCEDURE — A9520 TC99 TILMANOCEPT DIAG 0.5MCI: HCPCS

## 2019-08-01 PROCEDURE — 76210000046 HC AMBSU PH II REC FIRST 0.5 HR: Performed by: SURGERY

## 2019-08-01 PROCEDURE — 76210000035 HC AMBSU PH I REC 1 TO 1.5 HR: Performed by: SURGERY

## 2019-08-01 PROCEDURE — 77030010507 HC ADH SKN DERMBND J&J -B: Performed by: SURGERY

## 2019-08-01 PROCEDURE — 77030018836 HC SOL IRR NACL ICUM -A: Performed by: SURGERY

## 2019-08-01 PROCEDURE — 77030020782 HC GWN BAIR PAWS FLX 3M -B

## 2019-08-01 PROCEDURE — 88360 TUMOR IMMUNOHISTOCHEM/MANUAL: CPT

## 2019-08-01 PROCEDURE — 77030010509 HC AIRWY LMA MSK TELE -A: Performed by: ANESTHESIOLOGY

## 2019-08-01 PROCEDURE — 74011250636 HC RX REV CODE- 250/636

## 2019-08-01 PROCEDURE — 77030002996 HC SUT SLK J&J -A: Performed by: SURGERY

## 2019-08-01 RX ORDER — ONDANSETRON 2 MG/ML
4 INJECTION INTRAMUSCULAR; INTRAVENOUS AS NEEDED
Status: DISCONTINUED | OUTPATIENT
Start: 2019-08-01 | End: 2019-08-01 | Stop reason: HOSPADM

## 2019-08-01 RX ORDER — MORPHINE SULFATE 10 MG/ML
2 INJECTION, SOLUTION INTRAMUSCULAR; INTRAVENOUS
Status: DISCONTINUED | OUTPATIENT
Start: 2019-08-01 | End: 2019-08-01 | Stop reason: HOSPADM

## 2019-08-01 RX ORDER — MIDAZOLAM HYDROCHLORIDE 1 MG/ML
0.5 INJECTION, SOLUTION INTRAMUSCULAR; INTRAVENOUS
Status: DISCONTINUED | OUTPATIENT
Start: 2019-08-01 | End: 2019-08-01 | Stop reason: HOSPADM

## 2019-08-01 RX ORDER — PROPOFOL 10 MG/ML
INJECTION, EMULSION INTRAVENOUS AS NEEDED
Status: DISCONTINUED | OUTPATIENT
Start: 2019-08-01 | End: 2019-08-01 | Stop reason: HOSPADM

## 2019-08-01 RX ORDER — HYDROMORPHONE HYDROCHLORIDE 1 MG/ML
0.5 INJECTION, SOLUTION INTRAMUSCULAR; INTRAVENOUS; SUBCUTANEOUS
Status: DISCONTINUED | OUTPATIENT
Start: 2019-08-01 | End: 2019-08-01 | Stop reason: HOSPADM

## 2019-08-01 RX ORDER — SODIUM CHLORIDE 0.9 % (FLUSH) 0.9 %
5-40 SYRINGE (ML) INJECTION EVERY 8 HOURS
Status: DISCONTINUED | OUTPATIENT
Start: 2019-08-01 | End: 2019-08-01 | Stop reason: HOSPADM

## 2019-08-01 RX ORDER — ACETAMINOPHEN 325 MG/1
650 TABLET ORAL ONCE
Status: COMPLETED | OUTPATIENT
Start: 2019-08-01 | End: 2019-08-01

## 2019-08-01 RX ORDER — FENTANYL CITRATE 50 UG/ML
INJECTION, SOLUTION INTRAMUSCULAR; INTRAVENOUS AS NEEDED
Status: DISCONTINUED | OUTPATIENT
Start: 2019-08-01 | End: 2019-08-01 | Stop reason: HOSPADM

## 2019-08-01 RX ORDER — FENTANYL CITRATE 50 UG/ML
25 INJECTION, SOLUTION INTRAMUSCULAR; INTRAVENOUS
Status: DISCONTINUED | OUTPATIENT
Start: 2019-08-01 | End: 2019-08-01 | Stop reason: HOSPADM

## 2019-08-01 RX ORDER — MIDAZOLAM HYDROCHLORIDE 1 MG/ML
1 INJECTION, SOLUTION INTRAMUSCULAR; INTRAVENOUS AS NEEDED
Status: DISCONTINUED | OUTPATIENT
Start: 2019-08-01 | End: 2019-08-01 | Stop reason: HOSPADM

## 2019-08-01 RX ORDER — SODIUM CHLORIDE 0.9 % (FLUSH) 0.9 %
5-40 SYRINGE (ML) INJECTION AS NEEDED
Status: DISCONTINUED | OUTPATIENT
Start: 2019-08-01 | End: 2019-08-01 | Stop reason: HOSPADM

## 2019-08-01 RX ORDER — SODIUM CHLORIDE, SODIUM LACTATE, POTASSIUM CHLORIDE, CALCIUM CHLORIDE 600; 310; 30; 20 MG/100ML; MG/100ML; MG/100ML; MG/100ML
100 INJECTION, SOLUTION INTRAVENOUS CONTINUOUS
Status: DISCONTINUED | OUTPATIENT
Start: 2019-08-01 | End: 2019-08-01 | Stop reason: HOSPADM

## 2019-08-01 RX ORDER — MIDAZOLAM HYDROCHLORIDE 1 MG/ML
INJECTION, SOLUTION INTRAMUSCULAR; INTRAVENOUS AS NEEDED
Status: DISCONTINUED | OUTPATIENT
Start: 2019-08-01 | End: 2019-08-01 | Stop reason: HOSPADM

## 2019-08-01 RX ORDER — BUPIVACAINE HYDROCHLORIDE AND EPINEPHRINE 5; 5 MG/ML; UG/ML
30 INJECTION, SOLUTION EPIDURAL; INTRACAUDAL; PERINEURAL ONCE
Status: COMPLETED | OUTPATIENT
Start: 2019-08-01 | End: 2019-08-01

## 2019-08-01 RX ORDER — LIDOCAINE HYDROCHLORIDE 10 MG/ML
0.1 INJECTION, SOLUTION EPIDURAL; INFILTRATION; INTRACAUDAL; PERINEURAL AS NEEDED
Status: DISCONTINUED | OUTPATIENT
Start: 2019-08-01 | End: 2019-08-01 | Stop reason: HOSPADM

## 2019-08-01 RX ORDER — ONDANSETRON 2 MG/ML
INJECTION INTRAMUSCULAR; INTRAVENOUS AS NEEDED
Status: DISCONTINUED | OUTPATIENT
Start: 2019-08-01 | End: 2019-08-01 | Stop reason: HOSPADM

## 2019-08-01 RX ORDER — DIPHENHYDRAMINE HYDROCHLORIDE 50 MG/ML
12.5 INJECTION, SOLUTION INTRAMUSCULAR; INTRAVENOUS AS NEEDED
Status: DISCONTINUED | OUTPATIENT
Start: 2019-08-01 | End: 2019-08-01 | Stop reason: HOSPADM

## 2019-08-01 RX ORDER — PHENYLEPHRINE HCL IN 0.9% NACL 0.4MG/10ML
SYRINGE (ML) INTRAVENOUS AS NEEDED
Status: DISCONTINUED | OUTPATIENT
Start: 2019-08-01 | End: 2019-08-01 | Stop reason: HOSPADM

## 2019-08-01 RX ORDER — HYDROCODONE BITARTRATE AND ACETAMINOPHEN 7.5; 325 MG/1; MG/1
1 TABLET ORAL
Qty: 25 TAB | Refills: 0 | Status: SHIPPED | OUTPATIENT
Start: 2019-08-01 | End: 2019-08-08

## 2019-08-01 RX ORDER — DEXAMETHASONE SODIUM PHOSPHATE 4 MG/ML
INJECTION, SOLUTION INTRA-ARTICULAR; INTRALESIONAL; INTRAMUSCULAR; INTRAVENOUS; SOFT TISSUE AS NEEDED
Status: DISCONTINUED | OUTPATIENT
Start: 2019-08-01 | End: 2019-08-01 | Stop reason: HOSPADM

## 2019-08-01 RX ORDER — LIDOCAINE HYDROCHLORIDE 20 MG/ML
INJECTION, SOLUTION EPIDURAL; INFILTRATION; INTRACAUDAL; PERINEURAL AS NEEDED
Status: DISCONTINUED | OUTPATIENT
Start: 2019-08-01 | End: 2019-08-01 | Stop reason: HOSPADM

## 2019-08-01 RX ORDER — LIDOCAINE HYDROCHLORIDE AND EPINEPHRINE 10; 10 MG/ML; UG/ML
30 INJECTION, SOLUTION INFILTRATION; PERINEURAL ONCE
Status: COMPLETED | OUTPATIENT
Start: 2019-08-01 | End: 2019-08-01

## 2019-08-01 RX ORDER — SODIUM CHLORIDE, SODIUM LACTATE, POTASSIUM CHLORIDE, CALCIUM CHLORIDE 600; 310; 30; 20 MG/100ML; MG/100ML; MG/100ML; MG/100ML
INJECTION, SOLUTION INTRAVENOUS
Status: DISCONTINUED | OUTPATIENT
Start: 2019-08-01 | End: 2019-08-01 | Stop reason: HOSPADM

## 2019-08-01 RX ORDER — ROPIVACAINE HYDROCHLORIDE 5 MG/ML
30 INJECTION, SOLUTION EPIDURAL; INFILTRATION; PERINEURAL AS NEEDED
Status: DISCONTINUED | OUTPATIENT
Start: 2019-08-01 | End: 2019-08-01 | Stop reason: HOSPADM

## 2019-08-01 RX ORDER — SODIUM CHLORIDE 9 MG/ML
25 INJECTION, SOLUTION INTRAVENOUS CONTINUOUS
Status: DISCONTINUED | OUTPATIENT
Start: 2019-08-01 | End: 2019-08-01 | Stop reason: HOSPADM

## 2019-08-01 RX ORDER — CEFAZOLIN SODIUM/WATER 2 G/20 ML
2 SYRINGE (ML) INTRAVENOUS
Status: COMPLETED | OUTPATIENT
Start: 2019-08-01 | End: 2019-08-01

## 2019-08-01 RX ORDER — FENTANYL CITRATE 50 UG/ML
50 INJECTION, SOLUTION INTRAMUSCULAR; INTRAVENOUS AS NEEDED
Status: DISCONTINUED | OUTPATIENT
Start: 2019-08-01 | End: 2019-08-01 | Stop reason: HOSPADM

## 2019-08-01 RX ADMIN — FENTANYL CITRATE 25 MCG: 50 INJECTION, SOLUTION INTRAMUSCULAR; INTRAVENOUS at 15:23

## 2019-08-01 RX ADMIN — Medication 120 MCG: at 13:39

## 2019-08-01 RX ADMIN — LIDOCAINE HYDROCHLORIDE 100 MG: 20 INJECTION, SOLUTION EPIDURAL; INFILTRATION; INTRACAUDAL; PERINEURAL at 13:14

## 2019-08-01 RX ADMIN — PROPOFOL 100 MG: 10 INJECTION, EMULSION INTRAVENOUS at 14:04

## 2019-08-01 RX ADMIN — Medication 120 MCG: at 13:48

## 2019-08-01 RX ADMIN — DEXAMETHASONE SODIUM PHOSPHATE 4 MG: 4 INJECTION, SOLUTION INTRAMUSCULAR; INTRAVENOUS at 13:42

## 2019-08-01 RX ADMIN — ACETAMINOPHEN 650 MG: 325 TABLET ORAL at 12:53

## 2019-08-01 RX ADMIN — PROPOFOL 200 MG: 10 INJECTION, EMULSION INTRAVENOUS at 13:14

## 2019-08-01 RX ADMIN — SODIUM CHLORIDE, POTASSIUM CHLORIDE, SODIUM LACTATE AND CALCIUM CHLORIDE: 600; 310; 30; 20 INJECTION, SOLUTION INTRAVENOUS at 13:04

## 2019-08-01 RX ADMIN — Medication 2 G: at 13:19

## 2019-08-01 RX ADMIN — MIDAZOLAM 2 MG: 1 INJECTION INTRAMUSCULAR; INTRAVENOUS at 13:04

## 2019-08-01 RX ADMIN — FENTANYL CITRATE 100 MCG: 50 INJECTION, SOLUTION INTRAMUSCULAR; INTRAVENOUS at 13:14

## 2019-08-01 RX ADMIN — ONDANSETRON HYDROCHLORIDE 4 MG: 2 INJECTION, SOLUTION INTRAVENOUS at 13:42

## 2019-08-01 RX ADMIN — FENTANYL CITRATE 25 MCG: 50 INJECTION, SOLUTION INTRAMUSCULAR; INTRAVENOUS at 15:16

## 2019-08-01 NOTE — DISCHARGE INSTRUCTIONS
Discharge Instructions from Dr. Hernandez Basket    · I will call you with the pathology results, typically within 1 week from today. · You may shower, but no hot tubs, swimming pools, or baths until your incision is healed. · No heavy lifting with the affected extremity (nothing greater than 5 pounds), and limit its use for the next 4-5 days. · You may use an ice pack for comfort for the next couple of days, but do not place ice directly on the skin. Rather, use a towel or clothing to serve as a barrier between skin and ice to prevent injury. · If I placed a drain, follow the drain instructions provided, especially as you keep a record of the drain output. · Follow medication instructions carefully. · Watch for signs of infection as listed below. · Redness  · Swelling  · Drainage from the incision or from your nipple that appears infected  · Fever over 101 degrees for consecutive readings, or over 99.5 if you are currently undergoing chemotherapy. · Call our office (number is below) for a follow-up appointment. · If you have any problems, our phone number is [phone]. DISCHARGE SUMMARY from Nurse    Tylenol given at 1 pm, please do not take another dose of Tylenol until after 5 pm.     PATIENT INSTRUCTIONS:    After general anesthesia or intravenous sedation, for 24 hours or while taking prescription Narcotics:  · Limit your activities  · Do not drive and operate hazardous machinery  · Do not make important personal or business decisions  · Do  not drink alcoholic beverages  · If you have not urinated within 8 hours after discharge, please contact your surgeon on call.     Report the following to your surgeon:  · Excessive pain, swelling, redness or odor of or around the surgical area  · Temperature over 100.5  · Nausea and vomiting lasting longer than 4 hours or if unable to take medications  · Any signs of decreased circulation or nerve impairment to extremity: change in color, persistent  numbness, tingling, coldness or increase pain  · Any questions    What to do at Home:  Recommended activity: See surgical instructions. If you experience any of the following symptoms as noted above, please follow up with Dr. Jenna Sheffield. *  Please give a list of your current medications to your Primary Care Provider. *  Please update this list whenever your medications are discontinued, doses are      changed, or new medications (including over-the-counter products) are added. *  Please carry medication information at all times in case of emergency situations. These are general instructions for a healthy lifestyle:    No smoking/ No tobacco products/ Avoid exposure to second hand smoke  Surgeon General's Warning:  Quitting smoking now greatly reduces serious risk to your health. Obesity, smoking, and sedentary lifestyle greatly increases your risk for illness    A healthy diet, regular physical exercise & weight monitoring are important for maintaining a healthy lifestyle    You may be retaining fluid if you have a history of heart failure or if you experience any of the following symptoms:  Weight gain of 3 pounds or more overnight or 5 pounds in a week, increased swelling in our hands or feet or shortness of breath while lying flat in bed. Please call your doctor as soon as you notice any of these symptoms; do not wait until your next office visit. The discharge information has been reviewed with the caregiver. The caregiver verbalized understanding. Discharge medications reviewed with the caregiver and appropriate educational materials and side effects teaching were provided.   ___________________________________________________________________________________________________________________________________

## 2019-08-01 NOTE — H&P
HISTORY OF PRESENT ILLNESS  Ioana Rachel is a 62 y.o. female. HPI  NEW patient here today referred for consultation at the request of Dr. Juan Jose De Leon for newly diagnosed RIGHT breast cancer. Suspicion found on imaging, which led to RIGHT breast biopsy.   She is accompanied by her son.      06/26/19: RIGHT breast bx, 2:00. PATH: DCIS, mucocele-like lesion with atypical ductal hyperplasia and in situ carcinoma, ER+(100%)/VT+(100%). Clinical stage 0.     No FH of breast or ovarian cancer.      Recent imaging-   Bilateral screening mammogram on 4/8/19- BIRADS 0  RIGHT diagnostic mammogram and ultrasound on 4/24/19- BIRADS 4  FINDINGS:   Mammography:  Additional right breast views demonstrate a persistent mass in the upper inner  right breast, middle depth, measuring 5 to 7 mm. There are no suspicious  pleomorphic calcifications. No skin thickening or nipple retraction.      Ultrasound:  Right breast ultrasound at the 2:00 position 5 cm from the nipple demonstrates a  hypoechoic antiparallel mass with angular margins measuring 6 x 7 x 6 mm.     IMPRESSION: Right breast ultrasound finding of a suspicious mass correlate with  mammographic finding.     Recommendation:  Ultrasound guided biopsy of a suspicious right breast mass.     BI-RADS Assessment Category 4: Suspicious abnormality- Biopsy should be  considered.             Past Medical History:   Diagnosis Date    Anemia       resolved after tx     H.  PYLORI INFECTION       tx in  2008 no stool Ag to confirm tx    Hypercholesterolemia      Hypertension                 Past Surgical History:   Procedure Laterality Date    HX APPENDECTOMY             Social History            Socioeconomic History    Marital status:        Spouse name: Not on file    Number of children: Not on file    Years of education: Not on file    Highest education level: Not on file   Occupational History    Not on file   Social Needs    Financial resource strain: Not on file   Remy Carry Food insecurity:       Worry: Not on file       Inability: Not on file    Transportation needs:       Medical: Not on file       Non-medical: Not on file   Tobacco Use    Smoking status: Never Smoker    Smokeless tobacco: Never Used   Substance and Sexual Activity    Alcohol use:  No       Alcohol/week: 0.0 oz    Drug use: No    Sexual activity: Never   Lifestyle    Physical activity:       Days per week: Not on file       Minutes per session: Not on file    Stress: Not on file   Relationships    Social connections:       Talks on phone: Not on file       Gets together: Not on file       Attends [employer] service: Not on file       Active member of club or organization: Not on file       Attends meetings of clubs or organizations: Not on file       Relationship status: Not on file    Intimate partner violence:       Fear of current or ex partner: Not on file       Emotionally abused: Not on file       Physically abused: Not on file       Forced sexual activity: Not on file   Other Topics Concern    Not on file   Social History Narrative     ** Merged History Encounter **                      Current Outpatient Medications on File Prior to Visit   Medication Sig Dispense Refill    simvastatin (ZOCOR) 20 mg tablet TAKE ONE TABLET BY MOUTH NIGHTLY 90 Tab 1    levothyroxine (SYNTHROID) 50 mcg tablet TAKE 1 TABLET BY MOUTH BEFORE BREAKFAST 90 Tab 1    cholecalciferol, vitamin D3, (VITAMIN D3) 2,000 unit tab Take  by mouth.          No current facility-administered medications on file prior to visit.          No Known Allergies     OB History    None            ROS  Constitutional: Negative.    HENT: Negative.    Eyes: Negative.    Respiratory: Negative.    Cardiovascular: Negative.    Gastrointestinal: Negative.    Genitourinary: Negative.    Musculoskeletal: Negative.    Skin: Negative.    Neurological: Negative.    Endo/Heme/Allergies: Negative.    Psychiatric/Behavioral: Negative.         Physical Exam Cardiovascular: Normal rate, regular rhythm and normal heart sounds. Pulmonary/Chest: Breath sounds normal. Right breast exhibits mass. Right breast exhibits no inverted nipple, no nipple discharge, no skin change and no tenderness. Left breast exhibits no inverted nipple, no mass, no nipple discharge, no skin change and no tenderness. Breasts are symmetrical.       Lymphadenopathy:     She has no cervical adenopathy. Right cervical: No superficial cervical, no deep cervical and no posterior cervical adenopathy present. Left cervical: No superficial cervical, no deep cervical and no posterior cervical adenopathy present. She has no axillary adenopathy. Right axillary: No pectoral and no lateral adenopathy present. Left axillary: No pectoral and no lateral adenopathy present.        BREAST ULTRASOUND, Preop planning  Indication: Pre-op planning right Breast 2 o'clock   Technique: The area was scanned using a high-frequency linear-array near-field transducer  Findings: Mass, bx clip  Impression: Breast cancer  Disposition:  Surgery     ASSESSMENT and PLAN      ICD-10-CM ICD-9-CM     1. Ductal carcinoma in situ (DCIS) of right breast D05.11 233.0        Pt presents for for consultation for treatment of RIGHT breast DCIS with mucocele-like lesion, ER+/KS+, clinical stage 0. Palpable mass on exam at RIGHT breast 2:00. US visualizes mass and bx clip.     We had a long discussion of options for treatment. A total of 60 minutes were spent face-to-face with the patient, accompanied by her son, during this encounter, and over half of that time was spent on counseling and coordination of care. We discussed in depth the pathology results, and the need for surgery following MRI for extent of disease and surgical planning.  The goals of treatment are to treat the breast, ensure that there is no invasive component, and to reduce risk of recurrence of this cancer.     Discussed treatment options with risks, complications, benefits, and limitations, including lumpectomy with possible XRT, and mastectomy with reconstruction, both having the same cure rate. Discussed lumpectomy. Reviewed the importance of negative margins, and that if margins are positive for cancer cells, we will re-excise in a second surgery. Reviewed that invasive cancer is sometimes found in conjunction with mucocele. Because of mass, will plan for SLNBx of 3-4 LNs during initial surgery; discussed benefit and technique of this procedure. Lumpectomy and SLNBx is an outpatient operation, with expected 1-2 week recovery time.     Will send for DCISionRT to help determine need for XRT. If invasive cancer is found, will likely plan for XRT and hormonal therapy with estrogen blocker.     Pt understands and consents to surgery. Will schedule for the near future, and scheduling will f/u with the date. This plan was reviewed with the patient and patient agrees. All questions were answered.

## 2019-08-01 NOTE — ANESTHESIA PREPROCEDURE EVALUATION
Relevant Problems   No relevant active problems       Anesthetic History   No history of anesthetic complications            Review of Systems / Medical History  Patient summary reviewed, nursing notes reviewed and pertinent labs reviewed    Pulmonary  Within defined limits                 Neuro/Psych   Within defined limits           Cardiovascular  Within defined limits                Exercise tolerance: >4 METS     GI/Hepatic/Renal  Within defined limits              Endo/Other  Within defined limits    Hypothyroidism  Morbid obesity and cancer     Other Findings              Physical Exam    Airway  Mallampati: II  TM Distance: 4 - 6 cm  Neck ROM: normal range of motion   Mouth opening: Normal     Cardiovascular  Regular rate and rhythm,  S1 and S2 normal,  no murmur, click, rub, or gallop             Dental  No notable dental hx       Pulmonary  Breath sounds clear to auscultation               Abdominal  GI exam deferred       Other Findings            Anesthetic Plan    ASA: 2  Anesthesia type: general          Induction: Intravenous  Anesthetic plan and risks discussed with: Patient

## 2019-08-01 NOTE — ROUTINE PROCESS
Patient: Governor Garcia MRN: [id number]  SSN: [id number]   YOB: 1965  Age: 62 y.o. Sex: female     Patient is status post Procedure(s):  RIGHT BREAST LUMPECTOMY WITH ULTRASOUND, RIGHT BREAST SENTINEL NODE BIOPSY  SENTINEL NODE BIOPSY.     Surgeon(s) and Role:     * Trevor Alvarado MD - Primary    Local/Dose/Irrigation:  SEE MAR                  Peripheral IV 08/01/19 Left Hand (Active)   Site Assessment Clean, dry, & intact 8/1/2019 12:56 PM   Phlebitis Assessment 0 8/1/2019 12:56 PM   Dressing Status Clean, dry, & intact 8/1/2019 12:56 PM   Dressing Type Transparent 8/1/2019 12:56 PM   Hub Color/Line Status Infusing;Blue 8/1/2019 12:56 PM            Airway - Endotracheal Tube 08/01/19 (Active)                   Dressing/Packing:  Wound Breast Right-Dressing Type: Topical skin adhesive/glue (08/01/19 1340)    Splint/Cast:  ]    Other:

## 2019-08-01 NOTE — BRIEF OP NOTE
BRIEF OPERATIVE NOTE    Date of Procedure: 8/1/2019   Preoperative Diagnosis: RIGHT BREAST DUCTAL CARCINOMA INSITU  Postoperative Diagnosis:  RIGHT BREAST DUCTAL CARCINOMA INSITU    Procedure(s):  RIGHT BREAST LUMPECTOMY WITH ULTRASOUND, RIGHT BREAST SENTINEL NODE BIOPSY  SENTINEL NODE BIOPSY  Surgeon(s) and Role:     * Kallie Gabriel MD - Primary         Surgical Assistant:  Valentina Locke    Surgical Staff:  Circ-1: Edward Estrada RN; Ata Cartagena, FAISAL  Registered Nurse Assistant: Brian Mallory RN  Scrub Tech-1: Nikolas Alexander  Event Time In Time Out   Incision Start 1340    Incision Close       Anesthesia: General   Estimated Blood Loss: minimal  Specimens:   ID Type Source Tests Collected by Time Destination   1 : RIGHT AXILLARY SENTINEL NODE #1 Fresh Axillary  Kallie Gabriel MD 8/1/2019 1341 Pathology   2 : RIGHT BREAST LUMPECTOMY Fresh Breast  Kallie Gabriel MD 8/1/2019 1351 Pathology      Findings: sent node x 1, spec sono pos clip, tissue adv, 24 sq cm   Complications: none  Implants: * No implants in log *

## 2019-08-01 NOTE — ANESTHESIA POSTPROCEDURE EVALUATION
Procedure(s):  RIGHT BREAST LUMPECTOMY WITH ULTRASOUND, RIGHT BREAST SENTINEL NODE BIOPSY  SENTINEL NODE BIOPSY. general    Anesthesia Post Evaluation        Patient location during evaluation: PACU  Note status: Adequate. Level of consciousness: responsive to verbal stimuli and sleepy but conscious  Pain management: satisfactory to patient  Airway patency: patent  Anesthetic complications: no  Cardiovascular status: acceptable  Respiratory status: acceptable  Hydration status: acceptable  Comments: +Post-Anesthesia Evaluation and Assessment    Patient: Aspen Kirkland MRN: [id number]  SSN: [id number]   YOB: 1965  Age: 62 y.o. Sex: female          Cardiovascular Function/Vital Signs    BP 90/75   Pulse 77   Temp 36.6 °C (97.8 °F)   Resp 9   Wt 108 kg (238 lb 1.6 oz)   SpO2 98%   BMI 28.98 kg/m²     Patient is status post Procedure(s):  RIGHT BREAST LUMPECTOMY WITH ULTRASOUND, RIGHT BREAST SENTINEL NODE BIOPSY  SENTINEL NODE BIOPSY. Nausea/Vomiting: Controlled. Postoperative hydration reviewed and adequate. Pain:  Pain Scale 1: Numeric (0 - 10) (08/01/19 1219)  Pain Intensity 1: 0 (08/01/19 1219)   Managed. Neurological Status:   Neuro (WDL): Within Defined Limits (08/01/19 1223)   At baseline. Mental Status and Level of Consciousness: Arousable. Pulmonary Status:   O2 Device: Nasal cannula (08/01/19 1445)   Adequate oxygenation and airway patent. Complications related to anesthesia: None    Post-anesthesia assessment completed. No concerns. I have evaluated the patient and the patient is stable and ready to be discharged from PACU . Signed By: Rosalio Osorio MD    8/1/2019        Vitals Value Taken Time   BP 90/75 8/1/2019  2:45 PM   Temp 36.6 °C (97.8 °F) 8/1/2019  2:45 PM   Pulse 74 8/1/2019  2:50 PM   Resp 13 8/1/2019  2:50 PM   SpO2 95 % 8/1/2019  2:50 PM   Vitals shown include unvalidated device data.

## 2019-08-02 NOTE — OP NOTES
[facility]  OPERATIVE REPORT    Name:  Rex Hallman  MR#:  [id number]  :  1965  ACCOUNT #:  [de-identified]  DATE OF SERVICE:  2019    PREOPERATIVE DIAGNOSIS:  Ductal carcinoma in situ of the right breast.    POSTOPERATIVE DIAGNOSIS:  Ductal carcinoma in situ of the right breast.    PROCEDURES PERFORMED:  Right lumpectomy with intraoperative ultrasound and right sentinel node biopsy. SURGEON:  Patricia Cantu MD    ASSISTANT:  Tammi Reed. ANESTHESIA:  General.    COMPLICATIONS:  None. SPECIMENS REMOVED:  Right axillary sentinel lymph node x1, right breast mass. IMPLANTS:  None. ESTIMATED BLOOD LOSS:  Minimal.    INDICATIONS:  The patient is a 25-year-old female with core biopsy of a right breast mass, which revealed ductal carcinoma in situ and a mucocele type lesion. Given the fact that the DCIS has formed a mass, a decision was made to perform a sentinel lymph node biopsy. PROCEDURE:  After lymphoscintigraphy in Radiology, the patient was brought to the operating room. After the satisfactory induction of general LMA anesthesia, the patient was prepped and draped in sterile fashion. An axillary incision was made and deepened through the subcutaneous tissue with Bovie cautery. Axilla was entered and a single sentinel node was identified. It was removed and sent for permanent section. All dissection planes were hemostatic. The wound was anesthetized with 0.5% Marcaine and closed with an interrupted 3-0 Vicryl and a running subcuticular 4-0 Monocryl on the skin. Attention was then turned to the breast where an intraoperative ultrasound was performed and the breast was marked. A circumareolar incision was made in the upper inner quadrant of the breast and deepened through the subcutaneous tissue with Bovie cautery. A small skin flap was raised superomedially over the area of the tumor and a standard lumpectomy was then performed down the chest wall. Specimen was oriented and specimen ultrasound was performed, which revealed a presence of the mass and the clip within the specimen. Tissue advancement flaps were then created superiorly and inferiorly over a distance of 4 x 3 cm respectively for a total tissue advancement of 24 sq cm. The parenchymal flaps were closed with interrupted 3-0 Vicryl suture. The deep subcutaneous tissue was reapproximated with interrupted 3-0 Vicryl. Skin was then released in the superolateral aspect where a small divot had been created. This was released easily with a Bovie cautery, advancing more tissue. The subcutaneous tissue was reapproximated with interrupted 3-0 Vicryl and skin closed with a running subcuticular 4-0 Monocryl after infiltration with 0.5% Marcaine. The patient tolerated the procedure well. There were no immediate complications. All specimens were oriented and sent to Pathology, and she was taken to the recovery room in stable condition.       Marcela Villegas MD      [id number]  D:  08/01/2019 14:26  T:  08/01/2019 16:31  JOB #:  [id number]  CC:  Luisana Kathleen MD

## 2019-08-06 ENCOUNTER — TELEPHONE (OUTPATIENT)
Dept: SURGERY | Age: 54
End: 2019-08-06

## 2019-08-06 NOTE — LETTER
NOTIFICATION RETURN TO WORK     8/7/2019 3:13 PM    Ms. Allie Ramires Arop  [address]  [address] [place] [address]      To Whom It May Concern:    Toby Jackson is currently under the care of [address]. She had surgery on August 1, 2019. She will be able to return to full duty work on August 19, 2019. If there are questions or concerns, please have the patient contact our office.         Sincerely,            Hayden Vides MD

## 2019-08-06 NOTE — TELEPHONE ENCOUNTER
Returned daughter's call. Patient needs note to be out of work a little longer. Daughter was not available, so I L/M for her to call back tomorrow with the information. 8/7/19:  I spoke to daughter today. I offered to write her mom a note to be out of work until 8/19/19 returning full duty then. I will e-mail this to her at [email or web address]. She was very appreciative.

## 2019-08-09 DIAGNOSIS — D05.11 INTRADUCTAL CARCINOMA IN SITU OF RIGHT BREAST: Primary | ICD-10-CM

## 2019-08-13 ENCOUNTER — TELEPHONE (OUTPATIENT)
Dept: SURGERY | Age: 54
End: 2019-08-13

## 2019-08-13 NOTE — TELEPHONE ENCOUNTER
Returned daughter's call (Wendy Desir), but she was not available. I confirmed her mom's appointments for tomorrow with medical oncology and the 29th with radiation oncology. I explained in my message what these appointments were for. I left my name and number for her to call me back if she had further questions.

## 2019-09-04 ENCOUNTER — OFFICE VISIT (OUTPATIENT)
Dept: SURGERY | Age: 54
End: 2019-09-04

## 2019-09-04 VITALS
DIASTOLIC BLOOD PRESSURE: 69 MMHG | SYSTOLIC BLOOD PRESSURE: 141 MMHG | BODY MASS INDEX: 32.23 KG/M2 | WEIGHT: 238 LBS | HEIGHT: 72 IN | HEART RATE: 67 BPM

## 2019-09-04 DIAGNOSIS — C50.911 STAGE 1 BREAST CANCER, ER+, RIGHT (HCC): Primary | ICD-10-CM

## 2019-09-04 DIAGNOSIS — D05.11 DUCTAL CARCINOMA IN SITU (DCIS) OF RIGHT BREAST: ICD-10-CM

## 2019-09-04 DIAGNOSIS — Z17.0 STAGE 1 BREAST CANCER, ER+, RIGHT (HCC): Primary | ICD-10-CM

## 2019-09-04 NOTE — PROGRESS NOTES
HISTORY OF PRESENT ILLNESS  Hipolito Jones is a 62 y.o. female. HPI  ESTABLISHED patient here for surgical follow-up. She is S/P RIGHT lumpectomy and SNBX about a month ago. Incisions are well healed, and she has no pain in the breast.  Only complaint is of some pain in the RIGHT axilla and under the rib in her back. Has not seen medical oncology or radiation oncology yet. Cancelled both appointments while she thinks about what she wants to do in the way of treatment.       06/26/19: RIGHT breast bx, 2:00. PATH: DCIS, mucocele-like lesion with atypical ductal hyperplasia and in situ carcinoma, ER+(100%)/AZ+(100%). Clinical stage 0.    08/01/19: RIGHT lumpectomy and SLNBx. 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 DCIS, ER+(100%)/AZ+(95%), negative margins. Pathologic stage: pT1b, pN0. Past Medical History:   Diagnosis Date    Anemia     resolved after tx     Cancer ([facility] [address] 2019    RIGHT     H.  PYLORI INFECTION     tx in  2008 no stool Ag to confirm tx    Hypercholesterolemia     Ill-defined condition     HIGH CHOLESTEROL - NO LONGER       Past Surgical History:   Procedure Laterality Date    HX APPENDECTOMY  1980    HX BREAST LUMPECTOMY Right 8/1/2019    RIGHT BREAST LUMPECTOMY WITH ULTRASOUND, RIGHT BREAST SENTINEL NODE BIOPSY performed by Marlene Fregoso MD at [facility] AMBULATORY OR       Social History     Socioeconomic History    Marital status:      Spouse name: Not on file    Number of children: Not on file    Years of education: Not on file    Highest education level: Not on file   Occupational History    Not on file   Social Needs    Financial resource strain: Not on file    Food insecurity:     Worry: Not on file     Inability: Not on file    Transportation needs:     Medical: Not on file     Non-medical: Not on file   Tobacco Use    Smoking status: Never Smoker    Smokeless tobacco: Never Used Substance and Sexual Activity    Alcohol use: No     Alcohol/week: 0.0 standard drinks    Drug use: No    Sexual activity: Never   Lifestyle    Physical activity:     Days per week: Not on file     Minutes per session: Not on file    Stress: Not on file   Relationships    Social connections:     Talks on phone: Not on file     Gets together: Not on file     Attends Worship service: Not on file     Active member of club or organization: Not on file     Attends meetings of clubs or organizations: Not on file     Relationship status: Not on file    Intimate partner violence:     Fear of current or ex partner: Not on file     Emotionally abused: Not on file     Physically abused: Not on file     Forced sexual activity: Not on file   Other Topics Concern    Not on file   Social History Narrative    ** Merged History Encounter **            Current Outpatient Medications on File Prior to Visit   Medication Sig Dispense Refill    cholecalciferol, vitamin D3, (VITAMIN D3) 2,000 unit tab Take  by mouth. No current facility-administered medications on file prior to visit. No Known Allergies    OB History    None      Obstetric Comments   Menarche 13, LMP 48, # of children 2, age of 4st delivery 19, Hysterectomy/oophorectomy no/no, Breast bx no, history of breast feeding no, BCP no, Hormone therapy no               ROS    Physical Exam   Cardiovascular: Normal rate, regular rhythm and normal heart sounds. Pulmonary/Chest: Breath sounds normal. Right breast exhibits no inverted nipple, no mass, no nipple discharge, no skin change and no tenderness. Left breast exhibits no inverted nipple, no mass, no nipple discharge, no skin change and no tenderness. Breasts are symmetrical.       Lymphadenopathy:     She has no cervical adenopathy. Right cervical: No superficial cervical, no deep cervical and no posterior cervical adenopathy present.        Left cervical: No superficial cervical, no deep cervical and no posterior cervical adenopathy present. She has no axillary adenopathy. Right axillary: No pectoral and no lateral adenopathy present. Left axillary: No pectoral and no lateral adenopathy present. ASSESSMENT and PLAN    ICD-10-CM ICD-9-CM    1. Ductal carcinoma in situ (DCIS) of right breast D05.11 233.0       Patient presents for f/u s/p RIGHT lumpectomy and SLNBx on 08/01/19, and is doing well overall. Well healed incision s/p lumpectomy, no evidence of local recurrence. Post-op back pain expected to resolve. Pt may treat symptomatically with Tylenol or Advil. No chemo necessary, but we discussed XRT and estrogen blocker for further risk reduction. XRT should begin within 12 weeks of surgery. Pt will reschedule appointments with medical oncology and radiation oncology for further consultation. F/U in 6 months with Guillaume Aquino NP, after RIGHT diagnostic mammogram. This plan was reviewed with the patient and patient agrees. All questions were answered.     Written by Neris Prajapati, as dictated by Dr. Joyce Sandhoff, MD.

## 2019-09-04 NOTE — PROGRESS NOTES
HISTORY OF PRESENT ILLNESS  Patti Morales is a 62 y.o. female. HPI   ESTABLISHED patient here for surgical follow-up. She is S/P RIGHT lumpectomy and SNBX about a month ago. Incisions are well healed, and she has no pain in the breast.  Only complaint is of some pain in the RIGHT axilla and under the rib in her back. Has not seen medical oncology or radiation oncology yet. Cancelled both appointments while she thinks about what she wants to do in the way of treatment. 06/26/19: RIGHT breast bx, 2:00. PATH: DCIS, mucocele-like lesion with atypical ductal hyperplasia and in situ carcinoma, ER+(100%)/ME+(100%). Clinical stage 0.    08/01/19: RIGHT lumpectomy and SLNBx. 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 DCIS, ER+(100%)/ME+(95%), negative margins.  Pathologic stage: pT1b, pN0.    [facility]    Physical Exam    ASSESSMENT and PLAN  {ASSESSMENT/PLAN:[id number]}

## 2019-09-05 NOTE — COMMUNICATION BODY
HISTORY OF PRESENT ILLNESS  Bernice Trejo is a 62 y.o. female. HPI  ESTABLISHED patient here for surgical follow-up. She is S/P RIGHT lumpectomy and SNBX about a month ago. Incisions are well healed, and she has no pain in the breast.  Only complaint is of some pain in the RIGHT axilla and under the rib in her back. Has not seen medical oncology or radiation oncology yet. Cancelled both appointments while she thinks about what she wants to do in the way of treatment.       06/26/19: RIGHT breast bx, 2:00. PATH: DCIS, mucocele-like lesion with atypical ductal hyperplasia and in situ carcinoma, ER+(100%)/AR+(100%). Clinical stage 0.    08/01/19: RIGHT lumpectomy and SLNBx. 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 DCIS, ER+(100%)/AR+(95%), negative margins. Pathologic stage: pT1b, pN0. Past Medical History:   Diagnosis Date    Anemia     resolved after tx     Cancer ([facility] [address] 2019    RIGHT     H.  PYLORI INFECTION     tx in  2008 no stool Ag to confirm tx    Hypercholesterolemia     Ill-defined condition     HIGH CHOLESTEROL - NO LONGER       Past Surgical History:   Procedure Laterality Date    HX APPENDECTOMY  1980    HX BREAST LUMPECTOMY Right 8/1/2019    RIGHT BREAST LUMPECTOMY WITH ULTRASOUND, RIGHT BREAST SENTINEL NODE BIOPSY performed by Regina Kumar MD at [address] [place] [address] AMBULATORY OR       Social History     Socioeconomic History    Marital status:      Spouse name: Not on file    Number of children: Not on file    Years of education: Not on file    Highest education level: Not on file   Occupational History    Not on file   Social Needs    Financial resource strain: Not on file    Food insecurity:     Worry: Not on file     Inability: Not on file    Transportation needs:     Medical: Not on file     Non-medical: Not on file   Tobacco Use    Smoking status: Never Smoker    Smokeless tobacco: Never Used Substance and Sexual Activity    Alcohol use: No     Alcohol/week: 0.0 standard drinks    Drug use: No    Sexual activity: Never   Lifestyle    Physical activity:     Days per week: Not on file     Minutes per session: Not on file    Stress: Not on file   Relationships    Social connections:     Talks on phone: Not on file     Gets together: Not on file     Attends Judaism service: Not on file     Active member of club or organization: Not on file     Attends meetings of clubs or organizations: Not on file     Relationship status: Not on file    Intimate partner violence:     Fear of current or ex partner: Not on file     Emotionally abused: Not on file     Physically abused: Not on file     Forced sexual activity: Not on file   Other Topics Concern    Not on file   Social History Narrative    ** Merged History Encounter **            Current Outpatient Medications on File Prior to Visit   Medication Sig Dispense Refill    cholecalciferol, vitamin D3, (VITAMIN D3) 2,000 unit tab Take  by mouth. No current facility-administered medications on file prior to visit. No Known Allergies    OB History    None      Obstetric Comments   Menarche 13, LMP 48, # of children 2, age of 4st delivery 19, Hysterectomy/oophorectomy no/no, Breast bx no, history of breast feeding no, BCP no, Hormone therapy no               ROS    Physical Exam   Cardiovascular: Normal rate, regular rhythm and normal heart sounds. Pulmonary/Chest: Breath sounds normal. Right breast exhibits no inverted nipple, no mass, no nipple discharge, no skin change and no tenderness. Left breast exhibits no inverted nipple, no mass, no nipple discharge, no skin change and no tenderness. Breasts are symmetrical.       Lymphadenopathy:     She has no cervical adenopathy. Right cervical: No superficial cervical, no deep cervical and no posterior cervical adenopathy present.        Left cervical: No superficial cervical, no deep cervical and no posterior cervical adenopathy present. She has no axillary adenopathy. Right axillary: No pectoral and no lateral adenopathy present. Left axillary: No pectoral and no lateral adenopathy present. ASSESSMENT and PLAN    ICD-10-CM ICD-9-CM    1. Ductal carcinoma in situ (DCIS) of right breast D05.11 233.0       Patient presents for f/u s/p RIGHT lumpectomy and SLNBx on 08/01/19, and is doing well overall. Well healed incision s/p lumpectomy, no evidence of local recurrence. Post-op back pain expected to resolve. Pt may treat symptomatically with Tylenol or Advil. No chemo necessary, but we discussed XRT and estrogen blocker for further risk reduction. XRT should begin within 12 weeks of surgery. Pt will reschedule appointments with medical oncology and radiation oncology for further consultation. F/U in 6 months with Raquel Reddy NP, after RIGHT diagnostic mammogram. This plan was reviewed with the patient and patient agrees. All questions were answered.     Written by Mahogany Gifford, as dictated by Dr. Humaira Jean MD.

## 2019-10-03 ENCOUNTER — OFFICE VISIT (OUTPATIENT)
Dept: FAMILY MEDICINE CLINIC | Age: 54
End: 2019-10-03

## 2019-10-03 VITALS
WEIGHT: 236 LBS | HEIGHT: 72 IN | BODY MASS INDEX: 31.97 KG/M2 | SYSTOLIC BLOOD PRESSURE: 117 MMHG | RESPIRATION RATE: 18 BRPM | OXYGEN SATURATION: 99 % | TEMPERATURE: 98.3 F | DIASTOLIC BLOOD PRESSURE: 81 MMHG | HEART RATE: 77 BPM

## 2019-10-03 DIAGNOSIS — E66.3 OVER WEIGHT: ICD-10-CM

## 2019-10-03 DIAGNOSIS — E55.9 VITAMIN D DEFICIENCY: ICD-10-CM

## 2019-10-03 DIAGNOSIS — D05.11 DUCTAL CARCINOMA IN SITU (DCIS) OF RIGHT BREAST: ICD-10-CM

## 2019-10-03 DIAGNOSIS — E03.9 HYPOTHYROIDISM, ADULT: Primary | ICD-10-CM

## 2019-10-03 DIAGNOSIS — E78.5 DYSLIPIDEMIA: ICD-10-CM

## 2019-10-03 DIAGNOSIS — Z12.11 COLON CANCER SCREENING: ICD-10-CM

## 2019-10-03 RX ORDER — SIMVASTATIN 20 MG/1
TABLET, FILM COATED ORAL
COMMUNITY
Start: 2018-02-12 | End: 2020-10-21 | Stop reason: ALTCHOICE

## 2019-10-03 RX ORDER — TIZANIDINE 2 MG/1
TABLET ORAL
COMMUNITY
Start: 2018-01-17 | End: 2020-06-30 | Stop reason: ALTCHOICE

## 2019-10-03 RX ORDER — LEVOTHYROXINE SODIUM 50 UG/1
TABLET ORAL
COMMUNITY
Start: 2018-02-12 | End: 2020-10-21 | Stop reason: ALTCHOICE

## 2019-10-03 NOTE — PROGRESS NOTES
HISTORY OF PRESENT ILLNESS  Jhon Mendenhall is a 62 y.o. female. Seen for 6 months follow up on hypothyroid, dyslipidemia  HPI  Patient had routine mammogram in 04/2019. Had detailed US and bx ,which was positive as DCIS  S/p RIGHT lumpectomy and SLNBx. 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 DCIS, ER+(100%)/MN+(95%), negative margins. Pathologic stage: pT1b, pN0. Cardiovascular Review  The patient has hyperlipidemia.  She reports taking medications as instructed, no medication side effects noted, no TIA's, no chest pain on exertion, no dyspnea on exertion, noting swelling of ankles, no orthostatic dizziness or lightheadedness, no orthopnea or paroxysmal nocturnal dyspnea, no palpitations, no intermittent claudication symptoms.  Diet and Lifestyle: generally follows a low fat low cholesterol diet, generally follows a low sodium diet, exercises sporadically, nonsmoker.  Lab review: labs reviewed and discussed with patient.    Medicines:again stopped her Simvastatin 20 mg      Lab Results   Component Value Date/Time    Cholesterol, total 214 (H) 04/03/2019 12:18 PM    HDL Cholesterol 56 04/03/2019 12:18 PM    LDL, calculated 144 (H) 04/03/2019 12:18 PM    VLDL, calculated 14 04/03/2019 12:18 PM    Triglyceride 71 04/03/2019 12:18 PM          Endocrine Review  Patient is seen for followup of hypothyroidism.  Since last visit: she stopped her  Levothyroxine 50 mcg  She reports medication compliance: stopped.  She reports the following concerns/problems/med side effects: none.  Lab review: labs reviewed and discussed with patient.    Lab Results   Component Value Date/Time    TSH 3.680 04/03/2019 12:18 PM    TSH 1.800 06/07/2018 11:46 AM    T4, Free 1.33 06/07/2018 11:46 AM         Review of Systems   Constitutional: Negative for chills, fever and malaise/fatigue. HENT: Negative for congestion, ear pain, sore throat and tinnitus.     Eyes: Negative for blurred vision, double vision, pain and discharge. Respiratory: Negative for cough, shortness of breath and wheezing. Cardiovascular: Negative for chest pain, palpitations and leg swelling. Gastrointestinal: Negative for abdominal pain, blood in stool, constipation, diarrhea, nausea and vomiting. Genitourinary: Negative for dysuria, frequency, hematuria and urgency. Musculoskeletal: Negative for back pain, joint pain and myalgias. Skin: Negative for rash. Neurological: Negative for dizziness, tremors, seizures and headaches. Endo/Heme/Allergies: Negative for polydipsia. Does not bruise/bleed easily. Psychiatric/Behavioral: Negative for depression and substance abuse. The patient is not nervous/anxious. Physical Exam   Constitutional: She is oriented to person, place, and time. She appears well-developed and well-nourished. HENT:   Head: Normocephalic and atraumatic. Right Ear: External ear normal.   Mouth/Throat: Oropharynx is clear and moist. No oropharyngeal exudate. Eyes: Pupils are equal, round, and reactive to light. Conjunctivae and EOM are normal. No scleral icterus. Neck: Normal range of motion. Neck supple. No JVD present. No thyromegaly present. Cardiovascular: Normal rate, regular rhythm, normal heart sounds and intact distal pulses. No murmur heard. Pulmonary/Chest: Effort normal and breath sounds normal. She has no wheezes. Abdominal: Soft. Bowel sounds are normal. She exhibits no distension and no mass. Musculoskeletal: Normal range of motion. She exhibits no edema or tenderness. Lymphadenopathy:     She has no cervical adenopathy. Neurological: She is alert and oriented to person, place, and time. She has normal reflexes. No cranial nerve deficit. Skin: Skin is warm and dry. No rash noted. She is not diaphoretic. Psychiatric: She has a normal mood and affect. Nursing note and vitals reviewed.       ASSESSMENT and PLAN  Diagnoses and all orders for this visit:    1. Hypothyroidism, adult  -     TSH AND FREE T4    2. Dyslipidemia  -     METABOLIC PANEL, COMPREHENSIVE  -     LIPID PANEL    3. Colon cancer screening  -     OCCULT BLOOD IMMUNOASSAY,DIAGNOSTIC    4. Over weight  Assessment & Plan:  Discussed abnormal weight, ideal BMI and importance of diet and exercise to loose weight. Referral for exercise program was offered. Printed information about diet and lifestyle modification provided. Wt Readings from Last 3 Encounters:   10/03/19 236 lb (107 kg)   09/04/19 238 lb (108 kg)   08/01/19 238 lb 1.6 oz (108 kg)           5. Vitamin D deficiency  -     VITAMIN D, 25 HYDROXY    6. Ductal carcinoma in situ (DCIS) of right breast  educated her on breast cancer. Strongly recommended to follow up with hematology and radiation oncology for further Rx    Discussed lifestyle issues and health guidance given  Patient was given an after visit summary which includes diagnoses, vital signs, current medications, instructions and references & authorized prescriptions . Results of labs will be conveyed to patient, once available. Pt verbalized instructions I provided and expressed understanding of discussion that was held today.   Follow-up and Dispositions    · Return in about 6 months (around 4/3/2020) for fasting, physical.

## 2019-10-03 NOTE — ASSESSMENT & PLAN NOTE
Discussed abnormal weight, ideal BMI and importance of diet and exercise to loose weight. Referral for exercise program was offered. Printed information about diet and lifestyle modification provided.    Wt Readings from Last 3 Encounters:   10/03/19 236 lb (107 kg)   09/04/19 238 lb (108 kg)   08/01/19 238 lb 1.6 oz (108 kg)

## 2019-10-03 NOTE — PATIENT INSTRUCTIONS
Breast Cancer: Care Instructions  Your Care Instructions    Breast cancer occurs when abnormal cells grow out of control in the breast. These cancer cells can spread within the breast, to nearby lymph nodes and other tissues, and to other parts of the body. Being treated for cancer can weaken your body, and you may feel very tired. Get the rest your body needs so you can feel better. Finding out that you have cancer is scary. You may feel many emotions and may need some help coping. Seek out family, friends, and counselors for support. You also can do things at home to make yourself feel better while you go through treatment. Call the [employer] (0-742.753.6042) or visit its website at [employer] for more information. Follow-up care is a key part of your treatment and safety. Be sure to make and go to all appointments, and call your doctor if you are having problems. It's also a good idea to know your test results and keep a list of the medicines you take. How can you care for yourself at home? · Take your medicines exactly as prescribed. Call your doctor if you think you are having a problem with your medicine. You may get medicine for nausea and vomiting if you have these side effects. · Follow your doctor's instructions to relieve pain. Pain from cancer and surgery can almost always be controlled. Use pain medicine when you first notice pain, before it becomes severe. · Eat healthy food. If you do not feel like eating, try to eat food that has protein and extra calories to keep up your strength and prevent weight loss. Drink liquid meal replacements for extra calories and protein. Try to eat your main meal early. · Get some physical activity every day, but do not get too tired. Keep doing the hobbies you enjoy as your energy allows. · Do not smoke. Smoking can make your cancer worse. If you need help quitting, talk to your doctor about stop-smoking programs and medicines.  These can increase your chances of quitting for good. · Take steps to control your stress and workload. Learn relaxation techniques. ? Share your feelings. Stress and tension affect our emotions. By expressing your feelings to others, you may be able to understand and cope with them. ? Consider joining a support group. Talking about a problem with your spouse, a good friend, or other people with similar problems is a good way to reduce tension and stress. ? Express yourself through art. Try writing, crafts, dance, or art to relieve stress. Some dance, writing, or art groups may be available just for people who have cancer. ? Be kind to your body and mind. Getting enough sleep, eating a healthy diet, and taking time to do things you enjoy can contribute to an overall feeling of balance in your life and can help reduce stress. ? Get help if you need it. Discuss your concerns with your doctor or counselor. · If you are vomiting or have diarrhea:  ? Drink plenty of fluids (enough so that your urine is light yellow or clear like water) to prevent dehydration. Choose water and other caffeine-free clear liquids. If you have kidney, heart, or liver disease and have to limit fluids, talk with your doctor before you increase the amount of fluids you drink. ? When you are able to eat, try clear soups, mild foods, and liquids until all symptoms are gone for 12 to 48 hours. Other good choices include dry toast, crackers, cooked cereal, and gelatin dessert, such as Jell-O.  · If you have not already done so, prepare a list of advance directives. Advance directives are instructions to your doctor and family members about what kind of care you want if you become unable to speak or express yourself. When should you call for help? Call 911 anytime you think you may need emergency care.  For example, call if:    · You passed out (lost consciousness).    Call your doctor now or seek immediate medical care if:    · You have a fever.     · You have abnormal bleeding.     · You think you have an infection.     · You have new or worse pain.     · You have new symptoms, such as a cough, belly pain, vomiting, diarrhea, or a rash.    Watch closely for changes in your health, and be sure to contact your doctor if:    · You are much more tired than usual.     · You have swollen glands in your armpits, groin, or neck.     · You do not get better as expected. Where can you learn more? Go to http://tc-soniya.info/. Enter V321 in the search box to learn more about \"Breast Cancer: Care Instructions. \"  Current as of: December 19, 2018  Content Version: 12.2  © 2946-4134 [employer]. Care instructions adapted under license by [employer] (which disclaims liability or warranty for this information). If you have questions about a medical condition or this instruction, always ask your healthcare professional. [address] any warranty or liability for your use of this information. Preventing Lymphedema After Treatment for Breast Cancer: Care Instructions  Your Care Instructions    Lymphedema is a buildup of fluid in the soft tissues of the body. It can happen in the arm after breast cancer surgery to remove lymph nodes. If there are few or no lymph nodes, fluid can build up in the arm. It can also happen if the lymph system in an arm has been damaged. Infection, tumors, and scar tissue from radiation therapy to the armpit area also can cause fluid to build up. You may be able to avoid lymphedema or keep it under control by following the tips below. Make sure that you take good care of the skin on your arm and hand. Your skin acts as a barrier to keep out bacteria and prevent infection. It is also important not to overuse the muscles in the arm. And don't expose your arm to very hot or cold temperatures. Lymphedema can happen soon after breast cancer treatment.  Or it may happen many years later. It may affect only part of your arm or hand. In some cases, it affects all of the arm. Make sure to follow these precautions even after you finish treatment. Do not ignore tightness or swelling in or around your arm or hand. You are less likely to have long-term problems if you get these symptoms treated right away. Follow-up care is a key part of your treatment and safety. Be sure to make and go to all appointments, and call your doctor if you are having problems. It's also a good idea to know your test results and keep a list of the medicines you take. How can you care for yourself at home?  [facility] care    · Keep your arm, hand, and armpit clean. Use a mild soap that does not dry out your skin.     · Moisturize your skin often.     · Take good care of the skin around your fingernails. Do not bite or cut your cuticles.     · Ask your doctor how to handle any cuts, scratches, insect bites, or other injuries you may get.     · Use sunscreen and insect repellent outdoors to protect your skin from sunburn and insect bites.     · Use an electric razor to shave your armpit. It's less likely to cut or irritate your skin. Activity    · Don't wear clothing or jewelry that is tight on your arm or hand. Your doctor may advise you not to wear a watch or rings on the affected hand.     · Wear gloves when you do activities that could hurt the skin on your fingers or hand. Wear them when you garden, do yard work, wash dishes, and clean with chemicals. Use oven mitts when you handle hot food.     · Do not have blood drawn from the arm on the side of the lymph node surgery. Do not get injections (shots) or have an IV put in the affected arm.     · Do not allow a blood pressure cuff to be placed on that arm. If you are in the hospital, make sure you tell your nurse and other hospital staff about your condition.     · Do not expose your arm to very hot or very cold temperatures.  For example, do not use hot tubs, saunas, or steam rooms. Do not use a heating pad or cold pack on that arm or shoulder.     · Rest your arm often when you do repeated movements, such as vacuum, scrub, or mop.     · Try to use your other arm to carry heavy things, such as grocery bags. Avoid shoulder straps when you carry a briefcase or purse. Carry your purse on your good arm.     · Ask your doctor about wearing a compression sleeve and glove (gauntlet). Your doctor may want you to wear these when you exercise or when you fly in an airplane. They can help keep fluid from pooling in your arm and hand. Exercise    · Ask your doctor about exercises for your arm and hand. Your doctor may recommend that you see a physical therapist. This person can teach you how to do self-massage to move fluid out of your arm.     · Check with your doctor before you start exercises that use the arm. This includes tennis, rowing, or weight lifting. Your doctor can help you find an activity level that is right for you. When should you call for help? Call your doctor now or seek immediate medical care if:    · You have signs of infection, such as:  ? Increased pain, swelling, warmth, or redness. ? Red streaks leading from the area. ? Pus draining from the area. ? A fever.    Watch closely for changes in your health, and be sure to contact your doctor if:    · You have new or worse symptoms from lymphedema.     · You do not get better as expected. Where can you learn more? Go to http://tc-soniya.info/. Enter G546 in the search box to learn more about \"Preventing Lymphedema After Treatment for Breast Cancer: Care Instructions. \"  Current as of: December 19, 2018  Content Version: 12.2  © 2783-6836 [employer]. Care instructions adapted under license by [employer] (which disclaims liability or warranty for this information).  If you have questions about a medical condition or this instruction, always ask your healthcare professional. [address] any warranty or liability for your use of this information.

## 2019-10-03 NOTE — LETTER
NOTIFICATION RETURN TO WORK     10/3/2019 11:36 AM    Ms. Raquel Streeter  [address] [place] [address] [address] [phone]      To Whom It May Concern:    Raqeul Streeter is currently under the care of [facility]. She was seen in office today. will return to work/school on: 10/04/2019    If there are questions or concerns please have the patient contact our office.         Sincerely,      Clint Monson MD

## 2019-10-03 NOTE — PROGRESS NOTES
Chief Complaint   Patient presents with    Cholesterol Problem     Follow up, Fasting    Thyroid Problem     1. Have you been to the ER, urgent care clinic since your last visit? Hospitalized since your last visit? No    2. Have you seen or consulted any other health care providers outside of the [address] [place] [address] since your last visit? Include any pap smears or colon screening.  No

## 2019-10-04 LAB
25(OH)D3+25(OH)D2 SERPL-MCNC: 33.4 NG/ML (ref 30–100)
ALBUMIN SERPL-MCNC: 4.1 G/DL (ref 3.5–5.5)
ALBUMIN/GLOB SERPL: 1.7 {RATIO} (ref 1.2–2.2)
ALP SERPL-CCNC: 88 IU/L (ref 39–117)
ALT SERPL-CCNC: 7 IU/L (ref 0–32)
AST SERPL-CCNC: 10 IU/L (ref 0–40)
BILIRUB SERPL-MCNC: 1 MG/DL (ref 0–1.2)
BUN SERPL-MCNC: 7 MG/DL (ref 6–24)
BUN/CREAT SERPL: 9 (ref 9–23)
CALCIUM SERPL-MCNC: 9 MG/DL (ref 8.7–10.2)
CHLORIDE SERPL-SCNC: 106 MMOL/L (ref 96–106)
CHOLEST SERPL-MCNC: 212 MG/DL (ref 100–199)
CO2 SERPL-SCNC: 25 MMOL/L (ref 20–29)
CREAT SERPL-MCNC: 0.74 MG/DL (ref 0.57–1)
GLOBULIN SER CALC-MCNC: 2.4 G/DL (ref 1.5–4.5)
GLUCOSE SERPL-MCNC: 87 MG/DL (ref 65–99)
HDLC SERPL-MCNC: 56 MG/DL
HEMOCCULT STL QL IA: NEGATIVE
INTERPRETATION, 910389: NORMAL
LDLC SERPL CALC-MCNC: 143 MG/DL (ref 0–99)
POTASSIUM SERPL-SCNC: 4.3 MMOL/L (ref 3.5–5.2)
PROT SERPL-MCNC: 6.5 G/DL (ref 6–8.5)
SODIUM SERPL-SCNC: 142 MMOL/L (ref 134–144)
T4 FREE SERPL-MCNC: 1.07 NG/DL (ref 0.82–1.77)
TRIGL SERPL-MCNC: 64 MG/DL (ref 0–149)
TSH SERPL DL<=0.005 MIU/L-ACNC: 2.85 UIU/ML (ref 0.45–4.5)
VLDLC SERPL CALC-MCNC: 13 MG/DL (ref 5–40)

## 2019-10-04 NOTE — PROGRESS NOTES
Please inform patient,  Thyroid, kidney,liver,cholesterol results are normal. No need to resume her Levothyroxine and Simvastatin, ( she didn't take medicines for last 6 months)  Vitamin D also normal  Over all good results  thanks Post-Op Assessment Note      CV Status:  Stable    Post-procedure mental status: Pt  left intubated; Unresponsive; ETT in place  and secure  Hydration Status:  Stable    PONV Controlled:  None    Airway Patency:  Patent  Airway: intubated    Post Op Vitals Reviewed: Yes          Staff: CRNA       Comments: Pt  to ICU, fully monitored enroute; Course uneventful; VSS; Pt  transferred with a Propofol infusion at 100 mcg kg min  Airway patent  Pt  placed on Vent as per ICU Attendungs order            /67 (09/10/18 1741)    Temp      Pulse 58 (09/10/18 1741)   Resp 14 (09/10/18 1741)    SpO2 100 % (09/10/18 1741)

## 2019-10-04 NOTE — PROGRESS NOTES
Outbound call placed to patient. Name and  verified. Call placed for patients most recent lab results. Patient reported understanding and appreciative of call.

## 2019-10-07 NOTE — PROGRESS NOTES
Call placed to patient. Name and  verified. reviewed recent labs with patient. She verbalized understanding.

## 2020-03-03 ENCOUNTER — OFFICE VISIT (OUTPATIENT)
Dept: SURGERY | Age: 55
End: 2020-03-03

## 2020-03-03 VITALS
BODY MASS INDEX: 31.97 KG/M2 | DIASTOLIC BLOOD PRESSURE: 88 MMHG | WEIGHT: 236 LBS | HEART RATE: 87 BPM | SYSTOLIC BLOOD PRESSURE: 148 MMHG | HEIGHT: 72 IN

## 2020-03-03 DIAGNOSIS — Z98.890 S/P LUMPECTOMY, RIGHT BREAST: ICD-10-CM

## 2020-03-03 DIAGNOSIS — D05.11 DUCTAL CARCINOMA IN SITU (DCIS) OF RIGHT BREAST: Primary | ICD-10-CM

## 2020-03-03 NOTE — LETTER
NOTIFICATION RETURN TO WORK     3/3/2020 1:33 PM    Ms. Compa Goff [address] [place] [address] [phone]      To Whom It May Concern:    Kathleen Livingston is currently under the care of [address] [place] [address]. She will return to work on: Wednesday, 3/4/2020. If there are questions or concerns please have the patient contact our office.         Sincerely,      Jona Gilford, NP

## 2020-03-03 NOTE — PATIENT INSTRUCTIONS

## 2020-03-03 NOTE — PROGRESS NOTES
HISTORY OF PRESENT ILLNESS  Raymond Barroso is a 60 y.o. female. HPI  ESTABLISHED patient here for 6 month follow up RIGHT breast cancer. She is doing well. Denies any breast lumps or changes. No pain. Declined medical oncology and radiation oncology appointments. Breast history -  Referring - Dr. Vamsi Rondon  06/26/19: RIGHT breast bx, 2:00. PATH: DCIS, mucocele-like lesion with atypical ductal hyperplasia and in situ carcinoma, ER+(100%)/FL+(100%). Clinical stage 0.  08/01/19: RIGHT lumpectomy and SLNBx. 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 DCIS, ER+(100%)/FL+(95%), negative margins. Pathologic stage: pT1b, pN0. OB History    No obstetric history on file. Obstetric Comments   Menarche 13, LMP 48, # of children 2, age of 4st delivery 19, Hysterectomy/oophorectomy no/no, Breast bx no, history of breast feeding no, BCP no, Hormone therapy no             Past Surgical History:   Procedure Laterality Date    HX APPENDECTOMY  1980    HX BREAST LUMPECTOMY Right 8/1/2019    RIGHT BREAST LUMPECTOMY WITH ULTRASOUND, RIGHT BREAST SENTINEL NODE BIOPSY performed by Maia Garcia MD at [address] [place] [address]    Physical Exam  Constitutional:       Appearance: Normal appearance. Chest:      Breasts:         Right: No mass, nipple discharge, skin change (well healed surgical incision) or tenderness. Left: No mass, nipple discharge, skin change or tenderness. Musculoskeletal: Normal range of motion. Comments: UE x 2   Lymphadenopathy:      Upper Body:      Right upper body: No supraclavicular or axillary adenopathy. Left upper body: No supraclavicular or axillary adenopathy. Neurological:      Mental Status: She is alert.    Psychiatric:         Attention and Perception: Attention normal.         Mood and Affect: Mood normal.         Speech: Speech normal.         Behavior: Behavior normal.       Visit Vitals  /88   Pulse 87   Ht 6' 4\" (1.93 m)   Wt 236 lb (107 kg)   BMI 28.73 kg/m²     ASSESSMENT and PLAN  Encounter Diagnoses   Name Primary?  Ductal carcinoma in situ (DCIS) of right breast Yes    S/P lumpectomy, right breast      Normal exam with no evidence of local recurrence. Discussed referral to med onc and rad onc and she declined - she does not want to have additional treatment. She had also discussed this with Dr. Cooper Opitz. Reviewed s/sx of local recurrence and the importance of follow-up breast imaging. BDmammogram 3D in 4/2020. RTC in 6 months or sooner PRN. She is comfortable with this plan. All questions answered and she stated understanding.

## 2020-06-30 ENCOUNTER — OFFICE VISIT (OUTPATIENT)
Dept: FAMILY MEDICINE CLINIC | Age: 55
End: 2020-06-30

## 2020-06-30 VITALS
RESPIRATION RATE: 16 BRPM | DIASTOLIC BLOOD PRESSURE: 72 MMHG | HEIGHT: 72 IN | WEIGHT: 249 LBS | BODY MASS INDEX: 33.72 KG/M2 | HEART RATE: 64 BPM | SYSTOLIC BLOOD PRESSURE: 123 MMHG | TEMPERATURE: 98 F | OXYGEN SATURATION: 98 %

## 2020-06-30 DIAGNOSIS — E78.5 DYSLIPIDEMIA: ICD-10-CM

## 2020-06-30 DIAGNOSIS — Z00.00 ROUTINE GENERAL MEDICAL EXAMINATION AT A HEALTH CARE FACILITY: Primary | ICD-10-CM

## 2020-06-30 DIAGNOSIS — B30.9 VIRAL CONJUNCTIVITIS OF RIGHT EYE: ICD-10-CM

## 2020-06-30 DIAGNOSIS — E03.9 HYPOTHYROIDISM, ADULT: ICD-10-CM

## 2020-06-30 DIAGNOSIS — E66.9 OBESITY (BMI 30.0-34.9): ICD-10-CM

## 2020-06-30 DIAGNOSIS — E55.9 VITAMIN D DEFICIENCY: ICD-10-CM

## 2020-06-30 PROBLEM — E66.3 OVER WEIGHT: Status: RESOLVED | Noted: 2019-04-03 | Resolved: 2020-06-30

## 2020-06-30 RX ORDER — PREDNISOLONE ACETATE 10 MG/ML
1 SUSPENSION/ DROPS OPHTHALMIC 4 TIMES DAILY
Qty: 5 ML | Refills: 0 | Status: SHIPPED | OUTPATIENT
Start: 2020-06-30 | End: 2020-10-21 | Stop reason: ALTCHOICE

## 2020-06-30 NOTE — PROGRESS NOTES
Chief Complaint   Patient presents with    Complete Physical     Fasting     1. Have you been to the ER, urgent care clinic since your last visit? Hospitalized since your last visit? No    2. Have you seen or consulted any other health care providers outside of the [address] [place] [address] since your last visit? Include any pap smears or colon screening.  No

## 2020-06-30 NOTE — LETTER
NOTIFICATION RETURN TO WORK     6/30/2020 9:56 AM    Ms. Nikos Quinteros  [address] [place] [address] [address] [phone]      To Whom It May Concern:    Nikos Quinteros is currently under the care of [facility]. She will return to work/school on: 07/01/2020    If there are questions or concerns please have the patient contact our office.         Sincerely,      Ted Adair MD

## 2020-06-30 NOTE — PROGRESS NOTES
HISTORY OF PRESENT ILLNESS  Quin Burroughs is a 60 y.o. female. seen today for complete physical. She had appointment with breast surgeon and has order for 3 D mammogram, but due to Kash, as imaging center was closed, has not completed yet. H/o dyslipidemia and hypothyroid, but has stopped medicines for almost a year now. HPI  Health Maintenance  Immunizations:   Influenza: she will plan on getting it this fall. Tetanus: patient declines. Shingles: patient declined. Pneumonia: n/a. Cancer screening:   Cervical: reviewed guidelines, UTD, done by OBGYN, records requested. Breast: reviewed guidelines, not up to date - gave her number to schedule. Colon: reviewed guidelines, up to date. Patient Care Team:  John Figueroa MD as PCP - General (Family Practice)  John Figueroa MD as PCP - [facility] Empaneled Provider  Su Hess MD (Orthopedic Surgery)  Ronaldo Irizarry MD (Breast Surgery)      The following sections were reviewed & updated as appropriate: PMH, PSH, FH, and SH. Review of Systems   Constitutional: Negative for chills, fever and malaise/fatigue. HENT: Negative for congestion, ear pain, sore throat and tinnitus. Eyes: Positive for pain (right) and discharge. Negative for blurred vision and double vision. Respiratory: Negative for cough, shortness of breath and wheezing. Cardiovascular: Negative for chest pain, palpitations and leg swelling. Gastrointestinal: Negative for abdominal pain, blood in stool, constipation, diarrhea, nausea and vomiting. Genitourinary: Negative for dysuria, frequency, hematuria and urgency. Musculoskeletal: Positive for back pain. Negative for joint pain and myalgias. Skin: Negative for rash. Neurological: Negative for dizziness, tremors, seizures and headaches. Endo/Heme/Allergies: Negative for polydipsia. Does not bruise/bleed easily.    Psychiatric/Behavioral: Negative for depression and substance abuse. The patient is not nervous/anxious. Physical Exam  Vitals signs and nursing note reviewed. Constitutional:       Appearance: She is well-developed. HENT:      Head: Normocephalic and atraumatic. Right Ear: External ear normal.      Left Ear: External ear normal.      Nose: Nose normal.   Eyes:      General: Lids are normal. No scleral icterus. Extraocular Movements: Extraocular movements intact. Right eye: Normal extraocular motion. Left eye: Normal extraocular motion. Conjunctiva/sclera:      Right eye: Right conjunctiva is injected. No exudate or hemorrhage. Left eye: Left conjunctiva is not injected. No exudate or hemorrhage. Pupils: Pupils are equal, round, and reactive to light. Comments: Right side cataract   Neck:      Musculoskeletal: Normal range of motion and neck supple. Thyroid: No thyromegaly. Vascular: No JVD. Cardiovascular:      Rate and Rhythm: Normal rate and regular rhythm. Heart sounds: Normal heart sounds. No murmur. No friction rub. No gallop. Pulmonary:      Effort: Pulmonary effort is normal.      Breath sounds: Normal breath sounds. No wheezing or rales. Chest:      Chest wall: No tenderness. Breasts: Breasts are symmetrical.         Right: No inverted nipple, mass, nipple discharge, skin change or tenderness. Left: No inverted nipple, mass, nipple discharge, skin change or tenderness. Abdominal:      General: Bowel sounds are normal. There is no distension. Palpations: Abdomen is soft. There is no mass. Tenderness: There is no abdominal tenderness. Musculoskeletal: Normal range of motion. General: No tenderness. Lymphadenopathy:      Cervical: No cervical adenopathy. Skin:     General: Skin is warm and dry. Findings: No rash. Neurological:      Mental Status: She is alert and oriented to person, place, and time. Cranial Nerves: No cranial nerve deficit.       Deep Tendon Reflexes: Reflexes are normal and symmetric. Psychiatric:         Behavior: Behavior normal.         Thought Content: Thought content normal.         Judgment: Judgment normal.         ASSESSMENT and PLAN  Diagnoses and all orders for this visit:    1. Routine general medical examination at a health care facility  -     CBC WITH AUTOMATED DIFF  -     METABOLIC PANEL, COMPREHENSIVE  -     HEMOGLOBIN A1C WITH EAG  -     URINALYSIS W/ RFLX MICROSCOPIC  -     LIPID PANEL    2. Hypothyroidism, adult  -     TSH AND FREE T4    3. Dyslipidemia    4. Vitamin D deficiency  -     VITAMIN D, 25 HYDROXY    5. Viral conjunctivitis of right eye  -     prednisoLONE acetate (PRED FORTE) 1 % ophthalmic suspension; Administer 1 Drop to both eyes four (4) times daily. 6. Obesity (BMI 30.0-34. 9)  Assessment & Plan:  Discussed abnormal weight, ideal BMI and importance of diet and exercise to loose weight. Referral for exercise program was offered. Printed information about diet and lifestyle modification provided. Discussed lifestyle issues and health guidance given  Patient was given an after visit summary which includes diagnoses, vital signs, current medications, instructions and references & authorized prescriptions . Results of labs will be conveyed to patient, once available. Pt verbalized instructions I provided and expressed understanding of discussion that was held today. Follow-up and Dispositions    · Return in about 6 months (around 12/30/2020) for follow up, fasting.

## 2020-06-30 NOTE — PATIENT INSTRUCTIONS

## 2020-07-01 LAB
25(OH)D3+25(OH)D2 SERPL-MCNC: 36.3 NG/ML (ref 30–100)
ALBUMIN SERPL-MCNC: 3.7 G/DL (ref 3.8–4.9)
ALBUMIN/GLOB SERPL: 1.4 {RATIO} (ref 1.2–2.2)
ALP SERPL-CCNC: 83 IU/L (ref 39–117)
ALT SERPL-CCNC: 7 IU/L (ref 0–32)
APPEARANCE UR: CLEAR
AST SERPL-CCNC: 10 IU/L (ref 0–40)
BASOPHILS # BLD AUTO: 0 X10E3/UL (ref 0–0.2)
BASOPHILS NFR BLD AUTO: 1 %
BILIRUB SERPL-MCNC: 0.6 MG/DL (ref 0–1.2)
BILIRUB UR QL STRIP: NEGATIVE
BUN SERPL-MCNC: 7 MG/DL (ref 6–24)
BUN/CREAT SERPL: 11 (ref 9–23)
CALCIUM SERPL-MCNC: 8.7 MG/DL (ref 8.7–10.2)
CHLORIDE SERPL-SCNC: 108 MMOL/L (ref 96–106)
CHOLEST SERPL-MCNC: 176 MG/DL (ref 100–199)
CO2 SERPL-SCNC: 25 MMOL/L (ref 20–29)
COLOR UR: YELLOW
CREAT SERPL-MCNC: 0.66 MG/DL (ref 0.57–1)
EOSINOPHIL # BLD AUTO: 0.4 X10E3/UL (ref 0–0.4)
EOSINOPHIL NFR BLD AUTO: 10 %
ERYTHROCYTE [DISTWIDTH] IN BLOOD BY AUTOMATED COUNT: 12.5 % (ref 11.7–15.4)
EST. AVERAGE GLUCOSE BLD GHB EST-MCNC: 105 MG/DL
GLOBULIN SER CALC-MCNC: 2.6 G/DL (ref 1.5–4.5)
GLUCOSE SERPL-MCNC: 91 MG/DL (ref 65–99)
GLUCOSE UR QL: NEGATIVE
HBA1C MFR BLD: 5.3 % (ref 4.8–5.6)
HCT VFR BLD AUTO: 39 % (ref 34–46.6)
HDLC SERPL-MCNC: 51 MG/DL
HGB BLD-MCNC: 13 G/DL (ref 11.1–15.9)
HGB UR QL STRIP: NEGATIVE
IMM GRANULOCYTES # BLD AUTO: 0 X10E3/UL (ref 0–0.1)
IMM GRANULOCYTES NFR BLD AUTO: 0 %
INTERPRETATION, 910389: NORMAL
KETONES UR QL STRIP: NEGATIVE
LDLC SERPL CALC-MCNC: 113 MG/DL (ref 0–99)
LEUKOCYTE ESTERASE UR QL STRIP: NEGATIVE
LYMPHOCYTES # BLD AUTO: 1.4 X10E3/UL (ref 0.7–3.1)
LYMPHOCYTES NFR BLD AUTO: 38 %
MCH RBC QN AUTO: 32 PG (ref 26.6–33)
MCHC RBC AUTO-ENTMCNC: 33.3 G/DL (ref 31.5–35.7)
MCV RBC AUTO: 96 FL (ref 79–97)
MICRO URNS: ABNORMAL
MONOCYTES # BLD AUTO: 0.4 X10E3/UL (ref 0.1–0.9)
MONOCYTES NFR BLD AUTO: 11 %
NEUTROPHILS # BLD AUTO: 1.5 X10E3/UL (ref 1.4–7)
NEUTROPHILS NFR BLD AUTO: 40 %
NITRITE UR QL STRIP: NEGATIVE
PH UR STRIP: 8 [PH] (ref 5–7.5)
PLATELET # BLD AUTO: 235 X10E3/UL (ref 150–450)
POTASSIUM SERPL-SCNC: 4.3 MMOL/L (ref 3.5–5.2)
PROT SERPL-MCNC: 6.3 G/DL (ref 6–8.5)
PROT UR QL STRIP: NEGATIVE
RBC # BLD AUTO: 4.06 X10E6/UL (ref 3.77–5.28)
SODIUM SERPL-SCNC: 143 MMOL/L (ref 134–144)
SP GR UR: 1.02 (ref 1–1.03)
T4 FREE SERPL-MCNC: 1.14 NG/DL (ref 0.82–1.77)
TRIGL SERPL-MCNC: 59 MG/DL (ref 0–149)
TSH SERPL DL<=0.005 MIU/L-ACNC: 3.67 UIU/ML (ref 0.45–4.5)
UROBILINOGEN UR STRIP-MCNC: 0.2 MG/DL (ref 0.2–1)
VLDLC SERPL CALC-MCNC: 12 MG/DL (ref 5–40)
WBC # BLD AUTO: 3.7 X10E3/UL (ref 3.4–10.8)

## 2020-07-01 NOTE — PROGRESS NOTES
Please inform patient,  Thyroid function, blood count,kidney,liver,cholesterol,urine ,vitamin d all results are very normal  thanks

## 2020-08-06 ENCOUNTER — HOSPITAL ENCOUNTER (OUTPATIENT)
Dept: MAMMOGRAPHY | Age: 55
Discharge: HOME | End: 2020-08-06
Attending: NURSE PRACTITIONER
Payer: COMMERCIAL

## 2020-08-06 DIAGNOSIS — Z98.890 S/P LUMPECTOMY, RIGHT BREAST: ICD-10-CM

## 2020-08-06 DIAGNOSIS — D05.11 DUCTAL CARCINOMA IN SITU (DCIS) OF RIGHT BREAST: ICD-10-CM

## 2020-08-06 PROCEDURE — 77062 BREAST TOMOSYNTHESIS BI: CPT

## 2020-10-21 ENCOUNTER — OFFICE VISIT (OUTPATIENT)
Dept: FAMILY MEDICINE CLINIC | Age: 55
End: 2020-10-21
Payer: COMMERCIAL

## 2020-10-21 VITALS
TEMPERATURE: 98 F | OXYGEN SATURATION: 98 % | WEIGHT: 244 LBS | BODY MASS INDEX: 33.05 KG/M2 | RESPIRATION RATE: 18 BRPM | DIASTOLIC BLOOD PRESSURE: 83 MMHG | HEART RATE: 73 BPM | HEIGHT: 72 IN | SYSTOLIC BLOOD PRESSURE: 138 MMHG

## 2020-10-21 DIAGNOSIS — S00.03XA HEMATOMA OF SCALP, INITIAL ENCOUNTER: ICD-10-CM

## 2020-10-21 DIAGNOSIS — Z23 ENCOUNTER FOR IMMUNIZATION: ICD-10-CM

## 2020-10-21 DIAGNOSIS — S80.02XA CONTUSION OF LEFT KNEE, INITIAL ENCOUNTER: ICD-10-CM
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
## 2020-10-21 DIAGNOSIS — S46.812A TRAPEZIUS MUSCLE STRAIN, LEFT, INITIAL ENCOUNTER: ICD-10-CM

## 2020-10-21 PROBLEM — Z86.000 HISTORY OF DUCTAL CARCINOMA IN SITU (DCIS) OF BREAST: Status: ACTIVE | Noted: 2019-06-26

## 2020-10-21 PROCEDURE — 99214 OFFICE O/P EST MOD 30 MIN: CPT | Performed by: FAMILY MEDICINE

## 2020-10-21 PROCEDURE — 90686 IIV4 VACC NO PRSV 0.5 ML IM: CPT

## 2020-10-21 PROCEDURE — 90471 IMMUNIZATION ADMIN: CPT

## 2020-10-21 RX ORDER — METHOCARBAMOL 500 MG/1
500 TABLET, FILM COATED ORAL
Qty: 30 TAB | Refills: 0 | Status: SHIPPED | OUTPATIENT
Start: 2020-10-21 | End: 2020-12-18 | Stop reason: ALTCHOICE

## 2020-10-21 RX ORDER — DICLOFENAC SODIUM 75 MG/1
75 TABLET, DELAYED RELEASE ORAL 2 TIMES DAILY
Qty: 30 TAB | Refills: 0 | Status: SHIPPED | OUTPATIENT
Start: 2020-10-21 | End: 2020-12-18 | Stop reason: ALTCHOICE

## 2020-10-21 RX ORDER — ONDANSETRON 4 MG/1
4 TABLET, ORALLY DISINTEGRATING ORAL
COMMUNITY
Start: 2020-10-14 | End: 2020-10-21 | Stop reason: ALTCHOICE

## 2020-10-21 RX ORDER — HYDROCODONE BITARTRATE AND ACETAMINOPHEN 5; 325 MG/1; MG/1
1 TABLET ORAL
COMMUNITY
Start: 2020-10-14 | End: 2020-12-18 | Stop reason: ALTCHOICE
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
## 2020-10-21 NOTE — LETTER
NOTIFICATION RETURN TO WORK     10/21/2020 12:12 PM    Ms. Bill Lawson  [address] [place] [address] [address] [id number]      To Whom It May Concern:    Bill Lawson is currently under the care of IMTIAZ Torrez. She will return to work/school on: 11/01/2020    If there are questions or concerns please have the patient contact our office.         Sincerely,      Monica Mcdaniel MD

## 2020-10-21 NOTE — PATIENT INSTRUCTIONS
Hematoma: Care Instructions  Your Care Instructions     A hematoma is a bad bruise. It happens when an injury causes blood to collect and pool under the skin. The pooling blood gives the skin a spongy, rubbery, lumpy feel. A hematoma usually is not a cause for concern. It is not the same thing as a blood clot in a vein, and it does not cause blood clots. Follow-up care is a key part of your treatment and safety. Be sure to make and go to all appointments, and call your doctor if you are having problems. It's also a good idea to know your test results and keep a list of the medicines you take. How can you care for yourself at home? · Rest and protect the bruised area. · Put ice or a cold pack on the area for 10 to 20 minutes at a time. · Prop up the bruised area on a pillow when you ice it or anytime you sit or lie down during the next 3 days. Try to keep it above the level of your heart. This will help reduce swelling. · Wrapping the bruised area with an elastic bandage such as an Ace wrap will help decrease swelling. Don't wrap it too tightly, as this can cause more swelling below the affected area. · Be safe with medicines. Read and follow all instructions on the label. ? If the doctor gave you a prescription medicine for pain, take it as prescribed. ? If you are not taking a prescription pain medicine, ask your doctor if you can take an over-the-counter medicine. · Do not take two or more pain medicines at the same time unless the doctor told you to. Many pain medicines have acetaminophen, which is Tylenol. Too much acetaminophen (Tylenol) can be harmful. When should you call for help? Call your doctor now or seek immediate medical care if:    · You have signs of skin infection, such as:  ? Increased pain, swelling, warmth, or redness. ? Red streaks leading from the area. ? Pus draining from the area. ? A fever.    Watch closely for changes in your health, and be sure to contact your doctor if:    · The bruise lasts longer than 4 weeks.     · The bruise gets bigger or becomes more painful.     · You do not get better as expected. Where can you learn more? Go to http://www.gray.com/  Enter P911 in the search box to learn more about \"Hematoma: Care Instructions. \"  Current as of: June 26, 2019               Content Version: 12.6  © 9554-8762 [employer]. Care instructions adapted under license by [employer] (which disclaims liability or warranty for this information). If you have questions about a medical condition or this instruction, always ask your healthcare professional. [place] [address] any warranty or liability for your use of this information. Neck Spasm: Exercises  Introduction  Here are some examples of exercises for you to try. The exercises may be suggested for a condition or for rehabilitation. Start each exercise slowly. Ease off the exercises if you start to have pain. You will be told when to start these exercises and which ones will work best for you. How to do the exercises  Levator scapula stretch   1. Sit in a firm chair, or stand up straight. 2. Gently tilt your head toward your left shoulder. 3. Turn your head to look down into your armpit, bending your head slightly forward. Let the weight of your head stretch your neck muscles. 4. Hold for 15 to 30 seconds. 5. Return to your starting position. 6. Follow the same instructions above, but tilt your head toward your right shoulder. 7. Repeat 2 to 4 times toward each shoulder. Upper trapezius stretch   1. Sit in a firm chair, or stand up straight. 2. This stretch works best if you keep your shoulder down as you lean away from it. To help you remember to do this, start by relaxing your shoulders and lightly holding on to your thighs or your chair. 3. Tilt your head toward your shoulder and hold for 15 to 30 seconds.  Let the weight of your head stretch your muscles. 4. If you would like a little added stretch, place your arm behind your back. Use the arm opposite of the direction you are tilting your head. For example, if you are tilting your head to the left, place your right arm behind your back. 5. Repeat 2 to 4 times toward each shoulder. Neck rotation   1. Sit in a firm chair, or stand up straight. 2. Keeping your chin level, turn your head to the right, and hold for 15 to 30 seconds. 3. Turn your head to the left, and hold for 15 to 30 seconds. 4. Repeat 2 to 4 times to each side. Chin tuck   1. Lie on the floor with a rolled-up towel under your neck. Your head should be touching the floor. 2. Slowly bring your chin toward the front of your neck. 3. Hold for a count of 6, and then relax for up to 10 seconds. 4. Repeat 8 to 12 times. Forward neck flexion   1. Sit in a firm chair, or stand up straight. 2. Bend your head forward. 3. Hold for 15 to 30 seconds, then return to your starting position. 4. Repeat 2 to 4 times. Follow-up care is a key part of your treatment and safety. Be sure to make and go to all appointments, and call your doctor if you are having problems. It's also a good idea to know your test results and keep a list of the medicines you take. Where can you learn more? Go to http://www.gray.com/  Enter P962 in the search box to learn more about \"Neck Spasm: Exercises. \"  Current as of: March 2, 2020               Content Version: 12.6  © 9530-7742 [employer]. Care instructions adapted under license by [employer] (which disclaims liability or warranty for this information). If you have questions about a medical condition or this instruction, always ask your healthcare professional. [address] any warranty or liability for your use of this information.
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
## 2020-10-28 ENCOUNTER — TELEPHONE (OUTPATIENT)
Dept: FAMILY MEDICINE CLINIC | Age: 55
End: 2020-10-28

## 2020-10-28 DIAGNOSIS — S46.812A TRAPEZIUS MUSCLE STRAIN, LEFT, INITIAL ENCOUNTER: ICD-10-CM

## 2020-10-28 DIAGNOSIS — S80.02XA CONTUSION OF LEFT KNEE, INITIAL ENCOUNTER: Primary | ICD-10-CM

## 2020-10-28 NOTE — TELEPHONE ENCOUNTER
----- Message from Bianca Mar sent at 10/28/2020  2:18 PM EDT -----  Regarding: Dr. Ginnie Boeck Message/Vendor Calls    Caller's first and last name:Patient      Reason for call: patient would like to be referred to a orthopedic for her knee pain and arm pain      Callback required yes/no and why: yes      Best contact number(s): [phone]      Details to clarify the request:      Bianca Mar

## 2020-12-18 ENCOUNTER — OFFICE VISIT (OUTPATIENT)
Dept: FAMILY MEDICINE CLINIC | Age: 55
End: 2020-12-18
Payer: COMMERCIAL

## 2020-12-18 VITALS
HEIGHT: 72 IN | SYSTOLIC BLOOD PRESSURE: 124 MMHG | WEIGHT: 246 LBS | DIASTOLIC BLOOD PRESSURE: 81 MMHG | HEART RATE: 60 BPM | TEMPERATURE: 97.7 F | OXYGEN SATURATION: 97 % | RESPIRATION RATE: 20 BRPM | BODY MASS INDEX: 33.32 KG/M2

## 2020-12-18 DIAGNOSIS — Z12.11 SCREEN FOR COLON CANCER: ICD-10-CM

## 2020-12-18 DIAGNOSIS — E55.9 VITAMIN D DEFICIENCY: ICD-10-CM

## 2020-12-18 DIAGNOSIS — E03.9 HYPOTHYROIDISM, ADULT: Primary | ICD-10-CM

## 2020-12-18 DIAGNOSIS — E78.5 DYSLIPIDEMIA: ICD-10-CM

## 2020-12-18 PROCEDURE — 99213 OFFICE O/P EST LOW 20 MIN: CPT | Performed by: FAMILY MEDICINE

## 2020-12-18 RX ORDER — ACETAMINOPHEN 325 MG/1
TABLET ORAL
COMMUNITY

## 2020-12-18 NOTE — PROGRESS NOTES
Chief Complaint   Patient presents with   [facility]     f/u from October; still having pain in left knee and left shoulder       1. Have you been to the ER, urgent care clinic since your last visit? Hospitalized since your last visit? No    2. Have you seen or consulted any other health care providers outside of the [address] [place] [address] since your last visit? Include any pap smears or colon screening. No    3 most recent PHQ Screens 6/30/2020   Little interest or pleasure in doing things Not at all   Feeling down, depressed, irritable, or hopeless Not at all   Total Score PHQ 2 0       Health Maintenance Due   Topic Date Due    DTaP/Tdap/Td series (1 - Tdap) 01/01/1984    Shingrix Vaccine Age 50> (1 of 2) 01/01/2013    Colorectal Cancer Screening Combo  10/03/2020    PAP AKA CERVICAL CYTOLOGY  12/18/2020     No recent PAP or colonoscopy.   Pt declined vaccines

## 2020-12-18 NOTE — PATIENT INSTRUCTIONS

## 2020-12-18 NOTE — PROGRESS NOTES
HISTORY OF PRESENT ILLNESS  Miguel Singh is a 60 y.o. female. seen for 6 months follow up on hypothyroid and dyslipidemia. Her shoulder pain and knee pain has improved significantly since she is getting PT with ortho,Dr Yi Olmstead  H/o DCIS and s/p lumpectomy  Follow up mammogram in 08/2020 was very reassuring  HPI  Cardiovascular Review  The patient has hyperlipidemia.  She reports taking medications as instructed, no medication side effects noted, no TIA's, no chest pain on exertion, no dyspnea on exertion, noting swelling of ankles, no orthostatic dizziness or lightheadedness, no orthopnea or paroxysmal nocturnal dyspnea, no palpitations, no intermittent claudication symptoms.  Diet and Lifestyle: generally follows a low fat low cholesterol diet, generally follows a low sodium diet, exercises sporadically, nonsmoker.  Lab review: labs reviewed and discussed with patient.    Medicines:again stopped her Simvastatin 20 mg   Lab Results   Component Value Date/Time    Cholesterol, total 176 06/30/2020 10:29 AM    HDL Cholesterol 51 06/30/2020 10:29 AM    LDL, calculated 113 (H) 06/30/2020 10:29 AM    VLDL, calculated 12 06/30/2020 10:29 AM    Triglyceride 59 06/30/2020 10:29 AM          Endocrine Review  Patient is seen for followup of hypothyroidism.  Since last visit: she stopped her  Levothyroxine 50 mcg  She reports medication compliance: stopped.  She reports the following concerns/problems/med side effects: none.  Lab review: labs reviewed and discussed with patient.    Lab Results   Component Value Date/Time    TSH 3.670 06/30/2020 10:29 AM    T4, Free 1.14 06/30/2020 10:29 AM       Review of Systems   Constitutional: Negative for chills, fever and malaise/fatigue. HENT: Negative for congestion, ear pain, sore throat and tinnitus. Eyes: Negative for blurred vision, double vision, pain and discharge. Respiratory: Negative for cough, shortness of breath and wheezing.     Cardiovascular: Negative for chest pain, palpitations and leg swelling. Gastrointestinal: Negative for abdominal pain, blood in stool, constipation, diarrhea, nausea and vomiting. Genitourinary: Negative for dysuria, frequency, hematuria and urgency. Musculoskeletal: Negative for back pain, joint pain and myalgias. Skin: Negative for rash. Neurological: Negative for dizziness, tremors, seizures and headaches. Endo/Heme/Allergies: Negative for polydipsia. Does not bruise/bleed easily. Psychiatric/Behavioral: Negative for depression and substance abuse. The patient is not nervous/anxious. Physical Exam  Vitals signs and nursing note reviewed. Constitutional:       Appearance: She is well-developed. She is not diaphoretic. HENT:      Head: Normocephalic and atraumatic. Right Ear: External ear normal.      Mouth/Throat:      Pharynx: No oropharyngeal exudate. Eyes:      General: No scleral icterus. Conjunctiva/sclera: Conjunctivae normal.      Pupils: Pupils are equal, round, and reactive to light. Neck:      Musculoskeletal: Normal range of motion and neck supple. Thyroid: No thyromegaly. Vascular: No JVD. Cardiovascular:      Rate and Rhythm: Normal rate and regular rhythm. Heart sounds: Normal heart sounds. No murmur. Pulmonary:      Effort: Pulmonary effort is normal.      Breath sounds: Normal breath sounds. No wheezing. Abdominal:      General: Bowel sounds are normal. There is no distension. Palpations: Abdomen is soft. There is no mass. Musculoskeletal: Normal range of motion. General: No tenderness. Lymphadenopathy:      Cervical: No cervical adenopathy. Skin:     General: Skin is warm and dry. Findings: No rash. Neurological:      Mental Status: She is alert and oriented to person, place, and time. Cranial Nerves: No cranial nerve deficit. Deep Tendon Reflexes: Reflexes are normal and symmetric.  Reflexes normal.         ASSESSMENT and PLAN  Diagnoses and all orders for this visit:    1. Hypothyroidism, adult  -     TSH 3RD GENERATION; Future  -     T4, FREE; Future    2. Dyslipidemia  -     LIPID PANEL; Future  -     METABOLIC PANEL, COMPREHENSIVE; Future    3. Vitamin D deficiency    4. Screen for colon cancer  -     OCCULT BLOOD IMMUNOASSAY,DIAGNOSTIC; Future      Discussed lifestyle issues and health guidance given  Patient was given an after visit summary which includes diagnoses, vital signs, current medications, instructions and references & authorized prescriptions . Results of labs will be conveyed to patient, once available. Pt verbalized instructions I provided and expressed understanding of discussion that was held today. Follow-up and Dispositions    · Return in about 6 months (around 6/18/2021) for follow up, fasting.

## 2020-12-19 LAB
ALBUMIN SERPL-MCNC: 4 G/DL (ref 3.8–4.9)
ALBUMIN/GLOB SERPL: 1.3 {RATIO} (ref 1.2–2.2)
ALP SERPL-CCNC: 102 IU/L (ref 39–117)
ALT SERPL-CCNC: 7 IU/L (ref 0–32)
AST SERPL-CCNC: 10 IU/L (ref 0–40)
BILIRUB SERPL-MCNC: 1 MG/DL (ref 0–1.2)
BUN SERPL-MCNC: 7 MG/DL (ref 6–24)
BUN/CREAT SERPL: 11 (ref 9–23)
CALCIUM SERPL-MCNC: 9 MG/DL (ref 8.7–10.2)
CHLORIDE SERPL-SCNC: 107 MMOL/L (ref 96–106)
CHOLEST SERPL-MCNC: 213 MG/DL (ref 100–199)
CO2 SERPL-SCNC: 27 MMOL/L (ref 20–29)
CREAT SERPL-MCNC: 0.65 MG/DL (ref 0.57–1)
GLOBULIN SER CALC-MCNC: 3 G/DL (ref 1.5–4.5)
GLUCOSE SERPL-MCNC: 85 MG/DL (ref 65–99)
HDLC SERPL-MCNC: 56 MG/DL
INTERPRETATION, 910389: NORMAL
LDLC SERPL CALC-MCNC: 144 MG/DL (ref 0–99)
POTASSIUM SERPL-SCNC: 4.5 MMOL/L (ref 3.5–5.2)
PROT SERPL-MCNC: 7 G/DL (ref 6–8.5)
SODIUM SERPL-SCNC: 142 MMOL/L (ref 134–144)
T4 FREE SERPL-MCNC: 1.23 NG/DL (ref 0.82–1.77)
TRIGL SERPL-MCNC: 71 MG/DL (ref 0–149)
TSH SERPL DL<=0.005 MIU/L-ACNC: 3.34 UIU/ML (ref 0.45–4.5)
VLDLC SERPL CALC-MCNC: 13 MG/DL (ref 5–40)

## 2020-12-20 NOTE — PROGRESS NOTES
Please inform patient,  All results including cholesterol, thyroid,kidney,liver are very reassuring  thanks

## 2021-06-17 ENCOUNTER — OFFICE VISIT (OUTPATIENT)
Dept: FAMILY MEDICINE CLINIC | Age: 56
End: 2021-06-17
Payer: COMMERCIAL

## 2021-06-17 VITALS
DIASTOLIC BLOOD PRESSURE: 72 MMHG | HEART RATE: 67 BPM | TEMPERATURE: 97.7 F | HEIGHT: 72 IN | RESPIRATION RATE: 16 BRPM | OXYGEN SATURATION: 100 % | SYSTOLIC BLOOD PRESSURE: 130 MMHG | BODY MASS INDEX: 33.86 KG/M2 | WEIGHT: 250 LBS

## 2021-06-17 DIAGNOSIS — Z86.000 HISTORY OF DUCTAL CARCINOMA IN SITU (DCIS) OF BREAST: ICD-10-CM

## 2021-06-17 DIAGNOSIS — Z00.00 ROUTINE GENERAL MEDICAL EXAMINATION AT A HEALTH CARE FACILITY: Primary | ICD-10-CM

## 2021-06-17 DIAGNOSIS — E55.9 VITAMIN D DEFICIENCY: ICD-10-CM

## 2021-06-17 DIAGNOSIS — Z12.31 SCREENING MAMMOGRAM FOR HIGH-RISK PATIENT: ICD-10-CM

## 2021-06-17 DIAGNOSIS — Z12.11 COLON CANCER SCREENING: ICD-10-CM

## 2021-06-17 DIAGNOSIS — E66.9 OBESITY (BMI 30.0-34.9): ICD-10-CM

## 2021-06-17 PROCEDURE — 99396 PREV VISIT EST AGE 40-64: CPT | Performed by: FAMILY MEDICINE

## 2021-06-17 NOTE — PROGRESS NOTES
Chief Complaint   Patient presents with    Follow Up Chronic Condition     6 months follow up       1. Have you been to the ER, urgent care clinic since your last visit? Hospitalized since your last visit? No    2. Have you seen or consulted any other health care providers outside of the [address] [place] [address] since your last visit? Include any pap smears or colon screening.  No        3 most recent PHQ Screens 6/17/2021   Little interest or pleasure in doing things Not at all   Feeling down, depressed, irritable, or hopeless Not at all   Total Score PHQ 2 0       Health Maintenance Due   Topic Date Due    DTaP/Tdap/Td series (1 - Tdap) Never done    Shingrix Vaccine Age 50> (1 of 2) Never done    Colorectal Cancer Screening Combo  10/03/2020    PAP AKA CERVICAL CYTOLOGY  12/18/2020

## 2021-06-17 NOTE — PROGRESS NOTES
HISTORY OF PRESENT ILLNESS  Eyad Delgado is a 60 y.o. female. Patient was seen today for complete physical checkup and age-appropriate blood work. She just had follow-up with GYN and had complete GYN checkup. H/o DCIS and s/p lumpectomy  Follow up mammogram in 08/2020 was very reassuring  HPI  Health Maintenance  Immunizations:   Influenza: n/a. Tetanus: unknown, record requested. Shingles: patient declined. Pneumonia: n/a. Cancer screening:   Cervical: reviewed guidelines, UTD, done by OBGYN, records requested. Breast: reviewed guidelines, up to date. Colon: reviewed guidelines, reviewed screening modalities & elected for FOBT. Cardiovascular Review  The patient has hyperlipidemia.  She reports taking medications as instructed, no medication side effects noted, no TIA's, no chest pain on exertion, no dyspnea on exertion, noting swelling of ankles, no orthostatic dizziness or lightheadedness, no orthopnea or paroxysmal nocturnal dyspnea, no palpitations, no intermittent claudication symptoms.  Diet and Lifestyle: generally follows a low fat low cholesterol diet, generally follows a low sodium diet, exercises sporadically, nonsmoker.  Lab review: labs reviewed and discussed with patient.    Medicines:again stopped her Simvastatin 20 mg     Patient Care Team:  Vikas Dillon MD as PCP - General (Family Medicine)  Vikas Dillno MD as PCP - [address] [place] [address] Provider  Rodrigo Knott MD (Orthopedic Surgery)  Ulysses Mcalpine., MD (Breast Surgery)  Shaquille Cottrell MD (Obstetrics & Gynecology)      The following sections were reviewed & updated as appropriate: PMH, PSH, FH, and SH. Review of Systems   Constitutional: Negative for chills, fever and malaise/fatigue. HENT: Negative for congestion, ear pain, sore throat and tinnitus. Eyes: Negative for blurred vision, double vision, pain and discharge.    Respiratory: Negative for cough, shortness of breath and wheezing. Cardiovascular: Negative for chest pain, palpitations and leg swelling. Gastrointestinal: Negative for abdominal pain, blood in stool, constipation, diarrhea, nausea and vomiting. Genitourinary: Negative for dysuria, frequency, hematuria and urgency. Musculoskeletal: Negative for back pain, joint pain and myalgias. Skin: Negative for rash. Neurological: Negative for dizziness, tremors, seizures and headaches. Endo/Heme/Allergies: Negative for polydipsia. Does not bruise/bleed easily. Psychiatric/Behavioral: Negative for depression and substance abuse. The patient is not nervous/anxious. Physical Exam  Vitals and nursing note reviewed. Constitutional:       Appearance: She is well-developed. HENT:      Head: Normocephalic and atraumatic. Right Ear: External ear normal.      Left Ear: External ear normal.      Nose: Nose normal.   Eyes:      General: No scleral icterus. Conjunctiva/sclera: Conjunctivae normal.      Pupils: Pupils are equal, round, and reactive to light. Neck:      Thyroid: No thyromegaly. Vascular: No JVD. Cardiovascular:      Rate and Rhythm: Normal rate and regular rhythm. Heart sounds: Normal heart sounds. No murmur heard. No friction rub. No gallop. Pulmonary:      Effort: Pulmonary effort is normal.      Breath sounds: Normal breath sounds. No wheezing or rales. Chest:      Chest wall: No tenderness. Breasts: Breasts are symmetrical.         Right: No inverted nipple, mass, nipple discharge, skin change or tenderness. Left: No inverted nipple, mass, nipple discharge, skin change or tenderness. Abdominal:      General: Bowel sounds are normal. There is no distension. Palpations: Abdomen is soft. There is no mass. Tenderness: There is no abdominal tenderness. Musculoskeletal:         General: No tenderness. Normal range of motion. Cervical back: Normal range of motion and neck supple. Lymphadenopathy:      Cervical: No cervical adenopathy. Skin:     General: Skin is warm and dry. Findings: No rash. Neurological:      Mental Status: She is alert and oriented to person, place, and time. Cranial Nerves: No cranial nerve deficit. Deep Tendon Reflexes: Reflexes are normal and symmetric. Psychiatric:         Behavior: Behavior normal.         Thought Content: Thought content normal.         Judgment: Judgment normal.         ASSESSMENT and PLAN  Diagnoses and all orders for this visit:    1. Routine general medical examination at a health care facility  -     CBC WITH AUTOMATED DIFF; Future  -     HEMOGLOBIN A1C WITH EAG; Future  -     LIPID PANEL; Future  -     METABOLIC PANEL, COMPREHENSIVE; Future  -     TSH 3RD GENERATION; Future  -     URINALYSIS W/ REFLEX CULTURE; Future    2. Colon cancer screening  -     OCCULT BLOOD IMMUNOASSAY,DIAGNOSTIC; Future    3. Obesity (BMI 30.0-34. 9)  Assessment & Plan:   Stable. Discussed abnormal weight, ideal BMI and importance of diet and exercise to loose weight. Referral for exercise program was offered. Printed information about diet and lifestyle modification provided. 4. Vitamin D deficiency  -     VITAMIN D, 25 HYDROXY; Future    5. History of ductal carcinoma in situ (DCIS) of breast  -     [facility] MAMMO BI SCREENING INCL CAD; Future    6. Screening mammogram for high-risk patient  -     [facility] MAMMO BI SCREENING INCL CAD; Future    Discussed lifestyle issues and health guidance given  Patient was given an after visit summary which includes diagnoses, vital signs, current medications, instructions and references & authorized prescriptions . Results of labs will be conveyed to patient, once available. Pt verbalized instructions I provided and expressed understanding of discussion that was held today. Follow-up and Dispositions    · Return in about 6 months (around 12/17/2021) for follow up, fasting.

## 2021-06-17 NOTE — LETTER
NOTIFICATION RETURN TO WORK / SCHOOL    6/17/2021 9:45 AM    Ms. Houston Phelps  [address] [place] [address] [address] [phone]      To Whom It May Concern:    Houston Phelps is currently under the care of [facility]. She should be excused from work today, 06/17/21, she was seen in office. If there are questions or concerns please have the patient contact our office.         Sincerely,      Zenaida Pereira MD

## 2021-06-17 NOTE — PATIENT INSTRUCTIONS

## 2021-06-21 LAB
25(OH)D3+25(OH)D2 SERPL-MCNC: 28.3 NG/ML (ref 30–100)
ALBUMIN SERPL-MCNC: 3.8 G/DL (ref 3.8–4.9)
ALBUMIN/GLOB SERPL: 1.4 {RATIO} (ref 1.2–2.2)
ALP SERPL-CCNC: 88 IU/L (ref 48–121)
ALT SERPL-CCNC: 5 IU/L (ref 0–32)
APPEARANCE UR: CLEAR
AST SERPL-CCNC: 13 IU/L (ref 0–40)
BACTERIA #/AREA URNS HPF: NORMAL /[HPF]
BASOPHILS # BLD AUTO: 0 X10E3/UL (ref 0–0.2)
BASOPHILS NFR BLD AUTO: 1 %
BILIRUB SERPL-MCNC: 0.6 MG/DL (ref 0–1.2)
BILIRUB UR QL STRIP: NEGATIVE
BUN SERPL-MCNC: 11 MG/DL (ref 6–24)
BUN/CREAT SERPL: 13 (ref 9–23)
CALCIUM SERPL-MCNC: 9.2 MG/DL (ref 8.7–10.2)
CASTS URNS QL MICRO: NORMAL /LPF
CHLORIDE SERPL-SCNC: 108 MMOL/L (ref 96–106)
CHOLEST SERPL-MCNC: 220 MG/DL (ref 100–199)
CO2 SERPL-SCNC: 26 MMOL/L (ref 20–29)
COLOR UR: YELLOW
CREAT SERPL-MCNC: 0.83 MG/DL (ref 0.57–1)
EOSINOPHIL # BLD AUTO: 0.3 X10E3/UL (ref 0–0.4)
EOSINOPHIL NFR BLD AUTO: 8 %
EPI CELLS #/AREA URNS HPF: NORMAL /HPF (ref 0–10)
ERYTHROCYTE [DISTWIDTH] IN BLOOD BY AUTOMATED COUNT: 12.4 % (ref 11.7–15.4)
EST. AVERAGE GLUCOSE BLD GHB EST-MCNC: 105 MG/DL
GLOBULIN SER CALC-MCNC: 2.8 G/DL (ref 1.5–4.5)
GLUCOSE SERPL-MCNC: 90 MG/DL (ref 65–99)
GLUCOSE UR QL: NEGATIVE
HBA1C MFR BLD: 5.3 % (ref 4.8–5.6)
HCT VFR BLD AUTO: 40.1 % (ref 34–46.6)
HDLC SERPL-MCNC: 57 MG/DL
HGB BLD-MCNC: 13.7 G/DL (ref 11.1–15.9)
HGB UR QL STRIP: NEGATIVE
IMM GRANULOCYTES # BLD AUTO: 0 X10E3/UL (ref 0–0.1)
IMM GRANULOCYTES NFR BLD AUTO: 0 %
IMP & REVIEW OF LAB RESULTS: NORMAL
KETONES UR QL STRIP: NEGATIVE
LDLC SERPL CALC-MCNC: 152 MG/DL (ref 0–99)
LEUKOCYTE ESTERASE UR QL STRIP: NEGATIVE
LYMPHOCYTES # BLD AUTO: 1.5 X10E3/UL (ref 0.7–3.1)
LYMPHOCYTES NFR BLD AUTO: 38 %
MCH RBC QN AUTO: 32.3 PG (ref 26.6–33)
MCHC RBC AUTO-ENTMCNC: 34.2 G/DL (ref 31.5–35.7)
MCV RBC AUTO: 95 FL (ref 79–97)
MICRO URNS: ABNORMAL
MICRO URNS: ABNORMAL
MONOCYTES # BLD AUTO: 0.3 X10E3/UL (ref 0.1–0.9)
MONOCYTES NFR BLD AUTO: 9 %
NEUTROPHILS # BLD AUTO: 1.7 X10E3/UL (ref 1.4–7)
NEUTROPHILS NFR BLD AUTO: 44 %
NITRITE UR QL STRIP: NEGATIVE
PH UR STRIP: 8.5 [PH] (ref 5–7.5)
PLATELET # BLD AUTO: 223 X10E3/UL (ref 150–450)
POTASSIUM SERPL-SCNC: 4.9 MMOL/L (ref 3.5–5.2)
PROT SERPL-MCNC: 6.6 G/DL (ref 6–8.5)
PROT UR QL STRIP: NEGATIVE
RBC # BLD AUTO: 4.24 X10E6/UL (ref 3.77–5.28)
RBC #/AREA URNS HPF: NORMAL /HPF (ref 0–2)
SODIUM SERPL-SCNC: 144 MMOL/L (ref 134–144)
SP GR UR: 1.02 (ref 1–1.03)
TRIGL SERPL-MCNC: 62 MG/DL (ref 0–149)
TSH SERPL DL<=0.005 MIU/L-ACNC: 3.27 UIU/ML (ref 0.45–4.5)
URINALYSIS REFLEX, 377202: ABNORMAL
UROBILINOGEN UR STRIP-MCNC: 0.2 MG/DL (ref 0.2–1)
VLDLC SERPL CALC-MCNC: 11 MG/DL (ref 5–40)
WBC # BLD AUTO: 3.9 X10E3/UL (ref 3.4–10.8)
WBC #/AREA URNS HPF: NORMAL /HPF (ref 0–5)

## 2021-06-21 NOTE — PROGRESS NOTES
Please inform patient and send a letter,All results including blood count, kidney and liver function, urine analysis, diabetes screening, thyroid function, are very normal.Cholesterol numbers are showing borderline elevated bad cholesterol but at the same time her good cholesterol is also good, no need to be on medicine and keep watching diet. Vitamin D is borderline low at this time, to take OTC vitamin D3 2000 units daily. Overall stable results, other than taking OTC vitamin D3 no major recommendations. Thanks

## 2021-06-22 NOTE — PROGRESS NOTES
Called patient. Two patient identifiers verified. Discussed lab results. She Verbalized understanding.

## 2021-06-23 LAB
HEMOCCULT STL QL IA: NEGATIVE
SPECIMEN STATUS REPORT, ROLRST: NORMAL

## 2021-06-24 NOTE — PROGRESS NOTES
Called patient. Two patient identifiers verified. Informed that stool occult is normal. She Verbalized understanding.

## 2021-08-17 ENCOUNTER — TELEPHONE (OUTPATIENT)
Dept: FAMILY MEDICINE CLINIC | Age: 56
End: 2021-08-17

## 2021-08-17 DIAGNOSIS — Z86.000 HISTORY OF DUCTAL CARCINOMA IN SITU (DCIS) OF BREAST: Primary | ICD-10-CM

## 2021-08-17 NOTE — TELEPHONE ENCOUNTER
Attempted to call. Left message on secured voicemail to return call back.  Please transfer or provide more information regarding pt's mammogram orders

## 2021-08-17 NOTE — TELEPHONE ENCOUNTER
Returned call to Jamir Melendrez. Pt's anem and  verified. She stated that pt called to schedule an appointment and during screening questions. Pt answered yes for having a breast cancer within last 5 years.  So they are requesting new order for diagnostic mammogram.

## 2021-08-18 NOTE — TELEPHONE ENCOUNTER
Outbound call to [place] from scheduling. pt's Name and  verified. Informed that diagnostic mammogram is ordered. She stated they will get patient scheduled.

## 2021-09-01 ENCOUNTER — OFFICE VISIT (OUTPATIENT)
Dept: FAMILY MEDICINE CLINIC | Age: 56
End: 2021-09-01
Payer: COMMERCIAL

## 2021-09-01 VITALS
WEIGHT: 248.2 LBS | BODY MASS INDEX: 33.62 KG/M2 | RESPIRATION RATE: 16 BRPM | TEMPERATURE: 97.1 F | DIASTOLIC BLOOD PRESSURE: 78 MMHG | HEIGHT: 72 IN | SYSTOLIC BLOOD PRESSURE: 114 MMHG | OXYGEN SATURATION: 97 % | HEART RATE: 72 BPM

## 2021-09-01 DIAGNOSIS — H81.03 MENIERE DISEASE, BILATERAL: Primary | ICD-10-CM

## 2021-09-01 PROCEDURE — 99213 OFFICE O/P EST LOW 20 MIN: CPT | Performed by: FAMILY MEDICINE

## 2021-09-01 RX ORDER — HYDROCHLOROTHIAZIDE 12.5 MG/1
12.5 TABLET ORAL DAILY
Qty: 10 TABLET | Refills: 0 | Status: SHIPPED | OUTPATIENT
Start: 2021-09-01 | End: 2021-12-15 | Stop reason: ALTCHOICE

## 2021-09-01 RX ORDER — MECLIZINE HYDROCHLORIDE 25 MG/1
25 TABLET ORAL 2 TIMES DAILY
Qty: 20 TABLET | Refills: 0 | Status: SHIPPED | OUTPATIENT
Start: 2021-09-01 | End: 2021-09-11

## 2021-09-01 NOTE — PATIENT INSTRUCTIONS
Ménière's Disease: Care Instructions  Your Care Instructions     Ménière's (say \"men-YEERS\") disease is a problem of the inner ear that affects hearing and balance. It causes sudden attacks of vertigo that make you feel like you are spinning. It can also cause a loud ringing in the ears called tinnitus, a temporary loss of hearing, and a feeling of fullness in the ear. Your hearing loss may not get better. The cause of Ménière's disease is not known, but it may be related to a fluid imbalance in the inner ear. The goal of treatment is to make the vertigo less severe until the attack ends. Some people can prevent attacks by eating a diet low in sodium and by doing exercises to improve balance. Medicines may also help. Surgery is an option for some people. Follow-up care is a key part of your treatment and safety. Be sure to make and go to all appointments, and call your doctor if you are having problems. It's also a good idea to know your test results and keep a list of the medicines you take. How can you care for yourself at home? · During an attack of vertigo, lie down and hold your head very still until the feeling passes. This may help you cope with vertigo. · Take your medicines exactly as prescribed. Call your doctor if you think you are having a problem with your medicine. You will get more details on the specific medicines your doctor prescribes. · Avoid caffeine, alcohol, tobacco, and stress, along with any other substances or conditions that trigger an attack. · Eat a diet low in sodium to reduce fluid buildup in the inner ear. · Do exercises to improve your balance. These can help ease vertigo. Keep a chart to track your progress. It can make you aware of any improvement in your vertigo symptoms. Include things like the date, what exercises were done or how far you walked, and how you felt during each exercise.   ? Stand with your feet together, arms at your sides, and hold this position for 30 seconds. ? For slightly harder exercise, stand with your feet together and arms at your sides while slowly moving your head up and down and side to side. ? Walk 5 steps and then stop abruptly. Wait for any dizzy feeling to go away, and do it again. Repeat until you have walked about 50 feet. Walking exercises for vertigo may improve your balance and your symptoms of vertigo. You may want to have someone next to you while you do these exercises in case you lose your balance. ? For another walking exercise, walk 5 steps, turn, and walk back. Wait for any dizziness to go away. Repeat 5 more times. ? For harder exercise, walk and turn your head to the right or left with every other step. Try to walk about 50 feet. Repeat the exercise while moving your head up and down. Then repeat while moving your head up to one side and down to the other side, and then switching. · Make sure your home is safe for those times when you have an attack of vertigo. ? Get rid of throw rugs, and use nonskid mats. ? Use grab bars near the bathtub and toilet. ? Use night-lights. ? Keep floors dry to prevent slipping. ? Store items you use often on low shelves so you do not have to climb or reach high. If you have to climb, use a step stool with handrails. ? Keep driveways, sidewalks, and other walkways clear of things that might cause you to trip. · There are other steps you can take to stay safe. ? Avoid driving or working at heights. ? Wear shoes with low heels and nonslip soles. ? Keep your shoes tied. ? Alert family and friends to your condition. ? Know whether medicines you take can affect your sense of balance. When should you call for help? Call 911 anytime you think you may need emergency care. For example, call if:    · You passed out (lost consciousness).     · You have symptoms of a stroke.  These may include:  ? Sudden numbness, tingling, weakness, or loss of movement in your face, arm, or leg, especially on only one side of your body. ? Sudden vision changes. ? Sudden trouble speaking. ? Sudden confusion or trouble understanding simple statements. ? Sudden problems with walking or balance. ? A sudden, severe headache that is different from past headaches. Call your doctor now or seek immediate medical care if:    · You have new or worse dizziness.     · You notice changes in your hearing. Watch closely for changes in your health, and be sure to contact your doctor if:    · You have new or worse nausea or vomiting.     · Your vertigo gets worse.     · You do not get better as expected. Where can you learn more? Go to http://www.gray.com/  Enter Z422 in the search box to learn more about \"Ménière's Disease: Care Instructions. \"  Current as of: December 2, 2020               Content Version: 12.8  © 9401-0715 [employer]. Care instructions adapted under license by [employer] (which disclaims liability or warranty for this information). If you have questions about a medical condition or this instruction, always ask your healthcare professional. [address] any warranty or liability for your use of this information.

## 2021-09-01 NOTE — PROGRESS NOTES
HISTORY OF PRESENT ILLNESS  Ruth Page is a 60 y.o. female. Patient was seen today for sudden onset of decreased hearing both the ears with ringing in ears and nausea. HPI  URI Review  Adela Gould returns to clinic today to talk about: bilateral ear fullness, hearing loss, pressure, ringing in both ears and nausea and dizzy spells that started abrupt and 1 day ago, and is unchanged since that time. She reports symptoms of upper respiratory infection with sinus congestion and sore throat. She denies a history of: fever, headache, bilateral ear pain, discharge, itching, rhinorrhea, dry cough, productive cough, chest congestion, wheezing, SOB, PINTO, fatigue and myalgias. Treatments have included: none. Relevant PMH: No pertinent additional PMH. Patient reports sick contacts: no.      Review of Systems   Constitutional: Negative for chills, fever and malaise/fatigue. HENT: Positive for hearing loss and tinnitus. Negative for congestion, ear pain and sore throat. Eyes: Negative for blurred vision, double vision, pain and discharge. Respiratory: Negative for cough, shortness of breath and wheezing. Cardiovascular: Negative for chest pain, palpitations and leg swelling. Gastrointestinal: Positive for nausea. Negative for abdominal pain, blood in stool, constipation, diarrhea and vomiting. Genitourinary: Negative for dysuria, frequency, hematuria and urgency. Musculoskeletal: Negative for back pain, joint pain and myalgias. Skin: Negative for rash. Neurological: Negative for dizziness, tremors, seizures and headaches. Endo/Heme/Allergies: Negative for polydipsia. Does not bruise/bleed easily. Psychiatric/Behavioral: Negative for depression and substance abuse. The patient is not nervous/anxious. Physical Exam  Vitals and nursing note reviewed. Constitutional:       Appearance: She is well-developed. She is not diaphoretic. HENT:      Head: Normocephalic and atraumatic.       Right Ear: External ear normal.      Mouth/Throat:      Pharynx: No oropharyngeal exudate. Eyes:      General: No scleral icterus. Conjunctiva/sclera: Conjunctivae normal.      Pupils: Pupils are equal, round, and reactive to light. Neck:      Thyroid: No thyromegaly. Vascular: No JVD. Cardiovascular:      Rate and Rhythm: Normal rate and regular rhythm. Heart sounds: Normal heart sounds. No murmur heard. Pulmonary:      Effort: Pulmonary effort is normal.      Breath sounds: Normal breath sounds. No wheezing. Abdominal:      General: Bowel sounds are normal. There is no distension. Palpations: Abdomen is soft. There is no mass. Musculoskeletal:         General: No tenderness. Normal range of motion. Cervical back: Normal range of motion and neck supple. Lymphadenopathy:      Cervical: No cervical adenopathy. Skin:     General: Skin is warm and dry. Findings: No rash. Neurological:      Mental Status: She is alert and oriented to person, place, and time. Cranial Nerves: No cranial nerve deficit. Deep Tendon Reflexes: Reflexes are normal and symmetric. Reflexes normal.      Comments: Intact visual fields. Fundi are benign. PERRL, EOM's full, no nystagmus, no ptosis. Facial sensation is normal. Facial movement is symmetric. Palate is midline. Normal sternocleidomastoid strength. Tongue is midline. Hearing is intact bilaterally. ASSESSMENT and PLAN  Diagnoses and all orders for this visit:    1. Meniere disease, bilateral  -     hydroCHLOROthiazide (HYDRODIURIL) 12.5 mg tablet; Take 1 Tablet by mouth daily. -     meclizine (ANTIVERT) 25 mg tablet; Take 1 Tablet by mouth two (2) times a day for 10 days. Examined symptoms consistent with likely Ménière's disease. Patient was seen at [facility] urgent care yesterday and her blood pressure was borderline elevated.   Started on diuretic and antihistamine

## 2021-09-01 NOTE — LETTER
NOTIFICATION RETURN TO WORK     9/1/2021 9:04 AM    Ms. Lucy Rivera  [address] [place] [address] [address] [phone]      To Whom It May Concern:    Lucy Rivera is currently under the care of [facility]. She will return to work on: 09/07/21    If there are questions or concerns please have the patient contact our office.         Sincerely,      Vamsi Davila MD

## 2021-09-01 NOTE — PROGRESS NOTES
Chief Complaint   Patient presents with    Hearing Problem     can't hear x 1 day       1. Have you been to the ER, urgent care clinic since your last visit? Hospitalized since your last visit? No    2. Have you seen or consulted any other health care providers outside of the [address] [place] [address] since your last visit? Include any pap smears or colon screening.  No        3 most recent PHQ Screens 9/1/2021   Little interest or pleasure in doing things Not at all   Feeling down, depressed, irritable, or hopeless Not at all   Total Score PHQ 2 0       Health Maintenance Due   Topic Date Due    DTaP/Tdap/Td series (1 - Tdap) Never done    Shingrix Vaccine Age 50> (1 of 2) Never done    Flu Vaccine (1) 09/01/2021    Breast Cancer Screen Mammogram  08/06/2021

## 2021-11-02 ENCOUNTER — HOSPITAL ENCOUNTER (OUTPATIENT)
Dept: MAMMOGRAPHY | Age: 56
Discharge: HOME | End: 2021-11-02
Attending: FAMILY MEDICINE
Payer: COMMERCIAL

## 2021-11-02 DIAGNOSIS — Z86.000 HISTORY OF DUCTAL CARCINOMA IN SITU (DCIS) OF BREAST: ICD-10-CM

## 2021-11-02 PROCEDURE — 77062 BREAST TOMOSYNTHESIS BI: CPT

## 2021-12-15 ENCOUNTER — OFFICE VISIT (OUTPATIENT)
Dept: FAMILY MEDICINE CLINIC | Age: 56
End: 2021-12-15
Payer: COMMERCIAL

## 2021-12-15 VITALS
BODY MASS INDEX: 34.13 KG/M2 | SYSTOLIC BLOOD PRESSURE: 133 MMHG | HEIGHT: 72 IN | OXYGEN SATURATION: 99 % | WEIGHT: 252 LBS | TEMPERATURE: 97.9 F | DIASTOLIC BLOOD PRESSURE: 80 MMHG | HEART RATE: 66 BPM | RESPIRATION RATE: 20 BRPM

## 2021-12-15 DIAGNOSIS — Z86.000 HISTORY OF DUCTAL CARCINOMA IN SITU (DCIS) OF BREAST: ICD-10-CM

## 2021-12-15 DIAGNOSIS — Z71.84 TRAVEL ADVICE ENCOUNTER: ICD-10-CM

## 2021-12-15 DIAGNOSIS — E03.9 HYPOTHYROIDISM, ADULT: Primary | ICD-10-CM

## 2021-12-15 DIAGNOSIS — E78.5 DYSLIPIDEMIA: ICD-10-CM

## 2021-12-15 DIAGNOSIS — Z23 ENCOUNTER FOR IMMUNIZATION: ICD-10-CM

## 2021-12-15 PROCEDURE — 99213 OFFICE O/P EST LOW 20 MIN: CPT | Performed by: FAMILY MEDICINE

## 2021-12-15 PROCEDURE — 90686 IIV4 VACC NO PRSV 0.5 ML IM: CPT | Performed by: FAMILY MEDICINE

## 2021-12-15 PROCEDURE — 90471 IMMUNIZATION ADMIN: CPT | Performed by: FAMILY MEDICINE

## 2021-12-15 RX ORDER — MEFLOQUINE HYDROCHLORIDE 250 MG/1
250 TABLET ORAL
Qty: 6 TABLET | Refills: 0 | Status: SHIPPED | OUTPATIENT
Start: 2021-12-15 | End: 2022-01-20

## 2021-12-15 NOTE — PROGRESS NOTES
HISTORY OF PRESENT ILLNESS  Nikos Quinteros is a 60 y.o. female. Patient was seen today for 6 months follow-up appointment hypothyroidism and dyslipidemia. Also seen for medication prescription for upcoming travel to [place]. HPI   Cardiovascular Review  The patient has hyperlipidemia.  She reports taking medications as instructed, no medication side effects noted, no TIA's, no chest pain on exertion, no dyspnea on exertion, noting swelling of ankles, no orthostatic dizziness or lightheadedness, no orthopnea or paroxysmal nocturnal dyspnea, no palpitations, no intermittent claudication symptoms.  Diet and Lifestyle: generally follows a low fat low cholesterol diet, generally follows a low sodium diet, exercises sporadically, nonsmoker.  Lab review: labs reviewed and discussed with patient.    Medicines: Not on any medicine anymore     Lab Results   Component Value Date/Time    Cholesterol, total 220 (H) 06/17/2021 09:55 AM    HDL Cholesterol 57 06/17/2021 09:55 AM    LDL, calculated 152 (H) 06/17/2021 09:55 AM    LDL, calculated 113 (H) 06/30/2020 10:29 AM    VLDL, calculated 11 06/17/2021 09:55 AM    VLDL, calculated 12 06/30/2020 10:29 AM    Triglyceride 62 06/17/2021 09:55 AM          Endocrine Review  Patient is seen for followup of hypothyroidism.  Since last visit: She is persistently off from her levothyroxine.  She reports the following concerns/problems/med side effects: none.  Lab review: labs reviewed and discussed with patient. No more on medications  Lab Results   Component Value Date/Time    TSH 3.270 06/17/2021 09:55 AM    T4, Free 1.23 12/18/2020 12:00 AM     FOREIGN TRAVEL CLINIC ASSESSMENT                Date of Departure: 12/25/21         Date of return/duration of travel:  02/05/22         Countries to be visited, or transited, in exact chronological order:  [employer]    Travel is primarily:    [employer] (VFR).     No altitude Exposure      Past Medical History:  Past Medical History:   Diagnosis Date    Anemia     resolved after tx     Cancer ([facility] [address] 2019    RIGHT     H. PYLORI INFECTION     tx in  2008 no stool Ag to confirm tx    Hypercholesterolemia     Ill-defined condition     HIGH CHOLESTEROL - NO LONGER           If you are a woman, are you pregnant? No    Travel History:  Prior travel to other countries to which vaccines recommended/required? Yes  She had yellow fever vaccine in November 2018        Vaccination History:  Are routine childhood immunizations completed and up to date? Immunization History   Administered Date(s) Administered    COVID-19, PFIZER, MRNA, LNP-S, PF, 30MCG/0.3ML DOSE 03/13/2021, 04/22/2021    Influenza Vaccine (Quad) PF (>6 Mo Flulaval, Fluarix, and >3 Yrs Afluria, Fluzone [id number]) 10/10/2018, 10/21/2020, 12/15/2021    Influenza Vaccine Split 10/31/2012       Did you grow up in the [facility]? No    Prior travel vaccines received:  Yes        Vaccine indications, contraindications, risks, benefits, schedules, routes discussed with patient and questions, comments discussed. Review of Systems   Constitutional: Negative for chills, fever and malaise/fatigue. HENT: Negative for congestion, ear pain, sore throat and tinnitus. Eyes: Negative for blurred vision, double vision, pain and discharge. Respiratory: Negative for cough, shortness of breath and wheezing. Cardiovascular: Negative for chest pain, palpitations and leg swelling. Gastrointestinal: Negative for abdominal pain, blood in stool, constipation, diarrhea, nausea and vomiting. Genitourinary: Negative for dysuria, frequency, hematuria and urgency. Musculoskeletal: Negative for back pain, joint pain and myalgias. Skin: Negative for rash. Neurological: Negative for dizziness, tremors, seizures and headaches. Endo/Heme/Allergies: Negative for polydipsia. Does not bruise/bleed easily.    Psychiatric/Behavioral: Negative for depression and substance abuse. The patient is not nervous/anxious. Physical Exam  Vitals and nursing note reviewed. Constitutional:       Appearance: She is well-developed. She is not diaphoretic. HENT:      Head: Normocephalic and atraumatic. Right Ear: External ear normal.      Mouth/Throat:      Pharynx: No oropharyngeal exudate. Eyes:      General: No scleral icterus. Conjunctiva/sclera: Conjunctivae normal.      Pupils: Pupils are equal, round, and reactive to light. Neck:      Thyroid: No thyromegaly. Vascular: No JVD. Cardiovascular:      Rate and Rhythm: Normal rate and regular rhythm. Heart sounds: Normal heart sounds. No murmur heard. Pulmonary:      Effort: Pulmonary effort is normal.      Breath sounds: Normal breath sounds. No wheezing. Abdominal:      General: Bowel sounds are normal. There is no distension. Palpations: Abdomen is soft. There is no mass. Musculoskeletal:         General: No tenderness. Normal range of motion. Cervical back: Normal range of motion and neck supple. Lymphadenopathy:      Cervical: No cervical adenopathy. Skin:     General: Skin is warm and dry. Findings: No rash. Neurological:      Mental Status: She is alert and oriented to person, place, and time. Cranial Nerves: No cranial nerve deficit. Deep Tendon Reflexes: Reflexes are normal and symmetric. Reflexes normal.         ASSESSMENT and PLAN  Diagnoses and all orders for this visit:    1. Hypothyroidism, adult  -     TSH 3RD GENERATION; Future    2. History of ductal carcinoma in situ (DCIS) of breast  Assessment & Plan:   Recent mammogram within normal.      3. Dyslipidemia  -     LIPID PANEL; Future    4. Encounter for immunization  -     INFLUENZA VIRUS VAC QUAD,SPLIT,PRESV FREE SYRINGE IM    5. Travel advice encounter  -     mefloquine (LARIAM) 250 mg tablet; Take 1 Tablet by mouth every seven (7) days for 6 doses.  Take with food and at least 8 ounces of water    Discussed lifestyle issues and health guidance given  Patient was given an after visit summary which includes diagnoses, vital signs, current medications, instructions and references & authorized prescriptions . Results of labs will be conveyed to patient, once available. Pt verbalized instructions I provided and expressed understanding of discussion that was held today. Follow-up and Dispositions    · Return in about 6 months (around 6/15/2022) for follow up, fasting. Please note that this dictation was completed with [employer], the computer voice recognition software. Quite often unanticipated grammatical, syntax, homophones, and other interpretive errors are inadvertently transcribed by the computer software. Please disregard these errors. Please excuse any errors that have escaped final proofreading. Thank you.

## 2021-12-15 NOTE — PATIENT INSTRUCTIONS
Learning About Healthy Travel Abroad  How can you stay healthy on your trip? The best way to stay healthy on your trip is to plan ahead. Talk with your doctor several months before you travel to another country. It's important to allow enough time to get the vaccine doses that you need. For example, if you need the hepatitis A vaccine, you'll need 2 doses spaced at least 6 months apart. Also ask your doctor if there are medicines or extra safety steps that you should take. Check with your local health department or travel health clinic for other travel tips. What can you do to prevent health problems? Get needed vaccines  · Make sure you are up to date with your routine shots. They can protect you from diseases such as polio, diphtheria, and measles. These diseases are still a problem in some developing countries. · Get other vaccines you need. Your doctor or a health clinic can tell you which ones you need for your travels. Here are some examples:  ? Hepatitis A vaccine, if you travel to developing countries. ? Yellow fever vaccine, if you visit places in [place] and [place] where the disease is active. ? Typhoid fever vaccine, if you travel to [facility] and [place], [place], or some areas of [place]. Bring medicines with you  · If you take medicines, bring a supply that will last the length of your trip. Get a letter from your doctor that lists your medical conditions and the medicines you take. Bring prescriptions for refills if you will be gone for a long time. Also bring any medical supplies you may need such as blood sugar testing supplies or insulin needles. · If you are going to an area where malaria is a risk, ask your doctor or health clinic for a prescription to help prevent infection. This medicine works best if you take it before, during, and after your trip. · You may want to bring medicine for traveler's diarrhea.  Over-the-counter medicines include:  ? Bismuth subsalicylate (Pepto-Bismol). ? Loperamide (Imodium). Your doctor may also prescribe an antibiotic to take with you. If so, take it as directed. This can treat diarrhea if you're going to an area where modern medical care isn't readily available. Make safer choices as you travel  · Practice safer sex. Using condoms can prevent sexually transmitted infections. · In areas where mosquito-borne illnesses are found, use DEET insect repellent. Wear long pants and long-sleeved shirts. Use mosquito netting to protect yourself from bites while you sleep. · Many developing countries don't have safe tap water. Only have drinks made with boiled water, such as tea and coffee. Canned or bottled carbonated drinks, such as soda, beer, wine, or water, are usually safe. Don't use ice if you don't know what kind of water was used to make it. And don't use tap water to brush your teeth. · Be aware that you could be injured in cars, boats, or public transportation. Driving can be dangerous due to bad roads, poor  training, and crowded roadways. Always wear your seat belt if available. If you hire a  or taxi, ask the  to slow down or drive more carefully if you feel unsafe. · Air pollution in some large cities can be a problem if you have asthma or other breathing problems. Avoid those cities when air quality is poor. Or stay indoors as much as possible. · Be careful around dogs and other animals. Dogs in developing countries are often not tame and may bite. Rabies is more common in tropical and subtropical regions. · If you're going to a place that's much higher above sea level than you're used to, ask your doctor how to avoid altitude sickness. The doctor may also prescribe medicine to help treat it. Where can you get the best information? · Use the Internet to find travel health information. Try these websites:  ? www.cdc.gov/travel.  This website is for the Centers for Disease Control and Prevention (CDC).  ? www.who.int/ith/en. This website lists information from the [address]) on travel, required immunizations, and disease outbreaks. · Find out where you can get the best medical care in the region you are visiting. See the [address] [place] [address] [employer] [place]'s website at www.[place].gov. It lists every [place] embassy worldwide. It also lists some doctors and medical facilities in those countries. · Take along the phone numbers and addresses of embassies in the areas you will visit. They can help you find a doctor or hospital. Find out if your insurance company will cover you. You may want to get special travel health insurance. · If you are taking a cruise, you can find your ship's health record on this website: www.cdc.gov/nceh/vsp. Where can you learn more? Go to http://www.gray.com/  Enter R129 in the search box to learn more about \"Learning About Healthy Travel Abroad. \"  Current as of: February 18, 2021               Content Version: 13.0  © 5233-1000 Healthwise, Incorporated. Care instructions adapted under license by [employer] (which disclaims liability or warranty for this information). If you have questions about a medical condition or this instruction, always ask your healthcare professional. [address] any warranty or liability for your use of this information.

## 2021-12-15 NOTE — PROGRESS NOTES
Identified pt with two pt identifiers(name and ). Chief Complaint   Patient presents with    Thyroid Problem    Cholesterol Problem      Vitals:    12/15/21 0933   BP: 133/80   Pulse: 66   Resp: 20   Temp: 97.9 °F (36.6 °C)   SpO2: 99%   Weight: 252 lb (114.3 kg)   Height: 6' 4\" (1.93 m)   PainSc:   0 - No pain      Health Maintenance Due   Topic    DTaP/Tdap/Td series (1 - Tdap)    Cervical cancer screen     Shingrix Vaccine Age 50> (1 of 2)    Flu Vaccine (1)    COVID-19 Vaccine (3 - Booster for Kiser Peter series)       Depression Screening:  :     3 most recent PHQ Screens 12/15/2021   Little interest or pleasure in doing things Not at all   Feeling down, depressed, irritable, or hopeless Not at all   Total Score PHQ 2 0        Fall Risk Assessment:  :     Fall Risk Assessment, last 12 mths 2018   Able to walk? Yes   Fall in past 12 months? Yes   Number of falls in past 12 months 1   Fall with injury? 1       Abuse Screening:  :     Abuse Screening Questionnaire 2020   Do you ever feel afraid of your partner? N   Are you in a relationship with someone who physically or mentally threatens you? N   Is it safe for you to go home? Y        Coordination of Care Questionnaire:  :     1. Have you been to the ER, urgent care clinic since your last visit? Hospitalized since your last visit? No    2. Have you seen or consulted any other health care providers outside of the [address] [place] [address] since your last visit? Include any pap smears or colon screening.    No

## 2021-12-16 LAB
CHOLEST SERPL-MCNC: 239 MG/DL (ref 100–199)
HDLC SERPL-MCNC: 57 MG/DL
IMP & REVIEW OF LAB RESULTS: NORMAL
LDLC SERPL CALC-MCNC: 170 MG/DL (ref 0–99)
TRIGL SERPL-MCNC: 73 MG/DL (ref 0–149)
TSH SERPL DL<=0.005 MIU/L-ACNC: 3.4 UIU/ML (ref 0.45–4.5)
VLDLC SERPL CALC-MCNC: 12 MG/DL (ref 5–40)

## 2021-12-16 RX ORDER — SIMVASTATIN 10 MG/1
10 TABLET, FILM COATED ORAL
Qty: 90 TABLET | Refills: 1 | Status: SHIPPED | OUTPATIENT
Start: 2021-12-16 | End: 2022-06-20 | Stop reason: SDUPTHER

## 2021-12-16 NOTE — PROGRESS NOTES
Please inform patient,Cholesterol results are highly abnormal.  Her cholesterol medicine simvastatin was stopped as she was persistently doing good without medicine, now she will need to be on medicine again. Prescription of simvastatin has been sent to pharmacy. Thyroid function is persistently normal without medication. No need to start her thyroid medicine.   Follow-up in 6 monthsThanks

## 2021-12-17 NOTE — PROGRESS NOTES
Called patient, name and  verified. Patient informed of lab results and recommendations per provider. Patient verbalized understanding. Has no questions for provider at this time, 6 months appointment scheduled for patient.

## 2021-12-22 ENCOUNTER — TELEPHONE (OUTPATIENT)
Dept: FAMILY MEDICINE CLINIC | Age: 56
End: 2021-12-22

## 2021-12-22 NOTE — TELEPHONE ENCOUNTER
Pt has questions on medication.  She may have taken dosage wrong mefloquine (LARIAM) 250 mg tablet   [facility] [phone]

## 2021-12-22 NOTE — TELEPHONE ENCOUNTER
Called, spoke to pt. Two pt identifiers confirmed. Writer informed patient to take every 7 days Example: every Sunday and thom it out. Take times 6 Sundays and then she is finish.

## 2022-03-19 PROBLEM — E66.811 OBESITY (BMI 30.0-34.9): Status: ACTIVE | Noted: 2020-06-30

## 2022-03-19 PROBLEM — E66.9 OBESITY (BMI 30.0-34.9): Status: ACTIVE | Noted: 2020-06-30

## 2022-03-19 PROBLEM — Z86.000 HISTORY OF DUCTAL CARCINOMA IN SITU (DCIS) OF BREAST: Status: ACTIVE | Noted: 2019-06-26

## 2022-03-19 PROBLEM — K21.9 GASTROESOPHAGEAL REFLUX DISEASE WITHOUT ESOPHAGITIS: Status: ACTIVE | Noted: 2018-06-07

## 2022-06-15 ENCOUNTER — OFFICE VISIT (OUTPATIENT)
Dept: FAMILY MEDICINE CLINIC | Age: 57
End: 2022-06-15
Payer: COMMERCIAL

## 2022-06-15 VITALS
HEART RATE: 71 BPM | OXYGEN SATURATION: 98 % | SYSTOLIC BLOOD PRESSURE: 122 MMHG | HEIGHT: 72 IN | RESPIRATION RATE: 16 BRPM | TEMPERATURE: 97.6 F | DIASTOLIC BLOOD PRESSURE: 80 MMHG | BODY MASS INDEX: 33.59 KG/M2 | WEIGHT: 248 LBS

## 2022-06-15 DIAGNOSIS — Z12.11 COLON CANCER SCREENING: ICD-10-CM

## 2022-06-15 DIAGNOSIS — M19.012 PRIMARY OSTEOARTHRITIS OF LEFT SHOULDER: ICD-10-CM

## 2022-06-15 DIAGNOSIS — Z00.00 ROUTINE GENERAL MEDICAL EXAMINATION AT A HEALTH CARE FACILITY: Primary | ICD-10-CM

## 2022-06-15 PROCEDURE — 99396 PREV VISIT EST AGE 40-64: CPT | Performed by: FAMILY MEDICINE

## 2022-06-15 RX ORDER — MELOXICAM 15 MG/1
15 TABLET ORAL DAILY
Qty: 30 TABLET | Refills: 1 | Status: SHIPPED | OUTPATIENT
Start: 2022-06-15 | End: 2022-08-25 | Stop reason: SDUPTHER

## 2022-06-15 NOTE — PROGRESS NOTES
HISTORY OF PRESENT ILLNESS  Lincoln Morejon is a 59 y.o. female. Patient was seen today for routine annual physical checkup and to update her health maintenance. She is concerned about pain of left shoulder. HPI  Health Maintenance  Immunizations:   Influenza: up to date. Tetanus: unknown, record requested. Shingles: patient declined. Pneumonia: n/a. Cancer screening:   Cervical: reviewed guidelines, UTD, done by OBGYN, records requested. She just had her GYN checkup last week. Breast: reviewed guidelines, up to date. Colon: reviewed guidelines, reviewed screening modalities & elected for FOBT. Shoulder Pain  Patient complains of left side shoulder pain. The symptoms began several weeks ago Course of symptoms since onset has been gradually worsening. Pain is described as overall severity = moderate, location: glenohumeral region, worse with overhead movements and other associated symptoms: pain radiating to arm/hand. Symptoms were incited by no known event. Patient denies fever, previous history of shoulder procedure or conditions. Therapy to date includes none. Patient Care Team:  Romy Stark MD as PCP - General (Family Medicine)  Romy Stark MD as PCP - [facility]  Blas Healy MD (Orthopedic Surgery)  Katt Geiger MD (Surgery General)  Lito Medina MD (Obstetrics & Gynecology)      The following sections were reviewed & updated as appropriate: PMH, PSH, FH, and SH. Review of Systems   Constitutional: Negative for chills, fever and malaise/fatigue. HENT: Negative for congestion, ear pain, sore throat and tinnitus. Eyes: Negative for blurred vision, double vision, pain and discharge. Respiratory: Negative for cough, shortness of breath and wheezing. Cardiovascular: Negative for chest pain, palpitations and leg swelling.    Gastrointestinal: Negative for abdominal pain, blood in stool, constipation, diarrhea, nausea and vomiting. Genitourinary: Negative for dysuria, frequency, hematuria and urgency. Musculoskeletal: Negative for back pain, joint pain and myalgias. Left shoulder pain   Skin: Negative for rash. Neurological: Negative for dizziness, tremors, seizures and headaches. Endo/Heme/Allergies: Negative for polydipsia. Does not bruise/bleed easily. Psychiatric/Behavioral: Negative for depression and substance abuse. The patient is not nervous/anxious. Physical Exam  Vitals and nursing note reviewed. Constitutional:       Appearance: She is well-developed. She is not diaphoretic. HENT:      Head: Normocephalic and atraumatic. Right Ear: External ear normal.      Mouth/Throat:      Pharynx: No oropharyngeal exudate. Eyes:      General: No scleral icterus. Conjunctiva/sclera: Conjunctivae normal.      Pupils: Pupils are equal, round, and reactive to light. Neck:      Thyroid: No thyromegaly. Vascular: No JVD. Cardiovascular:      Rate and Rhythm: Normal rate and regular rhythm. Heart sounds: Normal heart sounds. No murmur heard. Pulmonary:      Effort: Pulmonary effort is normal.      Breath sounds: Normal breath sounds. No wheezing. Abdominal:      General: Bowel sounds are normal. There is no distension. Palpations: Abdomen is soft. There is no mass. Musculoskeletal:         General: Normal range of motion. Left shoulder: Tenderness present. No swelling or crepitus. Normal strength. Cervical back: Normal range of motion and neck supple. Comments: Left Shoulder- Empty Can Test: Positive. Drop Arm Test: Negative. Cross-Chest Test: Positive. NEER test Positive  Kennedy' test Positive     Lymphadenopathy:      Cervical: No cervical adenopathy. Skin:     General: Skin is warm and dry. Findings: No rash. Neurological:      Mental Status: She is alert and oriented to person, place, and time.       Cranial Nerves: No cranial nerve deficit. Deep Tendon Reflexes: Reflexes are normal and symmetric. Reflexes normal.         ASSESSMENT and PLAN  Diagnoses and all orders for this visit:    1. Routine general medical examination at a health care facility  -     CBC WITH AUTOMATED DIFF; Future  -     HEMOGLOBIN A1C WITH EAG; Future  -     LIPID PANEL; Future  -     METABOLIC PANEL, COMPREHENSIVE; Future  -     TSH 3RD GENERATION; Future  -     URINALYSIS W/ RFLX MICROSCOPIC; Future    2. Colon cancer screening  -     POCT FECAL IMMUNOCHEMICAL TEST (FIT)    3. Primary osteoarthritis of left shoulder  -     meloxicam (MOBIC) 15 mg tablet; Take 1 Tablet by mouth daily. Shoulder pain likely from shoulder arthritis. Discussed exercises and started on NSAID. Will do age-appropriate blood work. Discussed lifestyle issues and health guidance given  Patient was given an after visit summary which includes diagnoses, vital signs, current medications, instructions and references & authorized prescriptions . Results of labs will be conveyed to patient, once available. Pt verbalized instructions I provided and expressed understanding of discussion that was held today. Follow-up and Dispositions    · Return in about 6 months (around 12/15/2022) for follow up, fasting. Please note that this dictation was completed with [employer], the [employer] voice recognition software. Quite often unanticipated grammatical, syntax, homophones, and other interpretive errors are inadvertently transcribed by the computer software. Please disregard these errors. Please excuse any errors that have escaped final proofreading. Thank you.

## 2022-06-15 NOTE — PATIENT INSTRUCTIONS
Shoulder Arthritis: Exercises  Introduction  Here are some examples of exercises for you to try. The exercises may be suggested for a condition or for rehabilitation. Start each exercise slowly. Ease off the exercises if you start to have pain. You will be told when to start these exercises and which ones will work best for you. How to do the exercises  Shoulder flexion (lying down)    To make a wand for this exercise, use a piece of PVC pipe or a broom handle with the broom removed. Make the wand about a foot wider than your shoulders. 1. Lie on your back, holding a wand with both hands. Your palms should face down as you hold the wand. 2. Keeping your elbows straight, slowly raise your arms over your head. Raise them until you feel a stretch in your shoulders, upper back, and chest.  3. Hold for 15 to 30 seconds. 4. Repeat 2 to 4 times. Shoulder rotation (lying down)    To make a wand for this exercise, use a piece of PVC pipe or a broom handle with the broom removed. Make the wand about a foot wider than your shoulders. 1. Lie on your back. Hold a wand with both hands with your elbows bent and palms up. 2. Keep your elbows close to your body, and move the wand across your body toward the sore arm. 3. Hold for 8 to 12 seconds. 4. Repeat 2 to 4 times. Shoulder internal rotation with towel    1. Hold a towel above and behind your head with the arm that is not sore. 2. With your sore arm, reach behind your back and grasp the towel. 3. With the arm above your head, pull the towel upward. Do this until you feel a stretch on the front and outside of your sore shoulder. 4. Hold 15 to 30 seconds. 5. Repeat 2 to 4 times. Shoulder blade squeeze    1. Stand with your arms at your sides, and squeeze your shoulder blades together. Do not raise your shoulders up as you squeeze. 2. Hold 6 seconds. 3. Repeat 8 to 12 times.   Resisted rows    For this exercise, you will need elastic exercise material, such as surgical tubing or Thera-Band. 1. Put the band around a solid object at about waist level. (A bedpost will work well.) Each hand should hold an end of the band. 2. With your elbows at your sides and bent to 90 degrees, pull the band back. Your shoulder blades should move toward each other. Return to the starting position. 3. Repeat 8 to 12 times. External rotator strengthening exercise    1. Start by tying a piece of elastic exercise material to a doorknob. You can use surgical tubing or Thera-Band. (You may also hold one end of the band in each hand.)  2. Stand or sit with your shoulder relaxed and your elbow bent 90 degrees. Your upper arm should rest comfortably against your side. Squeeze a rolled towel between your elbow and your body for comfort. This will help keep your arm at your side. 3. Hold one end of the elastic band with the hand of the painful arm. 4. Start with your forearm across your belly. Slowly rotate the forearm out away from your body. Keep your elbow and upper arm tucked against the towel roll or the side of your body until you begin to feel tightness in your shoulder. Slowly move your arm back to where you started. 5. Repeat 8 to 12 times. Internal rotator strengthening exercise    1. Start by tying a piece of elastic exercise material to a doorknob. You can use surgical tubing or Thera-Band. 2. Stand or sit with your shoulder relaxed and your elbow bent 90 degrees. Your upper arm should rest comfortably against your side. Squeeze a rolled towel between your elbow and your body for comfort. This will help keep your arm at your side. 3. Hold one end of the elastic band in the hand of the painful arm. 4. Slowly rotate your forearm toward your body until it touches your belly. Slowly move it back to where you started. 5. Keep your elbow and upper arm firmly tucked against the towel roll or at your side. 6. Repeat 8 to 12 times.   Pendulum swing    If you have pain in your back, do not do this exercise. 1. Hold on to a table or the back of a chair with your good arm. Then bend forward a little and let your sore arm hang straight down. This exercise does not use the arm muscles. Rather, use your legs and your hips to create movement that makes your arm swing freely. 2. Use the movement from your hips and legs to guide the slightly swinging arm back and forth like a pendulum (or elephant trunk). Then guide it in circles that start small (about the size of a dinner plate). Make the circles a bit larger each day, as your pain allows. 3. Do this exercise for 5 minutes, 5 to 7 times each day. 4. As you have less pain, try bending over a little farther to do this exercise. This will increase the amount of movement at your shoulder. Follow-up care is a key part of your treatment and safety. Be sure to make and go to all appointments, and call your doctor if you are having problems. It's also a good idea to know your test results and keep a list of the medicines you take. Where can you learn more? Go to http://www.gray.com/  Enter H562 in the search box to learn more about \"Shoulder Arthritis: Exercises. \"  Current as of: July 1, 2021               Content Version: 13.2  © 2006-2022 [employer]. Care instructions adapted under license by [employer] (which disclaims liability or warranty for this information). If you have questions about a medical condition or this instruction, always ask your healthcare professional. [place] [address] any warranty or liability for your use of this information.

## 2022-06-15 NOTE — PROGRESS NOTES
Chief Complaint   Patient presents with    Cholesterol Problem     follow up    Thyroid Problem     follow up         1. \"Have you been to the ER, urgent care clinic since your last visit? Hospitalized since your last visit? \" No    2. \"Have you seen or consulted any other health care providers outside of the [address] [place] [address] since your last visit? \" Yes When: last week Reason for visit: follow up     3. For patients aged 39-70: Has the patient had a colonoscopy / FIT/ Cologuard? No      If the patient is female:    4. For patients aged 41-77: Has the patient had a mammogram within the past 2 years? No      5. For patients aged 21-65: Has the patient had a pap smear?  No         3 most recent PHQ Screens 6/15/2022   Little interest or pleasure in doing things Not at all   Feeling down, depressed, irritable, or hopeless -   Total Score PHQ 2 -       Health Maintenance Due   Topic Date Due    DTaP/Tdap/Td series (1 - Tdap) Never done    Cervical cancer screen  Never done    Shingrix Vaccine Age 50> (1 of 2) Never done    COVID-19 Vaccine (3 - Booster for Pfizer series) 09/22/2021    Colorectal Cancer Screening Combo  06/22/2022

## 2022-06-18 LAB
ALBUMIN SERPL-MCNC: 3.9 G/DL (ref 3.8–4.9)
ALBUMIN/GLOB SERPL: 1.4 {RATIO} (ref 1.2–2.2)
ALP SERPL-CCNC: 80 IU/L (ref 44–121)
ALT SERPL-CCNC: 9 IU/L (ref 0–32)
APPEARANCE UR: CLEAR
AST SERPL-CCNC: 10 IU/L (ref 0–40)
BASOPHILS # BLD AUTO: 0 X10E3/UL (ref 0–0.2)
BASOPHILS NFR BLD AUTO: 1 %
BILIRUB SERPL-MCNC: 0.7 MG/DL (ref 0–1.2)
BILIRUB UR QL STRIP: NEGATIVE
BUN SERPL-MCNC: 9 MG/DL (ref 6–24)
BUN/CREAT SERPL: 13 (ref 9–23)
CALCIUM SERPL-MCNC: 9.1 MG/DL (ref 8.7–10.2)
CHLORIDE SERPL-SCNC: 104 MMOL/L (ref 96–106)
CHOLEST SERPL-MCNC: 194 MG/DL (ref 100–199)
CO2 SERPL-SCNC: 26 MMOL/L (ref 20–29)
COLOR UR: YELLOW
CREAT SERPL-MCNC: 0.7 MG/DL (ref 0.57–1)
EGFR: 100 ML/MIN/1.73
EOSINOPHIL # BLD AUTO: 0.3 X10E3/UL (ref 0–0.4)
EOSINOPHIL NFR BLD AUTO: 7 %
ERYTHROCYTE [DISTWIDTH] IN BLOOD BY AUTOMATED COUNT: 12.1 % (ref 11.7–15.4)
EST. AVERAGE GLUCOSE BLD GHB EST-MCNC: 108 MG/DL
GLOBULIN SER CALC-MCNC: 2.8 G/DL (ref 1.5–4.5)
GLUCOSE SERPL-MCNC: 88 MG/DL (ref 65–99)
GLUCOSE UR QL STRIP: NEGATIVE
HBA1C MFR BLD: 5.4 % (ref 4.8–5.6)
HCT VFR BLD AUTO: 37.4 % (ref 34–46.6)
HDLC SERPL-MCNC: 48 MG/DL
HGB BLD-MCNC: 12.8 G/DL (ref 11.1–15.9)
HGB UR QL STRIP: NEGATIVE
IMM GRANULOCYTES # BLD AUTO: 0 X10E3/UL (ref 0–0.1)
IMM GRANULOCYTES NFR BLD AUTO: 0 %
IMP & REVIEW OF LAB RESULTS: NORMAL
KETONES UR QL STRIP: NEGATIVE
LDLC SERPL CALC-MCNC: 133 MG/DL (ref 0–99)
LEUKOCYTE ESTERASE UR QL STRIP: NEGATIVE
LYMPHOCYTES # BLD AUTO: 1.7 X10E3/UL (ref 0.7–3.1)
LYMPHOCYTES NFR BLD AUTO: 41 %
MCH RBC QN AUTO: 32.6 PG (ref 26.6–33)
MCHC RBC AUTO-ENTMCNC: 34.2 G/DL (ref 31.5–35.7)
MCV RBC AUTO: 95 FL (ref 79–97)
MICRO URNS: ABNORMAL
MONOCYTES # BLD AUTO: 0.5 X10E3/UL (ref 0.1–0.9)
MONOCYTES NFR BLD AUTO: 12 %
NEUTROPHILS # BLD AUTO: 1.6 X10E3/UL (ref 1.4–7)
NEUTROPHILS NFR BLD AUTO: 39 %
NITRITE UR QL STRIP: NEGATIVE
PH UR STRIP: 8.5 [PH] (ref 5–7.5)
PLATELET # BLD AUTO: 221 X10E3/UL (ref 150–450)
POTASSIUM SERPL-SCNC: 4.8 MMOL/L (ref 3.5–5.2)
PROT SERPL-MCNC: 6.7 G/DL (ref 6–8.5)
PROT UR QL STRIP: NEGATIVE
RBC # BLD AUTO: 3.93 X10E6/UL (ref 3.77–5.28)
SODIUM SERPL-SCNC: 140 MMOL/L (ref 134–144)
SP GR UR STRIP: 1.02 (ref 1–1.03)
TRIGL SERPL-MCNC: 71 MG/DL (ref 0–149)
TSH SERPL DL<=0.005 MIU/L-ACNC: 3.51 UIU/ML (ref 0.45–4.5)
UROBILINOGEN UR STRIP-MCNC: 0.2 MG/DL (ref 0.2–1)
VLDLC SERPL CALC-MCNC: 13 MG/DL (ref 5–40)
WBC # BLD AUTO: 4.1 X10E3/UL (ref 3.4–10.8)

## 2022-06-18 NOTE — PROGRESS NOTES
Please inform patient,All results including blood count, kidney and liver function, thyroid function, urine analysis, screening for diabetes are very normal.Cholesterol profile have improved significantly from previous results since she has been resumed back on simvastatin. To continue taking current medications and keep working on diet and exercise. Thanks

## 2022-06-20 DIAGNOSIS — E78.5 DYSLIPIDEMIA: ICD-10-CM

## 2022-06-20 RX ORDER — SIMVASTATIN 10 MG/1
10 TABLET, FILM COATED ORAL
Qty: 90 TABLET | Refills: 1 | Status: SHIPPED | OUTPATIENT
Start: 2022-06-20 | End: 2022-08-02 | Stop reason: ALTCHOICE

## 2022-06-20 NOTE — PROGRESS NOTES
Called patient. Two patient identifiers verified. Discussed lab results per provider's note. She Verbalized understanding. Pt wants refill for simvastatin    Letter placed in the mail.

## 2022-08-01 ENCOUNTER — NURSE TRIAGE (OUTPATIENT)
Dept: OTHER | Facility: CLINIC | Age: 57
End: 2022-08-01

## 2022-08-01 NOTE — TELEPHONE ENCOUNTER
Received call from Sridhar at [facility] with Red Flag Complaint. Subjective: Caller states \"L shoulder pain\"     Current Symptoms:   + left shoulder pain radiates into neck, limited movement  - chest pain, sob, swelling, numbness     Onset:     1 week     Pain Severity: 8/10;constant    Temperature: none      What has been tried:   None     LMP: NA Pregnant: No    Recommended disposition: See PCP within 3 Days    Care advice provided, patient verbalizes understanding; denies any other questions or concerns; instructed to call back for any new or worsening symptoms. Patient/caller agrees to follow-up with PCP Warm transfer to [facility]. Attention Provider: Thank you for allowing me to participate in the care of your patient. The patient was connected to triage in response to information provided to the E[facility]. Please do not respond through this encounter as the response is not directed to a shared pool.       Reason for Disposition   [1] MODERATE pain (e.g., interferes with normal activities) AND [2] present > 3 days    Protocols used: Shoulder Pain-ADULT-

## 2022-08-02 ENCOUNTER — OFFICE VISIT (OUTPATIENT)
Dept: FAMILY MEDICINE CLINIC | Age: 57
End: 2022-08-02
Payer: COMMERCIAL

## 2022-08-02 VITALS
TEMPERATURE: 97.4 F | HEART RATE: 63 BPM | SYSTOLIC BLOOD PRESSURE: 128 MMHG | DIASTOLIC BLOOD PRESSURE: 75 MMHG | HEIGHT: 72 IN | RESPIRATION RATE: 16 BRPM | OXYGEN SATURATION: 99 % | WEIGHT: 249.2 LBS | BODY MASS INDEX: 33.75 KG/M2

## 2022-08-02 DIAGNOSIS — M75.82 ROTATOR CUFF TENDONITIS, LEFT: ICD-10-CM

## 2022-08-02 DIAGNOSIS — M19.012 PRIMARY OSTEOARTHRITIS OF LEFT SHOULDER: Primary | ICD-10-CM

## 2022-08-02 PROCEDURE — 99213 OFFICE O/P EST LOW 20 MIN: CPT | Performed by: FAMILY MEDICINE

## 2022-08-02 NOTE — PROGRESS NOTES
HISTORY OF PRESENT ILLNESS  Rowan Saldana is a 59 y.o. female. Patient was seen today for left shoulder pain and letter to return to work as well as letter of accommodation for work. HPI  Shoulder Pain  Patient complains of left side shoulder pain. The symptoms began several months ago Course of symptoms since onset has been gradually worsening. Pain is described as overall severity = moderate, location: glenohumeral region, worse with overhead movements and other associated symptoms: pain radiating to arm/hand. Symptoms were incited by no known event. Patient denies fever, previous history of shoulder procedure or conditions. Therapy to date: She was started on meloxicam and home exercises on last visit about 2 months ago  Patient was evaluated by orthopedic specialist about 2 weeks ago and was given work excuse for 2 weeks as well as she was referred for physical therapy and MRI of shoulder. Her insurance did not cover for MRI of shoulder and physical therapy. 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 Review of Systems   Constitutional:  Negative for chills, fever and malaise/fatigue. HENT:  Negative for congestion, ear pain, sore throat and tinnitus. Eyes:  Negative for blurred vision, double vision, pain and discharge. Respiratory:  Negative for cough, shortness of breath and wheezing. Cardiovascular:  Negative for chest pain, palpitations and leg swelling. Gastrointestinal:  Negative for abdominal pain, blood in stool, constipation, diarrhea, nausea and vomiting. Genitourinary:  Negative for dysuria, frequency, hematuria and urgency. Musculoskeletal:  Negative for back pain, joint pain and myalgias. Left shoulder pain   Skin:  Negative for rash. Neurological:  Negative for dizziness, tremors, seizures and headaches.    Endo/Heme/Allergies: Negative for polydipsia. Does not bruise/bleed easily. Psychiatric/Behavioral:  Negative for depression and substance abuse. The patient is not nervous/anxious. Physical Exam  Vitals and nursing note reviewed. Constitutional:       Appearance: She is well-developed. She is not diaphoretic. HENT:      Head: Normocephalic and atraumatic. Right Ear: External ear normal.      Mouth/Throat:      Pharynx: No oropharyngeal exudate. Eyes:      General: No scleral icterus. Conjunctiva/sclera: Conjunctivae normal.      Pupils: Pupils are equal, round, and reactive to light. Neck:      Thyroid: No thyromegaly. Vascular: No JVD. Cardiovascular:      Rate and Rhythm: Normal rate and regular rhythm. Heart sounds: Normal heart sounds. No murmur heard. Pulmonary:      Effort: Pulmonary effort is normal.      Breath sounds: Normal breath sounds. No wheezing. Abdominal:      General: Bowel sounds are normal. There is no distension. Palpations: Abdomen is soft. There is no mass. Musculoskeletal:         General: Normal range of motion. Left shoulder: Tenderness present. No swelling or crepitus. Normal strength. Cervical back: Normal range of motion and neck supple. Comments: Left Shoulder- Empty Can Test: Positive. Drop Arm Test: Negative. Cross-Chest Test: Positive. NEER test Positive  Kennedy' test Positive     Lymphadenopathy:      Cervical: No cervical adenopathy. Skin:     General: Skin is warm and dry. Findings: No rash. Neurological:      Mental Status: She is alert and oriented to person, place, and time. Cranial Nerves: No cranial nerve deficit. Deep Tendon Reflexes: Reflexes are normal and symmetric. Reflexes normal.       ASSESSMENT and PLAN  Diagnoses and all orders for this visit:    1. Primary osteoarthritis of left shoulder    2. Rotator cuff tendonitis, left  Patient was given work excuse with limitation not to lift more than 10 pounds. She was also recommended to continue with home exercises and meloxicam until her orthopedic specialist gets authorization for MRI of shoulder. Discussed lifestyle issues and health guidance given  Patient was given an after visit summary which includes diagnoses, vital signs, current medications, instructions and references & authorized prescriptions . Results of labs will be conveyed to patient, once available. Pt verbalized instructions I provided and expressed understanding of discussion that was held today. Please note that this dictation was completed with [employer], the computer voice recognition software. Quite often unanticipated grammatical, syntax, homophones, and other interpretive errors are inadvertently transcribed by the computer software. Please disregard these errors. Please excuse any errors that have escaped final proofreading. Thank you.

## 2022-08-02 NOTE — LETTER
NOTIFICATION RETURN TO WORK     8/2/2022 3:16 PM    Ms. Lisandra Mott  [address] [place] [address] [address] [phone]      To Whom It May Concern:    Lisandra Mott is currently under the care of IMTIA[facility] Torrez. She will return to work on: 08/08/22 and is recommended not to lift more than 10 lbs for  left upper extremity for 4 more weeks, upto 09/ 02/22    If there are questions or concerns please have the patient contact our office.         Sincerely,      Ana M Pendleton MD

## 2022-08-02 NOTE — PROGRESS NOTES
Chief Complaint   Patient presents with    Shoulder Pain     Pt had a similar complain before was prescribed a medication and finished it. Pain is back. Pt did the recommended exercises         1. \"Have you been to the ER, urgent care clinic since your last visit? Hospitalized since your last visit? \" No    2. \"Have you seen or consulted any other health care providers outside of the [address] [place] [address] since your last visit? \" Yes When: 7/25/22 Where: [facility] Reason for visit: shoulder pain      3. For patients aged 39-70: Has the patient had a colonoscopy / FIT/ Cologuard? No      If the patient is female:    4. For patients aged 41-77: Has the patient had a mammogram within the past 2 years? No      5. For patients aged 21-65: Has the patient had a pap smear?  No         3 most recent PHQ Screens 8/2/2022   Little interest or pleasure in doing things Not at all   Feeling down, depressed, irritable, or hopeless Not at all   Total Score PHQ 2 0       Health Maintenance Due   Topic Date Due    DTaP/Tdap/Td series (1 - Tdap) Never done    Shingrix Vaccine Age 50> (1 of 2) Never done    COVID-19 Vaccine (3 - Booster for Pfizer series) 09/22/2021    Colorectal Cancer Screening Combo  06/22/2022

## 2022-08-25 ENCOUNTER — OFFICE VISIT (OUTPATIENT)
Dept: FAMILY MEDICINE CLINIC | Age: 57
End: 2022-08-25
Payer: COMMERCIAL

## 2022-08-25 VITALS
WEIGHT: 251.4 LBS | RESPIRATION RATE: 16 BRPM | HEIGHT: 72 IN | BODY MASS INDEX: 34.05 KG/M2 | SYSTOLIC BLOOD PRESSURE: 129 MMHG | OXYGEN SATURATION: 99 % | DIASTOLIC BLOOD PRESSURE: 80 MMHG | TEMPERATURE: 97.7 F | HEART RATE: 71 BPM

## 2022-08-25 DIAGNOSIS — M75.82 ROTATOR CUFF TENDONITIS, LEFT: ICD-10-CM

## 2022-08-25 DIAGNOSIS — M19.012 PRIMARY OSTEOARTHRITIS OF LEFT SHOULDER: Primary | ICD-10-CM

## 2022-08-25 PROCEDURE — 99213 OFFICE O/P EST LOW 20 MIN: CPT | Performed by: FAMILY MEDICINE

## 2022-08-25 RX ORDER — MELOXICAM 15 MG/1
15 TABLET ORAL DAILY
Qty: 90 TABLET | Refills: 0 | Status: SHIPPED | OUTPATIENT
Start: 2022-08-25

## 2022-08-25 NOTE — PROGRESS NOTES
Chief Complaint   Patient presents with    Other     Paperwork to return for work         1. \"Have you been to the ER, urgent care clinic since your last visit? Hospitalized since your last visit? \" No    2. \"Have you seen or consulted any other health care providers outside of the [address] [place] [address] since your last visit? \" No     3. For patients aged 39-70: Has the patient had a colonoscopy / FIT/ Cologuard? No      If the patient is female:    4. For patients aged 41-77: Has the patient had a mammogram within the past 2 years? No      5. For patients aged 21-65: Has the patient had a pap smear?  No         3 most recent PHQ Screens 8/25/2022   Little interest or pleasure in doing things Not at all   Feeling down, depressed, irritable, or hopeless Not at all   Total Score PHQ 2 0       Health Maintenance Due   Topic Date Due    DTaP/Tdap/Td series (1 - Tdap) Never done    Shingrix Vaccine Age 50> (1 of 2) Never done    COVID-19 Vaccine (3 - Booster for Pfizer series) 09/22/2021    Colorectal Cancer Screening Combo  06/22/2022

## 2022-08-25 NOTE — LETTER
NOTIFICATION RETURN TO WORK     8/25/2022 8:28 AM    Ms. Princess Akins  [address] [place] [address] [address] [phone]      To Whom It May Concern:    Princess Akins is currently under the care of IMTIA[facility]. She is cleared to return to work without any restrictions  on: 08/26/22    If there are questions or concerns please have the patient contact our office.         Sincerely,      Minnie Samaniego MD

## 2022-08-25 NOTE — PROGRESS NOTES
HISTORY OF PRESENT ILLNESS  Chauncey Vicente is a 59 y.o. female. Patient was seen today to complete FMLA paperwork as well as to clear her to return to work after her shoulder tendinitis. HPI  Patient was seen about 3 weeks ago with severe left-sided shoulder pain. 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 Her symptoms of left shoulder pain and restriction of movement started several weeks before her initial appointment on 08/02/2022. Patient was diagnosed with shoulder tendinitis and was recommended not to lift heavy weight or raise left arm overhead along with home exercises and NSAIDs. She was given work accommodation recommendation. Her employer did not ellis work accommodation and recommended her to stay home until she fully recovers. Patient was seen in office today to get a letter to return to work as well as to complete FMLA paperwork for the days she was absent from work due to her left shoulder pain. According to patient, her pain has improved significantly and she is following home exercises very regularly along with taking medication as needed. Review of Systems   Constitutional:  Negative for chills, fever and malaise/fatigue. HENT:  Negative for congestion, ear pain, sore throat and tinnitus. Eyes:  Negative for blurred vision, double vision, pain and discharge. Respiratory:  Negative for cough, shortness of breath and wheezing. Cardiovascular:  Negative for chest pain, palpitations and leg swelling. Gastrointestinal:  Negative for abdominal pain, blood in stool, constipation, diarrhea, nausea and vomiting. Genitourinary:  Negative for dysuria, frequency, hematuria and urgency. Musculoskeletal:  Negative for back pain, joint pain and myalgias. Left shoulder pain   Skin:  Negative for rash. Neurological:  Negative for dizziness, tremors, seizures and headaches. Endo/Heme/Allergies:  Negative for polydipsia. Does not bruise/bleed easily. Psychiatric/Behavioral:  Negative for depression and substance abuse. The patient is not nervous/anxious. Physical Exam  Vitals and nursing note reviewed. Constitutional:       Appearance: She is well-developed. She is not diaphoretic. HENT:      Head: Normocephalic and atraumatic. Right Ear: External ear normal.      Mouth/Throat:      Mouth: Mucous membranes are moist.      Pharynx: No oropharyngeal exudate. Eyes:      General: No scleral icterus. Conjunctiva/sclera: Conjunctivae normal.      Pupils: Pupils are equal, round, and reactive to light. Neck:      Thyroid: No thyromegaly. Vascular: No JVD. Cardiovascular:      Rate and Rhythm: Normal rate and regular rhythm. Heart sounds: Normal heart sounds. No murmur heard. Pulmonary:      Effort: Pulmonary effort is normal.      Breath sounds: Normal breath sounds. No wheezing. Abdominal:      General: Bowel sounds are normal. There is no distension. Palpations: Abdomen is soft. There is no mass. Musculoskeletal:         General: No tenderness. Normal range of motion. Cervical back: Normal range of motion and neck supple. Comments: Significant improvement in active and passive ROM of left shoulder   Lymphadenopathy:      Cervical: No cervical adenopathy. Skin:     General: Skin is warm and dry. Findings: No rash. Neurological:      Mental Status: She is alert and oriented to person, place, and time. Cranial Nerves: No cranial nerve deficit. Deep Tendon Reflexes: Reflexes are normal and symmetric. Reflexes normal.   Psychiatric:         Mood and Affect: Mood normal.         Behavior: Behavior normal.       ASSESSMENT and PLAN  Diagnoses and all orders for this visit:    1. Primary osteoarthritis of left shoulder  -     meloxicam (MOBIC) 15 mg tablet; Take 1 Tablet by mouth daily.     2. Rotator cuff tendonitis, left  Patient was cleared to return to work from tomorrow. Strongly recommended to continue with home exercises and NSAID. FMLA paperwork will be completed and faxed to human resources. Discussed lifestyle issues and health guidance given  Patient was given an after visit summary which includes diagnoses, vital signs, current medications, instructions and references & authorized prescriptions . Results of labs will be conveyed to patient, once available. Pt verbalized instructions I provided and expressed understanding of discussion that was held today. Please note that this dictation was completed with [employer], the computer voice recognition software. Quite often unanticipated grammatical, syntax, homophones, and other interpretive errors are inadvertently transcribed by the computer software. Please disregard these errors. Please excuse any errors that have escaped final proofreading. Thank you.

## 2022-10-13 ENCOUNTER — TRANSCRIBE ORDER (OUTPATIENT)
Dept: SCHEDULING | Age: 57
End: 2022-10-13

## 2022-10-13 DIAGNOSIS — Z12.31 ENCOUNTER FOR SCREENING MAMMOGRAM FOR MALIGNANT NEOPLASM OF BREAST: Primary | ICD-10-CM

## 2022-10-14 ENCOUNTER — TELEPHONE (OUTPATIENT)
Dept: FAMILY MEDICINE CLINIC | Age: 57
End: 2022-10-14

## 2022-10-14 DIAGNOSIS — Z86.000 HISTORY OF DUCTAL CARCINOMA IN SITU (DCIS) OF BREAST: ICD-10-CM

## 2022-10-14 DIAGNOSIS — Z12.31 SCREENING MAMMOGRAM FOR HIGH-RISK PATIENT: Primary | ICD-10-CM

## 2022-10-14 NOTE — TELEPHONE ENCOUNTER
Chief Complaint   Patient presents with    New Order     Mammogram 3 D. Patient next scheduled office visit is 12/05/2022.   Enmanuel Narvaez LPN

## 2022-10-14 NOTE — TELEPHONE ENCOUNTER
----- Message from [employer] sent at 10/14/2022  9:17 AM EDT -----  Subject: Referral Request    Reason for referral request? 3 D Diagnostic Mammogram order needed   (bilateral)  Provider patient wants to be referred to(if known):     Provider Phone Number(if known): Additional Information for Provider?   ---------------------------------------------------------------------------  --------------  [id number] [employer]    [phone];  Do not leave any message, patient will call back for answer  ---------------------------------------------------------------------------  --------------

## 2022-12-05 ENCOUNTER — OFFICE VISIT (OUTPATIENT)
Dept: FAMILY MEDICINE CLINIC | Age: 57
End: 2022-12-05
Payer: COMMERCIAL

## 2022-12-05 VITALS
DIASTOLIC BLOOD PRESSURE: 81 MMHG | RESPIRATION RATE: 16 BRPM | WEIGHT: 251.2 LBS | OXYGEN SATURATION: 99 % | HEIGHT: 72 IN | BODY MASS INDEX: 34.02 KG/M2 | SYSTOLIC BLOOD PRESSURE: 130 MMHG | HEART RATE: 77 BPM | TEMPERATURE: 97.3 F

## 2022-12-05 DIAGNOSIS — E03.9 HYPOTHYROIDISM, ADULT: ICD-10-CM

## 2022-12-05 DIAGNOSIS — J00 COMMON COLD: ICD-10-CM

## 2022-12-05 DIAGNOSIS — Z12.11 COLON CANCER SCREENING: ICD-10-CM

## 2022-12-05 DIAGNOSIS — M16.11 PRIMARY OSTEOARTHRITIS OF RIGHT HIP: ICD-10-CM

## 2022-12-05 DIAGNOSIS — E78.5 DYSLIPIDEMIA: Primary | ICD-10-CM

## 2022-12-05 DIAGNOSIS — B30.9 VIRAL CONJUNCTIVITIS OF BOTH EYES: ICD-10-CM

## 2022-12-05 DIAGNOSIS — Z23 ENCOUNTER FOR IMMUNIZATION: ICD-10-CM

## 2022-12-05 PROCEDURE — 99214 OFFICE O/P EST MOD 30 MIN: CPT | Performed by: FAMILY MEDICINE

## 2022-12-05 PROCEDURE — 90471 IMMUNIZATION ADMIN: CPT | Performed by: FAMILY MEDICINE

## 2022-12-05 PROCEDURE — 90686 IIV4 VACC NO PRSV 0.5 ML IM: CPT | Performed by: FAMILY MEDICINE

## 2022-12-05 RX ORDER — KETOTIFEN FUMARATE 0.35 MG/ML
1 SOLUTION/ DROPS OPHTHALMIC 2 TIMES DAILY
Qty: 5 ML | Refills: 0 | Status: SHIPPED | OUTPATIENT
Start: 2022-12-05 | End: 2022-12-15

## 2022-12-05 RX ORDER — BENZONATATE 200 MG/1
200 CAPSULE ORAL
Qty: 20 CAPSULE | Refills: 0 | Status: SHIPPED | OUTPATIENT
Start: 2022-12-05 | End: 2022-12-12

## 2022-12-05 RX ORDER — MELOXICAM 15 MG/1
15 TABLET ORAL
Qty: 30 TABLET | Refills: 0 | Status: SHIPPED | OUTPATIENT
Start: 2022-12-05

## 2022-12-05 NOTE — LETTER
NOTIFICATION RETURN TO WORK     12/5/2022 1:53 PM    Ms. Aniya Powers  [address] [place] [address] [address] [phone]      To Whom It May Concern:    Aniya Powers is currently under the care of IMTIA[facility] Torrez. She will return to work/school on: 12/06/22    If there are questions or concerns please have the patient contact our office.         Sincerely,      Agnieszka Rothman MD

## 2022-12-05 NOTE — PROGRESS NOTES
Chief Complaint   Patient presents with    Follow-up         1. \"Have you been to the ER, urgent care clinic since your last visit? Hospitalized since your last visit? \" No    2. \"Have you seen or consulted any other health care providers outside of the [address] [place] [address] since your last visit? \" No     3. For patients aged 39-70: Has the patient had a colonoscopy / FIT/ Cologuard? No      If the patient is female:    4. For patients aged 41-77: Has the patient had a mammogram within the past 2 years? No      5. For patients aged 21-65: Has the patient had a pap smear?  No         3 most recent PHQ Screens 12/5/2022   Little interest or pleasure in doing things Not at all   Feeling down, depressed, irritable, or hopeless Not at all   Total Score PHQ 2 0       Health Maintenance Due   Topic Date Due    DTaP/Tdap/Td series (1 - Tdap) Never done    Shingrix Vaccine Age 50> (1 of 2) Never done    COVID-19 Vaccine (3 - Booster for Pfizer series) 06/17/2021    Colorectal Cancer Screening Combo  06/22/2022    Flu Vaccine (1) 08/01/2022    Breast Cancer Screen Mammogram  11/02/2022

## 2022-12-05 NOTE — PROGRESS NOTES
HISTORY OF PRESENT ILLNESS  Velvet Antonio is a 59 y.o. female. Patient was seen today for 6 months follow-up appointment on dyslipidemia, hypothyroidism. She is concerned about right side hip pain as well as redness and watering of both eyes and persistent cough and congestion for last 1 month. HPI   Cardiovascular Review  The patient has hyperlipidemia. She reports taking medications as instructed, no medication side effects noted, no TIA's, no chest pain on exertion, no dyspnea on exertion, noting swelling of ankles, no orthostatic dizziness or lightheadedness, no orthopnea or paroxysmal nocturnal dyspnea, no palpitations, no intermittent claudication symptoms. Diet and Lifestyle: generally follows a low fat low cholesterol diet, generally follows a low sodium diet, exercises sporadically, nonsmoker. Lab review: labs reviewed and discussed with patient. Medicines: Not on any medicine anymore        Endocrine Review  Patient is seen for followup of hypothyroidism. Since last visit: She is persistently off from her levothyroxine. She reports the following concerns/problems/med side effects: none. Lab review: labs reviewed and discussed with patient. No more on medications  Hip Pain  Patient complains of right hip pain. Onset of the symptoms was several days ago. Inciting event: twisted while doing yoga's and stretches. Current symptoms include right sided hip pain. Severity = moderate  location: anterior  radiates to: Right groin and right upper thigh  worse with weight bearing  aggravated by walking  worse after period of inactivity. Associated symptoms: none. Aggravating symptoms: going up and down stairs, standing, walking, running, squatting, kneeling, pivoting, rising after sitting. Patient's overall course: symptoms have progressed to a point and plateaued. . Patient has had no prior hip problems. Previous visits for this problem: none. Evaluation to date: none. Treatment to date: none.   URI Review  Darron Holland returns to clinic today to talk about: URI symptoms, sinus congestion, post nasal drip, rhinorrhea, productive cough, and tearing and redness of both eyes that started gradual and a few days ago, and is unchanged since that time. She reports cough described as productive of clear sputum, nocturnal, hoarse. She denies a history of: fever, chest congestion, wheezing, SOB, PINTO, fatigue, and myalgias. Treatments have included: decongestants, antihistamines, cough suppressants, which have been not very effective. Relevant PMH: allergic rhinitis. Patient reports sick contacts: no.      Review of Systems   Constitutional:  Negative for chills, fever and malaise/fatigue. HENT:  Positive for congestion. Negative for ear pain, sore throat and tinnitus. Eyes:  Positive for redness. Negative for blurred vision, double vision, pain and discharge. Watering of eyes   Respiratory:  Positive for cough. Negative for shortness of breath and wheezing. Cardiovascular:  Negative for chest pain, palpitations and leg swelling. Gastrointestinal:  Negative for abdominal pain, blood in stool, constipation, diarrhea, nausea and vomiting. Genitourinary:  Negative for dysuria, frequency, hematuria and urgency. Musculoskeletal:  Negative for back pain, joint pain and myalgias. Right hip pain   Skin:  Negative for rash. Neurological:  Negative for dizziness, tremors, seizures and headaches. Endo/Heme/Allergies:  Negative for polydipsia. Does not bruise/bleed easily. Psychiatric/Behavioral:  Negative for depression and substance abuse. The patient is not nervous/anxious. Physical Exam  Vitals and nursing note reviewed. Constitutional:       General: She is not in acute distress. Appearance: She is well-developed. She is not diaphoretic. HENT:      Head: Normocephalic and atraumatic. Right Ear: Tympanic membrane and external ear normal. No decreased hearing noted. No drainage. No middle ear effusion. Tympanic membrane is not injected. Left Ear: Tympanic membrane normal. No decreased hearing noted. No drainage. No middle ear effusion. Tympanic membrane is not injected. Nose: Mucosal edema and congestion present. No rhinorrhea. Right Sinus: Maxillary sinus tenderness and frontal sinus tenderness present. Left Sinus: Maxillary sinus tenderness and frontal sinus tenderness present. Mouth/Throat:      Mouth: Mucous membranes are moist.      Pharynx: Uvula midline. Posterior oropharyngeal erythema present. No oropharyngeal exudate. Eyes:      General: No scleral icterus. Conjunctiva/sclera: Conjunctivae normal.      Pupils: Pupils are equal, round, and reactive to light. Neck:      Thyroid: No thyromegaly. Vascular: No JVD. Cardiovascular:      Rate and Rhythm: Normal rate and regular rhythm. Heart sounds: Normal heart sounds. No murmur heard. Pulmonary:      Effort: Pulmonary effort is normal.      Breath sounds: Normal breath sounds. No wheezing or rales. Abdominal:      General: Bowel sounds are normal. There is no distension. Palpations: Abdomen is soft. There is no mass. Musculoskeletal:         General: No tenderness. Normal range of motion. Cervical back: Normal range of motion and neck supple. Comments: Right side hip joint examination showed positive SLR, positive JR with pain referred to anterior groin as well as positive FADIR with pain referred to anterior groin   Lymphadenopathy:      Head:      Right side of head: No tonsillar adenopathy. Left side of head: No tonsillar adenopathy. Cervical: No cervical adenopathy. Skin:     General: Skin is warm and dry. Findings: No rash. Neurological:      Mental Status: She is alert and oriented to person, place, and time. Cranial Nerves: No cranial nerve deficit. Deep Tendon Reflexes: Reflexes are normal and symmetric.  Reflexes normal. Psychiatric:         Mood and Affect: Mood normal.         Behavior: Behavior normal.       ASSESSMENT and PLAN  Diagnoses and all orders for this visit:    1. Dyslipidemia  -     LIPID PANEL; Future  -     METABOLIC PANEL, COMPREHENSIVE; Future    2. Hypothyroidism, adult  -     TSH 3RD GENERATION; Future  -     T4, FREE; Future    3. Encounter for immunization  -     INFLUENZA, FLUARIX, FLULAVAL, FLUZONE (AGE 6 MO+), AFLURIA(AGE 3Y+) IM, PF, 0.5 ML    4. Colon cancer screening  -     POCT FECAL IMMUNOCHEMICAL TEST (FIT)    5. Primary osteoarthritis of right hip  -     meloxicam (MOBIC) 15 mg tablet; Take 1 Tablet by mouth nightly. 6. Common cold  -     benzonatate (TESSALON) 200 mg capsule; Take 1 Capsule by mouth three (3) times daily as needed for Cough for up to 7 days. 7. Viral conjunctivitis of both eyes  -     ketotifen (ZADITOR) 0.025 % (0.035 %) ophthalmic solution; Administer 1 Drop to both eyes two (2) times a day for 10 days. Discussed with patient that most likely she has flulike sickness. Recommended to stay hydrated and to take over-the-counter Tylenol Sinus. Discussed her likely diagnosis of arthritis in right hip. Exercises were demonstrated and printout was given. Started on NSAID. If no improvement will do further work-up. Discussed lifestyle issues and health guidance given  Patient was given an after visit summary which includes diagnoses, vital signs, current medications, instructions and references & authorized prescriptions . Results of labs will be conveyed to patient, once available. Pt verbalized instructions I provided and expressed understanding of discussion that was held today. Follow-up and Dispositions    Return in about 6 months (around 6/5/2023) for fasting, physical.         Please note that this dictation was completed with [employer], the [employer] voice recognition software.   Quite often unanticipated grammatical, syntax, homophones, and other interpretive errors are inadvertently transcribed by the computer software. Please disregard these errors. Please excuse any errors that have escaped final proofreading. Thank you.

## 2022-12-06 LAB
ALBUMIN SERPL-MCNC: 4.1 G/DL (ref 3.8–4.9)
ALBUMIN/GLOB SERPL: 1.3 {RATIO} (ref 1.2–2.2)
ALP SERPL-CCNC: 84 IU/L (ref 44–121)
ALT SERPL-CCNC: 8 IU/L (ref 0–32)
AST SERPL-CCNC: 15 IU/L (ref 0–40)
BILIRUB SERPL-MCNC: 0.4 MG/DL (ref 0–1.2)
BUN SERPL-MCNC: 13 MG/DL (ref 6–24)
BUN/CREAT SERPL: 19 (ref 9–23)
CALCIUM SERPL-MCNC: 9.3 MG/DL (ref 8.7–10.2)
CHLORIDE SERPL-SCNC: 102 MMOL/L (ref 96–106)
CHOLEST SERPL-MCNC: 203 MG/DL (ref 100–199)
CO2 SERPL-SCNC: 26 MMOL/L (ref 20–29)
CREAT SERPL-MCNC: 0.7 MG/DL (ref 0.57–1)
EGFR: 100 ML/MIN/1.73
GLOBULIN SER CALC-MCNC: 3.1 G/DL (ref 1.5–4.5)
GLUCOSE SERPL-MCNC: 82 MG/DL (ref 70–99)
HDLC SERPL-MCNC: 44 MG/DL
IMP & REVIEW OF LAB RESULTS: NORMAL
LDLC SERPL CALC-MCNC: 141 MG/DL (ref 0–99)
POTASSIUM SERPL-SCNC: 4.8 MMOL/L (ref 3.5–5.2)
PROT SERPL-MCNC: 7.2 G/DL (ref 6–8.5)
SODIUM SERPL-SCNC: 140 MMOL/L (ref 134–144)
T4 FREE SERPL-MCNC: 1.12 NG/DL (ref 0.82–1.77)
TRIGL SERPL-MCNC: 102 MG/DL (ref 0–149)
TSH SERPL DL<=0.005 MIU/L-ACNC: 4.22 UIU/ML (ref 0.45–4.5)
VLDLC SERPL CALC-MCNC: 18 MG/DL (ref 5–40)

## 2022-12-07 NOTE — PROGRESS NOTES
Please inform patient,  All results including kidney and liver function, thyroid function, cholesterol profile are very normal.  To continue working on diet and exercise and just making sure she brings her stool sample back to office.   Thanks

## 2022-12-07 NOTE — PROGRESS NOTES
Called patient. Two patient identifiers verified. Discussed lab results per provider's note. She Verbalized understanding.  Reminded pt return stool sample back to office

## 2022-12-19 NOTE — PROGRESS NOTES
Called patient. Two patient identifiers verified. Discussed stool occult results per provider's note. She Verbalized understanding.

## 2022-12-27 ENCOUNTER — HOSPITAL ENCOUNTER (OUTPATIENT)
Dept: MAMMOGRAPHY | Age: 57
Discharge: HOME | End: 2022-12-27
Attending: FAMILY MEDICINE
Payer: COMMERCIAL

## 2022-12-27 DIAGNOSIS — Z12.31 SCREENING MAMMOGRAM FOR HIGH-RISK PATIENT: ICD-10-CM

## 2022-12-27 DIAGNOSIS — Z86.000 HISTORY OF DUCTAL CARCINOMA IN SITU (DCIS) OF BREAST: ICD-10-CM

## 2022-12-27 PROCEDURE — 77062 BREAST TOMOSYNTHESIS BI: CPT

## 2023-01-17 ENCOUNTER — OFFICE VISIT (OUTPATIENT)
Dept: FAMILY MEDICINE CLINIC | Age: 58
End: 2023-01-17
Payer: COMMERCIAL

## 2023-01-17 VITALS
SYSTOLIC BLOOD PRESSURE: 134 MMHG | DIASTOLIC BLOOD PRESSURE: 77 MMHG | HEART RATE: 67 BPM | HEIGHT: 72 IN | WEIGHT: 255 LBS | BODY MASS INDEX: 34.54 KG/M2 | TEMPERATURE: 97.2 F | RESPIRATION RATE: 17 BRPM | OXYGEN SATURATION: 100 %

## 2023-01-17 DIAGNOSIS — R10.31 RLQ ABDOMINAL PAIN: Primary | ICD-10-CM

## 2023-01-17 LAB
BILIRUB UR QL STRIP: NEGATIVE
GLUCOSE UR-MCNC: NEGATIVE MG/DL
KETONES P FAST UR STRIP-MCNC: NEGATIVE MG/DL
PH UR STRIP: 6.5 [PH] (ref 4.6–8)
PROT UR QL STRIP: NEGATIVE
SP GR UR STRIP: 1 (ref 1–1.03)
UA UROBILINOGEN AMB POC: NORMAL (ref 0.2–1)
URINALYSIS CLARITY POC: CLEAR
URINALYSIS COLOR POC: COLORLESS
URINE BLOOD POC: NEGATIVE
URINE LEUKOCYTES POC: NORMAL
URINE NITRITES POC: NEGATIVE

## 2023-01-17 PROCEDURE — 81001 URINALYSIS AUTO W/SCOPE: CPT | Performed by: STUDENT IN AN ORGANIZED HEALTH CARE EDUCATION/TRAINING PROGRAM

## 2023-01-17 PROCEDURE — 99213 OFFICE O/P EST LOW 20 MIN: CPT | Performed by: STUDENT IN AN ORGANIZED HEALTH CARE EDUCATION/TRAINING PROGRAM

## 2023-01-17 NOTE — PROGRESS NOTES
[facility]      Chief Complaint:     Chief Complaint   Patient presents with    Abdominal Pain     Patient states right abdominal pain been going on since a week ago. Case Perez is a 59 y.o. female that presents for: abdominal pain      Assessment/Plan:     Diagnoses and all orders for this visit:    1. RLQ abdominal pain: unclear etiology, urine with LE only, will send to culture and get US  -     AMB POC URINALYSIS DIP STICK AUTO [address]; Future  -     CULTURE, URINE; Future       Follow up:      after workup    Subjective:   HPI:  Case Perez is a 59 y.o. female that presents for:    4 days of RLQ pain- comes and goes . Radiates to center of abdomen. Taking Tylenol which helps some   No fevers or chills  Has had appendectomy    No nausea, vomiting, diarrhea, no urinary symptoms  Looser stool     Health Maintenance:  Health Maintenance Due   Topic Date Due    DTaP/Tdap/Td series (1 - Tdap) Never done    Shingles Vaccine (1 of 2) Never done    COVID-19 Vaccine (3 - Booster for Pfizer series) 06/17/2021        ROS:   See HPI      Past medical history, social history, and medications personally reviewed. Past Medical History:   Diagnosis Date    Anemia     resolved after tx     Cancer ([facility] [address] 2019    RIGHT     H. PYLORI INFECTION     tx in  2008 no stool Ag to confirm tx    Hypercholesterolemia     Ill-defined condition     HIGH CHOLESTEROL - NO LONGER        Allergies personally reviewed. No Known Allergies       Objective:   Vitals reviewed.   Visit Vitals  /77 (BP 1 Location: Left upper arm, BP Patient Position: Sitting, BP Cuff Size: Adult long)   Pulse 67   Temp 97.2 °F (36.2 °C) (Temporal)   Resp 17   Ht 6' 4\" (1.93 m)   Wt 255 lb (115.7 kg)   SpO2 100%   BMI 31.04 kg/m²        Wt Readings from Last 3 Encounters:   01/17/23 255 lb (115.7 kg)   12/05/22 251 lb 3.2 oz (113.9 kg)   08/25/22 251 lb 6.4 oz (114 kg)        Physical Exam  Physical Exam Vitals: Reviewed. General: AO x 3. No distress. Not diaphoretic. No jaundice. No cyanosis. No pallor. HEENT: No thyromegaly or JVD  Cardio: Normal rate, regular rhythm. No murmur, rubs, or gallop. Pulmonary: Effort normal. No accessory muscle use. No wheezes, rales, or rhonchi. Abdominal: Soft. No rebound or guarding. No tenderness. No distension. Skin: No rash. I have reviewed pertinent labs results and other data. I have discussed the diagnosis with the patient and the intended plan as seen in the above orders. The patient has received an after-visit summary and questions were answered concerning future plans. I have discussed medication side effects and warnings with the patient as well.     Shalini Martinez,   01/17/23

## 2023-01-17 NOTE — PROGRESS NOTES
Chief Complaint   Patient presents with    Abdominal Pain     Patient states abdominal pain been going on since a week ago. 1. \"Have you been to the ER, urgent care clinic since your last visit? Hospitalized since your last visit? \" No    2. \"Have you seen or consulted any other health care providers outside of the [address] [place] [address] since your last visit? \" No     3. For patients aged 39-70: Has the patient had a colonoscopy / FIT/ Cologuard? No      If the patient is female:    4. For patients aged 41-77: Has the patient had a mammogram within the past 2 years? Yes - no Care Gap present      5. For patients aged 21-65: Has the patient had a pap smear?  Yes - no Care Gap present         3 most recent PHQ Screens 12/5/2022   Little interest or pleasure in doing things Not at all   Feeling down, depressed, irritable, or hopeless Not at all   Total Score PHQ 2 0       Health Maintenance Due   Topic Date Due    DTaP/Tdap/Td series (1 - Tdap) Never done    Shingles Vaccine (1 of 2) Never done    COVID-19 Vaccine (3 - Booster for Pfizer series) 06/17/2021

## 2023-01-19 LAB — BACTERIA UR CULT: NORMAL

## 2023-01-25 ENCOUNTER — TELEPHONE (OUTPATIENT)
Dept: FAMILY MEDICINE CLINIC | Age: 58
End: 2023-01-25

## 2023-01-25 NOTE — TELEPHONE ENCOUNTER
[facility], w/ [employer] called and stated \"We are needing a dx reason for the ultrasound. We see that she had an appendectomy so we are needing clarification on what to look for.  The pt has an appt with us tomorrow (01/26/23) @ 8 am.\"    Callback Phone Number: [phone]    -AH PSR NINI

## 2023-01-25 NOTE — TELEPHONE ENCOUNTER
Outbound call to imaging and spoke to Rema. Pt's Name and  verified.  Informed that as per provider's note dx is for RUQ pain/ gallbladder and right colon

## 2023-01-25 NOTE — TELEPHONE ENCOUNTER
This is not my order. Dr. Mona Velasquez ordered ultrasound. Please route to appropriate provider.   Thanks

## 2023-01-26 ENCOUNTER — HOSPITAL ENCOUNTER (OUTPATIENT)
Dept: ULTRASOUND IMAGING | Age: 58
Discharge: HOME | End: 2023-01-26
Attending: STUDENT IN AN ORGANIZED HEALTH CARE EDUCATION/TRAINING PROGRAM
Payer: COMMERCIAL

## 2023-01-26 DIAGNOSIS — R10.31 RLQ ABDOMINAL PAIN: ICD-10-CM

## 2023-01-26 PROCEDURE — 76705 ECHO EXAM OF ABDOMEN: CPT

## 2023-06-08 ENCOUNTER — OFFICE VISIT (OUTPATIENT)
Age: 58
End: 2023-06-08
Payer: COMMERCIAL

## 2023-06-08 VITALS
DIASTOLIC BLOOD PRESSURE: 80 MMHG | HEIGHT: 72 IN | BODY MASS INDEX: 34.43 KG/M2 | SYSTOLIC BLOOD PRESSURE: 133 MMHG | HEART RATE: 62 BPM | OXYGEN SATURATION: 100 % | RESPIRATION RATE: 16 BRPM | TEMPERATURE: 97.4 F | WEIGHT: 254.2 LBS

## 2023-06-08 DIAGNOSIS — Z00.00 ENCOUNTER FOR GENERAL ADULT MEDICAL EXAMINATION WITHOUT ABNORMAL FINDINGS: Primary | ICD-10-CM

## 2023-06-08 DIAGNOSIS — Z12.11 ENCOUNTER FOR SCREENING FOR MALIGNANT NEOPLASM OF COLON: ICD-10-CM

## 2023-06-08 DIAGNOSIS — Z11.4 SCREENING FOR HIV WITHOUT PRESENCE OF RISK FACTORS: ICD-10-CM

## 2023-06-08 PROCEDURE — 99396 PREV VISIT EST AGE 40-64: CPT | Performed by: FAMILY MEDICINE

## 2023-06-08 SDOH — ECONOMIC STABILITY: INCOME INSECURITY: HOW HARD IS IT FOR YOU TO PAY FOR THE VERY BASICS LIKE FOOD, HOUSING, MEDICAL CARE, AND HEATING?: NOT HARD AT ALL

## 2023-06-08 SDOH — ECONOMIC STABILITY: FOOD INSECURITY: WITHIN THE PAST 12 MONTHS, YOU WORRIED THAT YOUR FOOD WOULD RUN OUT BEFORE YOU GOT MONEY TO BUY MORE.: NEVER TRUE

## 2023-06-08 SDOH — ECONOMIC STABILITY: FOOD INSECURITY: WITHIN THE PAST 12 MONTHS, THE FOOD YOU BOUGHT JUST DIDN'T LAST AND YOU DIDN'T HAVE MONEY TO GET MORE.: NEVER TRUE

## 2023-06-08 SDOH — ECONOMIC STABILITY: HOUSING INSECURITY
IN THE LAST 12 MONTHS, WAS THERE A TIME WHEN YOU DID NOT HAVE A STEADY PLACE TO SLEEP OR SLEPT IN A SHELTER (INCLUDING NOW)?: NO

## 2023-06-08 ASSESSMENT — ENCOUNTER SYMPTOMS
SORE THROAT: 0
CONSTIPATION: 0
COUGH: 0
ABDOMINAL PAIN: 0
BACK PAIN: 0
CHEST TIGHTNESS: 0
DIARRHEA: 0
SHORTNESS OF BREATH: 0

## 2023-06-08 ASSESSMENT — PATIENT HEALTH QUESTIONNAIRE - PHQ9
SUM OF ALL RESPONSES TO PHQ QUESTIONS 1-9: 0
SUM OF ALL RESPONSES TO PHQ9 QUESTIONS 1 & 2: 0
SUM OF ALL RESPONSES TO PHQ QUESTIONS 1-9: 0
SUM OF ALL RESPONSES TO PHQ QUESTIONS 1-9: 0
2. FEELING DOWN, DEPRESSED OR HOPELESS: 0
SUM OF ALL RESPONSES TO PHQ QUESTIONS 1-9: 0
1. LITTLE INTEREST OR PLEASURE IN DOING THINGS: 0

## 2023-06-08 NOTE — PROGRESS NOTES
Chief Complaint   Patient presents with    Annual Exam     Follow up    Headache     On/off when it becomes bad pt takes tylenol, pt is not sure if it is because of fatigue         1. \"Have you been to the ER, urgent care clinic since your last visit? Hospitalized since your last visit? \"     no       2. \"Have you seen or consulted any other health care providers outside of the [address] [place] [address] since your last visit? \"       no     3. For patients aged 39-70: Has the patient had a colonoscopy / FIT/ Cologuard? No      If the patient is female: 4. For patients aged 41-77: Has the patient had a mammogram within the past 2 years? No      5. For patients aged 21-65: Has the patient had a pap smear?  No      PHQ-9  6/8/2023   Little interest or pleasure in doing things 0   Little interest or pleasure in doing things -   Feeling down, depressed, or hopeless 0   PHQ-2 Score 0   Total Score PHQ 2 -   PHQ-9 Total Score 0           Financial Resource Strain: Low Risk     Difficulty of Paying Living Expenses: Not hard at all      Food Insecurity: No Food Insecurity    Worried About Running Out of Food in the Last Year: Never true    Ran Out of Food in the Last Year: Never true          Health Maintenance Due   Topic Date Due    HIV screen  Never done    DTaP/Tdap/Td vaccine (1 - Tdap) Never done    Shingles vaccine (1 of 2) Never done    COVID-19 Vaccine (3 - Booster for Pfizer series) 06/17/2021    Colorectal Cancer Screen  06/22/2022
SUSANATIO 1.3 12/05/2022     Hemoglobin A1C   Date Value Ref Range Status   06/17/2022 5.4 4.8 - 5.6 % Final     Comment:              Prediabetes: 5.7 - 6.4           Diabetes: >6.4           Glycemic control for adults with diabetes: <7.0            Assessment & Plan      1. Encounter for general adult medical examination without abnormal findings  -     CBC with Auto Differential; Future  -     Comprehensive Metabolic Panel; Future  -     Hemoglobin A1C; Future  -     Lipid Panel; Future  -     TSH; Future  -     Urinalysis with Microscopic; Future  2. Screening for HIV without presence of risk factors  -     HIV 1/2 Ag/Ab, 4TH Generation,W Rflx Confirm; Future  3. Encounter for screening for malignant neoplasm of colon  -     AFL - Juancarlos Osorio MD, GastroenterologyJhoan ([facility])       Discussed lifestyle issues and health guidance given  Patient was given an after visit summary which includes diagnoses, vital signs, current medications, instructions and references & authorized prescriptions . Results of labs will be conveyed to patient, once available. Pt verbalized instructions I provided and expressed understanding of discussion that was held today. Please note that this dictation was completed with [facility], the [employer] voice recognition software. Quite often unanticipated grammatical, syntax, homophones, and other interpretive errors are inadvertently transcribed by the computer software. Please disregard these errors. Please excuse any errors that have escaped final proofreading. Thank you.      Return in about 1 year (around 6/8/2024) for fasting, physical.     Electronically signed by Jessica Silva MD on 6/8/23 at 12:41 PM EDT

## 2023-06-09 LAB
ALBUMIN SERPL-MCNC: 4.2 G/DL (ref 3.8–4.9)
ALBUMIN/GLOB SERPL: 1.4 {RATIO} (ref 1.2–2.2)
ALP SERPL-CCNC: 82 IU/L (ref 44–121)
ALT SERPL-CCNC: 10 IU/L (ref 0–32)
APPEARANCE UR: CLEAR
AST SERPL-CCNC: 11 IU/L (ref 0–40)
BACTERIA #/AREA URNS HPF: ABNORMAL /[HPF]
BASOPHILS # BLD AUTO: 0 X10E3/UL (ref 0–0.2)
BASOPHILS NFR BLD AUTO: 0 %
BILIRUB SERPL-MCNC: 0.8 MG/DL (ref 0–1.2)
BILIRUB UR QL STRIP: NEGATIVE
BUN SERPL-MCNC: 7 MG/DL (ref 8–27)
BUN/CREAT SERPL: 9 (ref 12–28)
CALCIUM SERPL-MCNC: 9.2 MG/DL (ref 8.7–10.3)
CASTS URNS QL MICRO: ABNORMAL /LPF
CHLORIDE SERPL-SCNC: 108 MMOL/L (ref 96–106)
CHOLEST SERPL-MCNC: 219 MG/DL (ref 100–199)
CO2 SERPL-SCNC: 25 MMOL/L (ref 20–29)
COLOR UR: YELLOW
CREAT SERPL-MCNC: 0.77 MG/DL (ref 0.57–1)
EGFRCR SERPLBLD CKD-EPI 2021: 88 ML/MIN/1.73
EOSINOPHIL # BLD AUTO: 0.3 X10E3/UL (ref 0–0.4)
EOSINOPHIL NFR BLD AUTO: 8 %
EPI CELLS #/AREA URNS HPF: ABNORMAL /HPF (ref 0–10)
ERYTHROCYTE [DISTWIDTH] IN BLOOD BY AUTOMATED COUNT: 12.8 % (ref 11.7–15.4)
GLOBULIN SER CALC-MCNC: 2.9 G/DL (ref 1.5–4.5)
GLUCOSE SERPL-MCNC: 95 MG/DL (ref 70–99)
GLUCOSE UR QL STRIP: NEGATIVE
HBA1C MFR BLD: 5.4 % (ref 4.8–5.6)
HCT VFR BLD AUTO: 41.1 % (ref 34–46.6)
HDLC SERPL-MCNC: 50 MG/DL
HGB BLD-MCNC: 13.6 G/DL (ref 11.1–15.9)
HGB UR QL STRIP: NEGATIVE
HIV 1+2 AB+HIV1 P24 AG SERPL QL IA: NON REACTIVE
IMM GRANULOCYTES # BLD AUTO: 0 X10E3/UL (ref 0–0.1)
IMM GRANULOCYTES NFR BLD AUTO: 0 %
IMP & REVIEW OF LAB RESULTS: NORMAL
KETONES UR QL STRIP: NEGATIVE
LDLC SERPL CALC-MCNC: 151 MG/DL (ref 0–99)
LEUKOCYTE ESTERASE UR QL STRIP: NEGATIVE
LYMPHOCYTES # BLD AUTO: 1.6 X10E3/UL (ref 0.7–3.1)
LYMPHOCYTES NFR BLD AUTO: 39 %
MCH RBC QN AUTO: 31.6 PG (ref 26.6–33)
MCHC RBC AUTO-ENTMCNC: 33.1 G/DL (ref 31.5–35.7)
MCV RBC AUTO: 96 FL (ref 79–97)
MICRO URNS: ABNORMAL
MICRO URNS: ABNORMAL
MONOCYTES # BLD AUTO: 0.4 X10E3/UL (ref 0.1–0.9)
MONOCYTES NFR BLD AUTO: 9 %
NEUTROPHILS # BLD AUTO: 1.8 X10E3/UL (ref 1.4–7)
NEUTROPHILS NFR BLD AUTO: 44 %
NITRITE UR QL STRIP: NEGATIVE
PH UR STRIP: 8.5 [PH] (ref 5–7.5)
PLATELET # BLD AUTO: 245 X10E3/UL (ref 150–450)
POTASSIUM SERPL-SCNC: 4.6 MMOL/L (ref 3.5–5.2)
PROT SERPL-MCNC: 7.1 G/DL (ref 6–8.5)
PROT UR QL STRIP: NEGATIVE
RBC # BLD AUTO: 4.3 X10E6/UL (ref 3.77–5.28)
RBC #/AREA URNS HPF: ABNORMAL /HPF (ref 0–2)
SODIUM SERPL-SCNC: 146 MMOL/L (ref 134–144)
SP GR UR STRIP: 1.01 (ref 1–1.03)
TRIGL SERPL-MCNC: 98 MG/DL (ref 0–149)
TSH SERPL DL<=0.005 MIU/L-ACNC: 3.02 UIU/ML (ref 0.45–4.5)
UROBILINOGEN UR STRIP-MCNC: 0.2 MG/DL (ref 0.2–1)
VLDLC SERPL CALC-MCNC: 18 MG/DL (ref 5–40)
WBC # BLD AUTO: 4.1 X10E3/UL (ref 3.4–10.8)
WBC #/AREA URNS HPF: ABNORMAL /HPF (ref 0–5)

## 2023-11-27 ENCOUNTER — TRANSCRIBE ORDERS (OUTPATIENT)
Facility: HOSPITAL | Age: 58
End: 2023-11-27

## 2023-11-27 DIAGNOSIS — Z12.31 SCREENING MAMMOGRAM FOR HIGH-RISK PATIENT: Primary | ICD-10-CM

## 2023-12-28 ENCOUNTER — TELEPHONE (OUTPATIENT)
Age: 58
End: 2023-12-28

## 2023-12-28 ENCOUNTER — HOSPITAL ENCOUNTER (OUTPATIENT)
Facility: HOSPITAL | Age: 58
Discharge: HOME | End: 2023-12-28
Attending: FAMILY MEDICINE
Payer: COMMERCIAL

## 2023-12-28 VITALS — WEIGHT: 238 LBS | HEIGHT: 72 IN | BODY MASS INDEX: 32.23 KG/M2

## 2023-12-28 DIAGNOSIS — Z12.31 SCREENING MAMMOGRAM FOR HIGH-RISK PATIENT: ICD-10-CM

## 2023-12-28 DIAGNOSIS — Z85.3 HISTORY OF RIGHT BREAST CANCER: ICD-10-CM

## 2023-12-28 PROCEDURE — G0279 TOMOSYNTHESIS, MAMMO: HCPCS

## 2023-12-28 NOTE — TELEPHONE ENCOUNTER
Jazmin with [facility] called requesting a order for pt's annual Diagnostic Bilateral Mammogram Screening. Best call back number [phone] Fax:[phone]

## 2023-12-28 NOTE — TELEPHONE ENCOUNTER
Called the [facility] . Left voice message to call us back at [phone].

## 2023-12-29 NOTE — PROGRESS NOTES
Left message with Vane Reeder 12/28 and Zee 12/29 requesting script for 12/28/2023 mammogram at [facility].    Fax received and scanned 12/29/2023

## 2024-02-12 ENCOUNTER — TELEPHONE (OUTPATIENT)
Age: 59
End: 2024-02-12

## 2024-02-12 ENCOUNTER — OFFICE VISIT (OUTPATIENT)
Age: 59
End: 2024-02-12
Payer: COMMERCIAL

## 2024-02-12 VITALS
OXYGEN SATURATION: 100 % | TEMPERATURE: 97.3 F | RESPIRATION RATE: 14 BRPM | DIASTOLIC BLOOD PRESSURE: 84 MMHG | BODY MASS INDEX: 34.57 KG/M2 | WEIGHT: 255.2 LBS | SYSTOLIC BLOOD PRESSURE: 130 MMHG | HEIGHT: 72 IN | HEART RATE: 77 BPM

## 2024-02-12 DIAGNOSIS — J32.9 SINOBRONCHITIS: Primary | ICD-10-CM

## 2024-02-12 DIAGNOSIS — J40 SINOBRONCHITIS: Primary | ICD-10-CM

## 2024-02-12 PROBLEM — K57.30 DIVERTICULOSIS OF LARGE INTESTINE WITHOUT PERFORATION OR ABSCESS WITHOUT BLEEDING: Status: ACTIVE | Noted: 2023-08-24

## 2024-02-12 PROCEDURE — 99213 OFFICE O/P EST LOW 20 MIN: CPT | Performed by: FAMILY MEDICINE

## 2024-02-12 RX ORDER — NAPROXEN 500 MG/1
500 TABLET ORAL 2 TIMES DAILY WITH MEALS
Qty: 14 TABLET | Refills: 0 | Status: SHIPPED | OUTPATIENT
Start: 2024-02-12 | End: 2024-02-19

## 2024-02-12 RX ORDER — AZELASTINE 1 MG/ML
1 SPRAY, METERED NASAL 2 TIMES DAILY
Qty: 60 ML | Refills: 1 | Status: SHIPPED | OUTPATIENT
Start: 2024-02-12

## 2024-02-12 RX ORDER — DOXYCYCLINE HYCLATE 100 MG
100 TABLET ORAL 2 TIMES DAILY
Qty: 14 TABLET | Refills: 0 | Status: SHIPPED | OUTPATIENT
Start: 2024-02-12 | End: 2024-02-19

## 2024-02-12 NOTE — PROGRESS NOTES
Chief Complaint   Patient presents with    Cough     X 2 weeks productive on/off. Chest pain with coughing. Pt's daughter had flu     \"Have you been to the ER, urgent care clinic since your last visit?  Hospitalized since your last visit?\"    NO    “Have you seen or consulted any other health care providers outside of [facility] since your last visit?”    NO                   2/12/2024     3:07 PM   PHQ-9    Little interest or pleasure in doing things 0   Feeling down, depressed, or hopeless 0   PHQ-2 Score 0   PHQ-9 Total Score 0           Financial Resource Strain: Low Risk  (6/8/2023)    Overall Financial Resource Strain (CARDIA)     Difficulty of Paying Living Expenses: Not hard at all      Food Insecurity: Not on file (6/8/2023)          Health Maintenance Due   Topic Date Due    DTaP/Tdap/Td vaccine (1 - Tdap) Never done    Shingles vaccine (1 of 2) Never done    Respiratory Syncytial Virus (RSV) Pregnant or age 60 yrs+ (1 - 1-dose 60+ series) Never done    Flu vaccine (1) 08/01/2023    COVID-19 Vaccine (3 - 2023-24 season) 09/01/2023        
instructions I provided and expressed understanding of discussion that was held today.    Please note that this dictation was completed with [employer], the computer voice recognition software.  Quite often unanticipated grammatical, syntax, homophones, and other interpretive errors are inadvertently transcribed by the computer software.  Please disregard these errors.  Please excuse any errors that have escaped final proofreading.  Thank you.     No follow-ups on file.     Electronically signed by Bing Murillo MD on 2/12/24 at 5:30 PM EST

## 2024-02-12 NOTE — TELEPHONE ENCOUNTER
Called patient. Name and  verified. Scheduled for today at 3:00pm. pt had cough x 2 weeks productive. Negative covid test

## 2024-02-12 NOTE — TELEPHONE ENCOUNTER
Patient is requesting a call from nurse. I asked for reason, but she stated she wants to just speak with nurse. Best call back number is the mobile on file.

## 2024-06-11 ENCOUNTER — OFFICE VISIT (OUTPATIENT)
Age: 59
End: 2024-06-11
Payer: COMMERCIAL

## 2024-06-11 VITALS
RESPIRATION RATE: 14 BRPM | BODY MASS INDEX: 34.24 KG/M2 | WEIGHT: 252.8 LBS | HEIGHT: 72 IN | OXYGEN SATURATION: 97 % | TEMPERATURE: 97.5 F | DIASTOLIC BLOOD PRESSURE: 70 MMHG | SYSTOLIC BLOOD PRESSURE: 134 MMHG | HEART RATE: 63 BPM

## 2024-06-11 DIAGNOSIS — Z00.00 ENCOUNTER FOR PREVENTATIVE ADULT HEALTH CARE EXAMINATION: Primary | ICD-10-CM

## 2024-06-11 DIAGNOSIS — E03.9 HYPOTHYROIDISM, ADULT: ICD-10-CM

## 2024-06-11 PROCEDURE — 99396 PREV VISIT EST AGE 40-64: CPT | Performed by: FAMILY MEDICINE

## 2024-06-11 SDOH — ECONOMIC STABILITY: FOOD INSECURITY: WITHIN THE PAST 12 MONTHS, YOU WORRIED THAT YOUR FOOD WOULD RUN OUT BEFORE YOU GOT MONEY TO BUY MORE.: NEVER TRUE

## 2024-06-11 SDOH — ECONOMIC STABILITY: FOOD INSECURITY: WITHIN THE PAST 12 MONTHS, THE FOOD YOU BOUGHT JUST DIDN'T LAST AND YOU DIDN'T HAVE MONEY TO GET MORE.: NEVER TRUE

## 2024-06-11 SDOH — ECONOMIC STABILITY: INCOME INSECURITY: HOW HARD IS IT FOR YOU TO PAY FOR THE VERY BASICS LIKE FOOD, HOUSING, MEDICAL CARE, AND HEATING?: NOT HARD AT ALL

## 2024-06-11 ASSESSMENT — ENCOUNTER SYMPTOMS
COUGH: 0
DIARRHEA: 0
NAUSEA: 0
BACK PAIN: 0
CONSTIPATION: 0
SHORTNESS OF BREATH: 0
SINUS PAIN: 0
ABDOMINAL PAIN: 0

## 2024-06-11 ASSESSMENT — PATIENT HEALTH QUESTIONNAIRE - PHQ9
SUM OF ALL RESPONSES TO PHQ QUESTIONS 1-9: 0
SUM OF ALL RESPONSES TO PHQ9 QUESTIONS 1 & 2: 0
SUM OF ALL RESPONSES TO PHQ QUESTIONS 1-9: 0
1. LITTLE INTEREST OR PLEASURE IN DOING THINGS: NOT AT ALL

## 2024-06-11 NOTE — PROGRESS NOTES
Chief Complaint   Patient presents with    Annual Exam     \"Have you been to the ER, urgent care clinic since your last visit?  Hospitalized since your last visit?\"    NO    “Have you seen or consulted any other health care providers outside of [facility] since your last visit?”    NO                   2/12/2024     3:07 PM   PHQ-9    Little interest or pleasure in doing things 0   Feeling down, depressed, or hopeless 0   PHQ-2 Score 0   PHQ-9 Total Score 0           Financial Resource Strain: Low Risk  (6/11/2024)    Overall Financial Resource Strain (CARDIA)     Difficulty of Paying Living Expenses: Not hard at all      Food Insecurity: No Food Insecurity (6/11/2024)    Hunger Vital Sign     Worried About Running Out of Food in the Last Year: Never true     Ran Out of Food in the Last Year: Never true          Health Maintenance Due   Topic Date Due    DTaP/Tdap/Td vaccine (1 - Tdap) Never done    Shingles vaccine (1 of 2) Never done    COVID-19 Vaccine (3 - 2023-24 season) 09/01/2023

## 2024-06-11 NOTE — PROGRESS NOTES
Date:6/11/2024        Patient Name:Angel Perdomo     YOB: 1965     Age:59 y.o.    Seen today for   Chief Complaint   Patient presents with    Annual Exam     Seen today for annual physical checkup and to update health maintenance.  She had mammogram in December and colonoscopy last August and results for both were very reassuring.  No change in family history, personal history, surgical history since last office visit.  HPI   Health Maintenance  Immunizations:   COVID: declined  Influenza: up to date   Tetanus:  As per patient she had at the emergency department 2 years ago after accidental injury.  Results requested     Shingles: declined   Pneumonia: n/a    Cancer screening:   Cervical: reviewed guidelines, UTD, done by OBGYN, records requested.    Breast: reviewed guidelines, up to date.    Colon: reviewed guidelines, up to date.    Patient Care Team:  Bing Murillo MD as PCP - General  Bing Murillo MD as PCP - Empaneled Provider      The following sections were reviewed & updated as appropriate: PMH, PSH, FH, and SH.        Review of Systems   Review of Systems   Constitutional:  Negative for fatigue and fever.   HENT:  Negative for congestion, ear pain and sinus pain.    Respiratory:  Negative for cough and shortness of breath.    Cardiovascular:  Negative for chest pain and leg swelling.   Gastrointestinal:  Negative for abdominal pain, constipation, diarrhea and nausea.   Genitourinary:  Negative for dysuria and menstrual problem.   Musculoskeletal:  Negative for back pain.   Neurological:  Negative for dizziness and light-headedness.   Psychiatric/Behavioral:  Negative for behavioral problems.        Medications     Current Outpatient Medications   Medication Sig Dispense Refill    acetaminophen (TYLENOL) 325 MG tablet Take by mouth every 4 hours as needed      Cholecalciferol 50 MCG (2000 UT) TABS Take by mouth      azelastine (ASTELIN) 0.1 % nasal spray 1 spray by Nasal route 2 times

## 2024-06-12 LAB
ALBUMIN SERPL-MCNC: 3.9 G/DL (ref 3.9–4.9)
ALBUMIN/GLOB SERPL: 1.3 {RATIO}
ALP SERPL-CCNC: 86 IU/L (ref 44–121)
ALT SERPL-CCNC: 11 IU/L (ref 0–32)
APPEARANCE UR: CLEAR
AST SERPL-CCNC: 12 IU/L (ref 0–40)
BACTERIA #/AREA URNS HPF: NORMAL /[HPF]
BASOPHILS # BLD AUTO: 0 X10E3/UL (ref 0–0.2)
BASOPHILS NFR BLD AUTO: 1 %
BILIRUB SERPL-MCNC: 0.7 MG/DL (ref 0–1.2)
BILIRUB UR QL STRIP: NEGATIVE
BUN SERPL-MCNC: 10 MG/DL (ref 8–27)
BUN/CREAT SERPL: 13 (ref 12–28)
CALCIUM SERPL-MCNC: 9 MG/DL (ref 8.7–10.3)
CASTS URNS QL MICRO: NORMAL /LPF
CHLORIDE SERPL-SCNC: 105 MMOL/L (ref 96–106)
CHOLEST SERPL-MCNC: 207 MG/DL (ref 100–199)
CO2 SERPL-SCNC: 24 MMOL/L (ref 20–29)
COLOR UR: YELLOW
CREAT SERPL-MCNC: 0.76 MG/DL (ref 0.57–1)
EGFRCR SERPLBLD CKD-EPI 2021: 89 ML/MIN/1.73
EOSINOPHIL # BLD AUTO: 0.3 X10E3/UL (ref 0–0.4)
EOSINOPHIL NFR BLD AUTO: 6 %
EPI CELLS #/AREA URNS HPF: NORMAL /HPF (ref 0–10)
ERYTHROCYTE [DISTWIDTH] IN BLOOD BY AUTOMATED COUNT: 12.7 % (ref 11.7–15.4)
GLOBULIN SER CALC-MCNC: 3.1 G/DL (ref 1.5–4.5)
GLUCOSE SERPL-MCNC: 91 MG/DL (ref 70–99)
GLUCOSE UR QL STRIP: NEGATIVE
HBA1C MFR BLD: 5.7 % (ref 4.8–5.6)
HCT VFR BLD AUTO: 41.2 % (ref 34–46.6)
HDLC SERPL-MCNC: 53 MG/DL
HGB BLD-MCNC: 13.5 G/DL (ref 11.1–15.9)
HGB UR QL STRIP: NEGATIVE
IMM GRANULOCYTES # BLD AUTO: 0 X10E3/UL (ref 0–0.1)
IMM GRANULOCYTES NFR BLD AUTO: 0 %
IMP & REVIEW OF LAB RESULTS: NORMAL
KETONES UR QL STRIP: NEGATIVE
LDLC SERPL CALC-MCNC: 142 MG/DL (ref 0–99)
LEUKOCYTE ESTERASE UR QL STRIP: NEGATIVE
LYMPHOCYTES # BLD AUTO: 1.7 X10E3/UL (ref 0.7–3.1)
LYMPHOCYTES NFR BLD AUTO: 39 %
MCH RBC QN AUTO: 31.5 PG (ref 26.6–33)
MCHC RBC AUTO-ENTMCNC: 32.8 G/DL (ref 31.5–35.7)
MCV RBC AUTO: 96 FL (ref 79–97)
MICRO URNS: ABNORMAL
MICRO URNS: ABNORMAL
MONOCYTES # BLD AUTO: 0.4 X10E3/UL (ref 0.1–0.9)
MONOCYTES NFR BLD AUTO: 9 %
NEUTROPHILS # BLD AUTO: 1.9 X10E3/UL (ref 1.4–7)
NEUTROPHILS NFR BLD AUTO: 45 %
NITRITE UR QL STRIP: NEGATIVE
PH UR STRIP: 8 [PH] (ref 5–7.5)
PLATELET # BLD AUTO: 250 X10E3/UL (ref 150–450)
POTASSIUM SERPL-SCNC: 4.2 MMOL/L (ref 3.5–5.2)
PROT SERPL-MCNC: 7 G/DL (ref 6–8.5)
PROT UR QL STRIP: NEGATIVE
RBC # BLD AUTO: 4.28 X10E6/UL (ref 3.77–5.28)
RBC #/AREA URNS HPF: NORMAL /HPF (ref 0–2)
SODIUM SERPL-SCNC: 140 MMOL/L (ref 134–144)
SP GR UR STRIP: 1.02 (ref 1–1.03)
TRIGL SERPL-MCNC: 69 MG/DL (ref 0–149)
TSH SERPL DL<=0.005 MIU/L-ACNC: 3.81 UIU/ML (ref 0.45–4.5)
UROBILINOGEN UR STRIP-MCNC: 0.2 MG/DL (ref 0.2–1)
VLDLC SERPL CALC-MCNC: 12 MG/DL (ref 5–40)
WBC # BLD AUTO: 4.2 X10E3/UL (ref 3.4–10.8)
WBC #/AREA URNS HPF: NORMAL /HPF (ref 0–5)

## 2024-10-02 ENCOUNTER — OFFICE VISIT (OUTPATIENT)
Age: 59
End: 2024-10-02
Payer: COMMERCIAL

## 2024-10-02 VITALS
HEIGHT: 72 IN | HEART RATE: 67 BPM | TEMPERATURE: 97 F | DIASTOLIC BLOOD PRESSURE: 80 MMHG | WEIGHT: 259 LBS | OXYGEN SATURATION: 99 % | RESPIRATION RATE: 16 BRPM | SYSTOLIC BLOOD PRESSURE: 129 MMHG | BODY MASS INDEX: 35.08 KG/M2

## 2024-10-02 DIAGNOSIS — J32.9 SINOBRONCHITIS: Primary | ICD-10-CM

## 2024-10-02 DIAGNOSIS — Z23 NEEDS FLU SHOT: ICD-10-CM

## 2024-10-02 DIAGNOSIS — J40 SINOBRONCHITIS: Primary | ICD-10-CM

## 2024-10-02 PROCEDURE — 90471 IMMUNIZATION ADMIN: CPT | Performed by: FAMILY MEDICINE

## 2024-10-02 PROCEDURE — 90661 CCIIV3 VAC ABX FR 0.5 ML IM: CPT | Performed by: FAMILY MEDICINE

## 2024-10-02 PROCEDURE — 99213 OFFICE O/P EST LOW 20 MIN: CPT | Performed by: FAMILY MEDICINE

## 2024-10-02 RX ORDER — BENZONATATE 200 MG/1
200 CAPSULE ORAL 3 TIMES DAILY PRN
Qty: 20 CAPSULE | Refills: 0 | Status: SHIPPED | OUTPATIENT
Start: 2024-10-02 | End: 2024-10-09

## 2024-10-02 ASSESSMENT — ENCOUNTER SYMPTOMS
CHEST TIGHTNESS: 0
BACK PAIN: 0
CONSTIPATION: 0
SORE THROAT: 0
DIARRHEA: 0
SHORTNESS OF BREATH: 0
SINUS PAIN: 1
ABDOMINAL PAIN: 0
SINUS PRESSURE: 1
COUGH: 1

## 2024-10-02 ASSESSMENT — PATIENT HEALTH QUESTIONNAIRE - PHQ9
SUM OF ALL RESPONSES TO PHQ9 QUESTIONS 1 & 2: 0
SUM OF ALL RESPONSES TO PHQ QUESTIONS 1-9: 0
2. FEELING DOWN, DEPRESSED OR HOPELESS: NOT AT ALL
SUM OF ALL RESPONSES TO PHQ QUESTIONS 1-9: 0
SUM OF ALL RESPONSES TO PHQ QUESTIONS 1-9: 0
1. LITTLE INTEREST OR PLEASURE IN DOING THINGS: NOT AT ALL
SUM OF ALL RESPONSES TO PHQ QUESTIONS 1-9: 0

## 2024-10-02 NOTE — PROGRESS NOTES
Chief Complaint   Patient presents with    Cough     Productive with yellow mucus. Pt took nyquil    Chest Congestion     X 2 weeks         \"Have you been to the ER, urgent care clinic since your last visit?  Hospitalized since your last visit?\"    NO    “Have you seen or consulted any other health care providers outside of [facility] since your last visit?”    NO            Click Here for Release of Records Request           10/2/2024    10:37 AM   PHQ-9    Little interest or pleasure in doing things 0   Feeling down, depressed, or hopeless 0   PHQ-2 Score 0   PHQ-9 Total Score 0           Financial Resource Strain: Low Risk  (6/11/2024)    Overall Financial Resource Strain (CARDIA)     Difficulty of Paying Living Expenses: Not hard at all      Food Insecurity: No Food Insecurity (6/11/2024)    Hunger Vital Sign     Worried About Running Out of Food in the Last Year: Never true     Ran Out of Food in the Last Year: Never true          Health Maintenance Due   Topic Date Due    DTaP/Tdap/Td vaccine (1 - Tdap) Never done    Flu vaccine (1) 08/01/2024    COVID-19 Vaccine (3 - 2023-24 season) 09/01/2024

## 2024-10-02 NOTE — PROGRESS NOTES
Date:10/2/2024        Patient Name:Angel Perdomo     YOB: 1965     Age:59 y.o.    Seen today for   Chief Complaint   Patient presents with    Cough     Productive with yellow mucus. Pt took nyquil    Chest Congestion     X 2 weeks   The patient (or guardian, if applicable) and other individuals in attendance with the patient were advised that Artificial Intelligence will be utilized during this visit to record, process the conversation to generate a clinical note, and support improvement of the AI technology. The patient (or guardian, if applicable) and other individuals in attendance at the appointment consented to the use of AI, including the recording.        HPI     History of Present Illness  The patient presents for evaluation of a cough.    Two weeks ago, she experienced a cold accompanied by a headache, fever, and sweating. She took NyQuil and Tylenol, which alleviated these symptoms. However, she developed a cough producing yellow phlegm, which has since ceased. Currently, she is experiencing chest congestion and reports shortness of breath. His cough intensifies when lying down and during the night, occurring 3 to 4 times.    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 He has not received his influenza vaccine. He is not currently using any nasal spray as his runny nose has stopped.        Review of Systems   Review of Systems   Constitutional:  Positive for chills and fatigue. Negative for fever.   HENT:  Positive for congestion, postnasal drip, sinus pressure and sinus pain. Negative for ear pain and sore throat.    Respiratory:  Positive for cough. Negative for chest tightness and shortness of breath.    Cardiovascular:  Negative for chest pain and palpitations.   Gastrointestinal:  Negative for abdominal pain, constipation and diarrhea.   Genitourinary:  Negative for dysuria.   Musculoskeletal:  Negative for back pain, myalgias and neck pain.   Skin:  Negative for

## 2024-11-06 ENCOUNTER — TRANSCRIBE ORDERS (OUTPATIENT)
Facility: HOSPITAL | Age: 59
End: 2024-11-06

## 2024-11-06 DIAGNOSIS — Z12.31 OTHER SCREENING MAMMOGRAM: Primary | ICD-10-CM

## 2024-11-12 ENCOUNTER — TELEPHONE (OUTPATIENT)
Age: 59
End: 2024-11-12

## 2024-11-12 DIAGNOSIS — Z12.31 ENCOUNTER FOR SCREENING MAMMOGRAM FOR HIGH-RISK PATIENT: Primary | ICD-10-CM

## 2024-11-12 NOTE — TELEPHONE ENCOUNTER
Pt called wanting to speak with Dr. Murillo. Pt is requesting for a call back.        BCBN [phone]

## 2024-11-12 NOTE — TELEPHONE ENCOUNTER
Outbound call to patient. Name and  verified. Pt stated that she called to schedule her mammogram but they told her that the order needs to be resent by the provider

## 2024-12-11 ENCOUNTER — OFFICE VISIT (OUTPATIENT)
Age: 59
End: 2024-12-11
Payer: COMMERCIAL

## 2024-12-11 VITALS
WEIGHT: 257 LBS | BODY MASS INDEX: 34.81 KG/M2 | HEIGHT: 72 IN | RESPIRATION RATE: 15 BRPM | SYSTOLIC BLOOD PRESSURE: 122 MMHG | OXYGEN SATURATION: 99 % | DIASTOLIC BLOOD PRESSURE: 80 MMHG | TEMPERATURE: 97.3 F | HEART RATE: 67 BPM

## 2024-12-11 DIAGNOSIS — E03.9 HYPOTHYROIDISM, ADULT: Primary | ICD-10-CM

## 2024-12-11 DIAGNOSIS — E78.5 DYSLIPIDEMIA, GOAL LDL BELOW 130: ICD-10-CM

## 2024-12-11 DIAGNOSIS — M16.11 PRIMARY OSTEOARTHRITIS OF RIGHT HIP: ICD-10-CM

## 2024-12-11 DIAGNOSIS — Z71.84 TRAVEL ADVICE ENCOUNTER: ICD-10-CM

## 2024-12-11 DIAGNOSIS — Z86.000 HISTORY OF DUCTAL CARCINOMA IN SITU (DCIS) OF BREAST: ICD-10-CM

## 2024-12-11 DIAGNOSIS — D64.9 ANEMIA, UNSPECIFIED TYPE: ICD-10-CM

## 2024-12-11 PROCEDURE — 99214 OFFICE O/P EST MOD 30 MIN: CPT | Performed by: FAMILY MEDICINE

## 2024-12-11 RX ORDER — MELOXICAM 15 MG/1
15 TABLET ORAL DAILY
Qty: 60 TABLET | Refills: 0 | Status: SHIPPED | OUTPATIENT
Start: 2024-12-11

## 2024-12-11 RX ORDER — AZITHROMYCIN 500 MG/1
500 TABLET, FILM COATED ORAL DAILY
Qty: 3 TABLET | Refills: 0 | Status: SHIPPED | OUTPATIENT
Start: 2024-12-11 | End: 2024-12-14

## 2024-12-11 RX ORDER — MEFLOQUINE HYDROCHLORIDE 250 MG/1
250 TABLET ORAL
Qty: 6 TABLET | Refills: 0 | Status: SHIPPED | OUTPATIENT
Start: 2024-12-11 | End: 2025-01-16

## 2024-12-11 RX ORDER — ONDANSETRON 4 MG/1
4 TABLET, ORALLY DISINTEGRATING ORAL 3 TIMES DAILY PRN
Qty: 21 TABLET | Refills: 0 | Status: SHIPPED | OUTPATIENT
Start: 2024-12-11

## 2024-12-11 ASSESSMENT — PATIENT HEALTH QUESTIONNAIRE - PHQ9
1. LITTLE INTEREST OR PLEASURE IN DOING THINGS: NOT AT ALL
SUM OF ALL RESPONSES TO PHQ QUESTIONS 1-9: 0
SUM OF ALL RESPONSES TO PHQ9 QUESTIONS 1 & 2: 0
2. FEELING DOWN, DEPRESSED OR HOPELESS: NOT AT ALL

## 2024-12-11 ASSESSMENT — ENCOUNTER SYMPTOMS
SORE THROAT: 0
ABDOMINAL PAIN: 0
CHEST TIGHTNESS: 0
SHORTNESS OF BREATH: 0
BACK PAIN: 0
CONSTIPATION: 0
DIARRHEA: 0
COUGH: 0

## 2024-12-11 NOTE — PROGRESS NOTES
Chief Complaint   Patient presents with    Cholesterol Problem     Follow  up    Hypothyroidism     Follow up         \"Have you been to the ER, urgent care clinic since your last visit?  Hospitalized since your last visit?\"    NO    “Have you seen or consulted any other health care providers outside of [facility] since your last visit?”    NO            Click Here for Release of Records Request           12/11/2024     8:36 AM   PHQ-9    Little interest or pleasure in doing things 0   Feeling down, depressed, or hopeless 0   PHQ-2 Score 0   PHQ-9 Total Score 0           Financial Resource Strain: Low Risk  (6/11/2024)    Overall Financial Resource Strain (CARDIA)     Difficulty of Paying Living Expenses: Not hard at all      Food Insecurity: No Food Insecurity (6/11/2024)    Hunger Vital Sign     Worried About Running Out of Food in the Last Year: Never true     Ran Out of Food in the Last Year: Never true          Health Maintenance Due   Topic Date Due    DTaP/Tdap/Td vaccine (1 - Tdap) Never done    COVID-19 Vaccine (3 - 2023-24 season) 09/01/2024    Breast cancer screen  12/28/2024

## 2024-12-11 NOTE — PROGRESS NOTES
Date:12/11/2024        Patient Name:Angel Perdomo     YOB: 1965     Age:59 y.o.  The patient (or guardian, if applicable) and other individuals in attendance with the patient were advised that Artificial Intelligence will be utilized during this visit to record, process the conversation to generate a clinical note, and support improvement of the AI technology. The patient (or guardian, if applicable) and other individuals in attendance at the appointment consented to the use of AI, including the recording.        Seen today for   Chief Complaint   Patient presents with    Cholesterol Problem     Follow  up    Hypothyroidism     Follow up       HPI     History of Present Illness  The patient presents for a 6-month follow-up appointment.  Past medical history significant for dyslipidemia, hypothyroidism, history of ductal carcinoma in situ.    She has been experiencing pain in her hip, which radiates to her back, making it difficult for her to sit or lie down. She sought advice from a nurse at the [employer] where she works, who recommended that she consult with her primary care physician. She is not currently on any medications and reports no known drug allergies. She has not had any changes in her health status over the past 6 months, except for one emergency department visit due to a chemical spill in her eyes. She has not had any other emergency department visits. She has received her influenza vaccine and is up to date with her Pap smear. She has not received the COVID-19 vaccine. She had a consultation with Dr. De Luna in 08/2024. She is planning to travel to [place] on 12/28/2024 and will return on 02/03/2025. She is requesting medication for traveler's diarrhea and malaria, as well as medication for nausea, as she tends to vomit when exposed to certain smells during travel.    Supplemental Information  She had a chemical conjunctivitis on 11/04/2024 and went to [place] or

## 2024-12-12 ENCOUNTER — TELEPHONE (OUTPATIENT)
Age: 59
End: 2024-12-12

## 2024-12-12 DIAGNOSIS — Z71.84 TRAVEL ADVICE ENCOUNTER: Primary | ICD-10-CM

## 2024-12-12 LAB
ALBUMIN SERPL-MCNC: 4 G/DL (ref 3.9–4.9)
ALP SERPL-CCNC: 82 IU/L (ref 44–121)
ALT SERPL-CCNC: 10 IU/L (ref 0–32)
AST SERPL-CCNC: 14 IU/L (ref 0–40)
BASOPHILS # BLD AUTO: 0 X10E3/UL (ref 0–0.2)
BASOPHILS NFR BLD AUTO: 0 %
BILIRUB SERPL-MCNC: 0.9 MG/DL (ref 0–1.2)
BUN SERPL-MCNC: 8 MG/DL (ref 8–27)
BUN/CREAT SERPL: 12 (ref 12–28)
CALCIUM SERPL-MCNC: 9 MG/DL (ref 8.7–10.3)
CHLORIDE SERPL-SCNC: 107 MMOL/L (ref 96–106)
CHOLEST SERPL-MCNC: 202 MG/DL (ref 100–199)
CO2 SERPL-SCNC: 26 MMOL/L (ref 20–29)
CREAT SERPL-MCNC: 0.65 MG/DL (ref 0.57–1)
EGFRCR SERPLBLD CKD-EPI 2021: 100 ML/MIN/1.73
EOSINOPHIL # BLD AUTO: 0.4 X10E3/UL (ref 0–0.4)
EOSINOPHIL NFR BLD AUTO: 8 %
ERYTHROCYTE [DISTWIDTH] IN BLOOD BY AUTOMATED COUNT: 12.6 % (ref 11.7–15.4)
GLOBULIN SER CALC-MCNC: 2.8 G/DL (ref 1.5–4.5)
GLUCOSE SERPL-MCNC: 79 MG/DL (ref 70–99)
HCT VFR BLD AUTO: 40.3 % (ref 34–46.6)
HDLC SERPL-MCNC: 50 MG/DL
HGB BLD-MCNC: 13.2 G/DL (ref 11.1–15.9)
IMM GRANULOCYTES # BLD AUTO: 0 X10E3/UL (ref 0–0.1)
IMM GRANULOCYTES NFR BLD AUTO: 0 %
IMP & REVIEW OF LAB RESULTS: NORMAL
LDLC SERPL CALC-MCNC: 140 MG/DL (ref 0–99)
LYMPHOCYTES # BLD AUTO: 1.6 X10E3/UL (ref 0.7–3.1)
LYMPHOCYTES NFR BLD AUTO: 35 %
MCH RBC QN AUTO: 32.4 PG (ref 26.6–33)
MCHC RBC AUTO-ENTMCNC: 32.8 G/DL (ref 31.5–35.7)
MCV RBC AUTO: 99 FL (ref 79–97)
MONOCYTES # BLD AUTO: 0.4 X10E3/UL (ref 0.1–0.9)
MONOCYTES NFR BLD AUTO: 9 %
NEUTROPHILS # BLD AUTO: 2.2 X10E3/UL (ref 1.4–7)
NEUTROPHILS NFR BLD AUTO: 48 %
PLATELET # BLD AUTO: 253 X10E3/UL (ref 150–450)
POTASSIUM SERPL-SCNC: 4.2 MMOL/L (ref 3.5–5.2)
PROT SERPL-MCNC: 6.8 G/DL (ref 6–8.5)
RBC # BLD AUTO: 4.08 X10E6/UL (ref 3.77–5.28)
SODIUM SERPL-SCNC: 146 MMOL/L (ref 134–144)
TRIGL SERPL-MCNC: 67 MG/DL (ref 0–149)
TSH SERPL DL<=0.005 MIU/L-ACNC: 4.37 UIU/ML (ref 0.45–4.5)
VLDLC SERPL CALC-MCNC: 12 MG/DL (ref 5–40)
WBC # BLD AUTO: 4.6 X10E3/UL (ref 3.4–10.8)

## 2024-12-12 RX ORDER — ATOVAQUONE AND PROGUANIL HYDROCHLORIDE 250; 100 MG/1; MG/1
1 TABLET, FILM COATED ORAL DAILY
Qty: 40 TABLET | Refills: 0 | Status: SHIPPED | OUTPATIENT
Start: 2024-12-12

## 2024-12-13 NOTE — RESULT ENCOUNTER NOTE
Please let patient know that  Results for cholesterol, thyroid function and blood count are persistently in normal range.  Kidney and liver functions are very normal except borderline elevation of sodium and chloride.  She does need to drink more water and limit high salt diet.  Thanks

## 2025-02-04 ENCOUNTER — HOSPITAL ENCOUNTER (OUTPATIENT)
Facility: HOSPITAL | Age: 60
Discharge: HOME | End: 2025-02-07
Attending: FAMILY MEDICINE
Payer: COMMERCIAL

## 2025-02-04 VITALS — HEIGHT: 72 IN | BODY MASS INDEX: 33.86 KG/M2 | WEIGHT: 250 LBS

## 2025-02-04 DIAGNOSIS — Z12.31 ENCOUNTER FOR SCREENING MAMMOGRAM FOR HIGH-RISK PATIENT: ICD-10-CM

## 2025-02-04 PROCEDURE — 77063 BREAST TOMOSYNTHESIS BI: CPT

## 2025-03-13 ENCOUNTER — TELEPHONE (OUTPATIENT)
Age: 60
End: 2025-03-13

## 2025-03-13 DIAGNOSIS — M16.11 PRIMARY OSTEOARTHRITIS OF RIGHT HIP: ICD-10-CM

## 2025-03-13 RX ORDER — MELOXICAM 15 MG/1
15 TABLET ORAL DAILY
Qty: 90 TABLET | Refills: 0 | Status: SHIPPED | OUTPATIENT
Start: 2025-03-13

## 2025-03-13 NOTE — TELEPHONE ENCOUNTER
Outbound call to patient. Name and  verified. Pt stated she has been having leg pain. She takes meloxicam as needed pain goes away but comes back. Talked to pcp and advised to take meloxicam 15mg one tablet regularly, if not better to call and schedule an appointment. She stated understanding.

## 2025-04-16 ENCOUNTER — TELEPHONE (OUTPATIENT)
Age: 60
End: 2025-04-16

## 2025-04-16 NOTE — TELEPHONE ENCOUNTER
Patient called stated that she has a lot of leg and thigh pain first available is 04/30/2025.  Would like for the nurse to give a call.    Requesting a call back    Best call back # [phone]

## 2025-04-17 ENCOUNTER — OFFICE VISIT (OUTPATIENT)
Age: 60
End: 2025-04-17
Payer: COMMERCIAL

## 2025-04-17 VITALS
TEMPERATURE: 97 F | RESPIRATION RATE: 16 BRPM | WEIGHT: 257 LBS | HEART RATE: 60 BPM | SYSTOLIC BLOOD PRESSURE: 121 MMHG | OXYGEN SATURATION: 99 % | BODY MASS INDEX: 34.81 KG/M2 | DIASTOLIC BLOOD PRESSURE: 75 MMHG | HEIGHT: 72 IN

## 2025-04-17 DIAGNOSIS — M16.11 PRIMARY OSTEOARTHRITIS OF RIGHT HIP: Primary | ICD-10-CM

## 2025-04-17 DIAGNOSIS — M46.1 ARTHRITIS OF RIGHT SACROILIAC JOINT: ICD-10-CM

## 2025-04-17 DIAGNOSIS — G57.01 PYRIFORMIS SYNDROME, RIGHT: ICD-10-CM

## 2025-04-17 DIAGNOSIS — H04.123 DRY EYE SYNDROME OF BOTH EYES: ICD-10-CM

## 2025-04-17 DIAGNOSIS — M54.16 LUMBAR RADICULOPATHY: ICD-10-CM

## 2025-04-17 PROCEDURE — 99213 OFFICE O/P EST LOW 20 MIN: CPT | Performed by: FAMILY MEDICINE

## 2025-04-17 RX ORDER — IBUPROFEN 800 MG/1
800 TABLET, FILM COATED ORAL
Qty: 30 TABLET | Refills: 0 | Status: SHIPPED | OUTPATIENT
Start: 2025-04-17 | End: 2025-04-27

## 2025-04-17 RX ORDER — METHOCARBAMOL 750 MG/1
750 TABLET, FILM COATED ORAL 3 TIMES DAILY
Qty: 30 TABLET | Refills: 0 | Status: SHIPPED | OUTPATIENT
Start: 2025-04-17 | End: 2025-04-27

## 2025-04-17 SDOH — ECONOMIC STABILITY: FOOD INSECURITY: WITHIN THE PAST 12 MONTHS, THE FOOD YOU BOUGHT JUST DIDN'T LAST AND YOU DIDN'T HAVE MONEY TO GET MORE.: NEVER TRUE

## 2025-04-17 SDOH — ECONOMIC STABILITY: FOOD INSECURITY: WITHIN THE PAST 12 MONTHS, YOU WORRIED THAT YOUR FOOD WOULD RUN OUT BEFORE YOU GOT MONEY TO BUY MORE.: NEVER TRUE

## 2025-04-17 ASSESSMENT — ENCOUNTER SYMPTOMS
DIARRHEA: 0
EYE PAIN: 1
COUGH: 0
EYE ITCHING: 1
BACK PAIN: 1
SHORTNESS OF BREATH: 0
ABDOMINAL PAIN: 0
SORE THROAT: 0
NAUSEA: 0
CONSTIPATION: 0
WHEEZING: 0

## 2025-04-17 ASSESSMENT — PATIENT HEALTH QUESTIONNAIRE - PHQ9
1. LITTLE INTEREST OR PLEASURE IN DOING THINGS: NOT AT ALL
SUM OF ALL RESPONSES TO PHQ QUESTIONS 1-9: 0
2. FEELING DOWN, DEPRESSED OR HOPELESS: NOT AT ALL
SUM OF ALL RESPONSES TO PHQ QUESTIONS 1-9: 0

## 2025-04-17 NOTE — PROGRESS NOTES
Chief Complaint   Patient presents with    Leg Pain     Right thigh pain    Hip Pain     Radiating to the thigh and calf         \"Have you been to the ER, urgent care clinic since your last visit?  Hospitalized since your last visit?\"    NO    “Have you seen or consulted any other health care providers outside of [facility] since your last visit?”    NO            Click Here for Release of Records Request           4/17/2025    10:20 AM   PHQ-9    Little interest or pleasure in doing things 0   Feeling down, depressed, or hopeless 0   PHQ-2 Score 0   PHQ-9 Total Score 0           Financial Resource Strain: Low Risk  (6/11/2024)    Overall Financial Resource Strain (CARDIA)     Difficulty of Paying Living Expenses: Not hard at all      Food Insecurity: No Food Insecurity (4/17/2025)    Hunger Vital Sign     Worried About Running Out of Food in the Last Year: Never true     Ran Out of Food in the Last Year: Never true          Health Maintenance Due   Topic Date Due    DTaP/Tdap/Td vaccine (1 - Tdap) Never done    Pneumococcal 50+ years Vaccine (1 of 1 - PCV) Never done

## 2025-04-17 NOTE — PROGRESS NOTES
Date:4/17/2025        Patient Name:Angel Perdomo     YOB: 1965     Age:60 y.o.  The patient (or guardian, if applicable) and other individuals in attendance with the patient were advised that Artificial Intelligence will be utilized during this visit to record, process the conversation to generate a clinical note, and support improvement of the AI technology. The patient (or guardian, if applicable) and other individuals in attendance at the appointment consented to the use of AI, including the recording.        Seen today for   Chief Complaint   Patient presents with    Leg Pain     Right thigh pain    Hip Pain     Radiating to the thigh and calf       HPI     History of Present Illness  The patient presents for evaluation of right sacroiliac joint pain and and right buttock pain, radiating down to right upper leg and lower leg.    She reported experiencing hip pain prior to her travel on 12/11/2024, which was managed with medication. Upon her return on 02/02/2025, swelling in her feet was noted, accompanied by significant pain. The swelling has since subsided, but the pain persists. The pain originates from the right buttock and extends downwards into the foot. Prolonged sitting or standing exacerbates the pain, and numbness and tingling sensations in the foot are experienced after sitting. Currently, she is employed and is still working full-time.  Pain intensifies when at home but improves with ambulation. Vitamin D supplements are being taken.    Low Back Pain  Patient complains of chronic low back pain. This is evaluated as a personal injury. The patient first noted symptoms several weeks ago. It was not related to no known injury. The pain is rated severe, and is located at the right lumbar area, right sacroiliac area, right gluteal area, or radiating to right leg(s). The pain is described as stabbing or throbbing and occurs intermittently. The symptoms does not been progressive. Symptoms are

## 2025-04-21 ENCOUNTER — HOSPITAL ENCOUNTER (OUTPATIENT)
Age: 60
Discharge: HOME | End: 2025-04-24
Payer: COMMERCIAL

## 2025-04-21 DIAGNOSIS — M54.16 LUMBAR RADICULOPATHY: ICD-10-CM

## 2025-04-21 DIAGNOSIS — M46.1 ARTHRITIS OF RIGHT SACROILIAC JOINT: ICD-10-CM

## 2025-04-21 DIAGNOSIS — M16.11 PRIMARY OSTEOARTHRITIS OF RIGHT HIP: ICD-10-CM

## 2025-04-21 PROCEDURE — 72100 X-RAY EXAM L-S SPINE 2/3 VWS: CPT

## 2025-04-21 PROCEDURE — 73502 X-RAY EXAM HIP UNI 2-3 VIEWS: CPT

## 2025-04-25 ENCOUNTER — RESULTS FOLLOW-UP (OUTPATIENT)
Age: 60
End: 2025-04-25

## 2025-04-25 NOTE — RESULT ENCOUNTER NOTE
Please let patient know that x-ray of her lower back is very normal and ruling out any major arthritis.  She definitely has some changes of arthritis at L4-5 and L5-S1, lower vertebrae.  To continue with current medications and exercises I explained during office visit  Thanks

## 2025-04-26 NOTE — RESULT ENCOUNTER NOTE
Please let patient know that x-ray of hip is showing significant arthritis in both hips, right more than left.  So her pain is coming from hip arthritis and muscle spasm.  Needs to focus on hip arthritis exercises that I have printed out and to continue with medication, meloxicam once she completes her current muscle relaxer and ibuprofen.  We can refer her to physical therapy if she prefers.  Thanks

## 2025-06-12 ENCOUNTER — OFFICE VISIT (OUTPATIENT)
Age: 60
End: 2025-06-12
Payer: COMMERCIAL

## 2025-06-12 VITALS
TEMPERATURE: 97.3 F | HEART RATE: 64 BPM | OXYGEN SATURATION: 97 % | DIASTOLIC BLOOD PRESSURE: 83 MMHG | SYSTOLIC BLOOD PRESSURE: 122 MMHG | WEIGHT: 255 LBS | BODY MASS INDEX: 34.54 KG/M2 | RESPIRATION RATE: 18 BRPM | HEIGHT: 72 IN

## 2025-06-12 DIAGNOSIS — Z00.00 ENCOUNTER FOR PREVENTATIVE ADULT HEALTH CARE EXAMINATION: Primary | ICD-10-CM

## 2025-06-12 DIAGNOSIS — E55.9 VITAMIN D DEFICIENCY: ICD-10-CM

## 2025-06-12 LAB
COMMENT:: NORMAL
SPECIMEN HOLD: NORMAL

## 2025-06-12 PROCEDURE — 99396 PREV VISIT EST AGE 40-64: CPT | Performed by: FAMILY MEDICINE

## 2025-06-12 ASSESSMENT — ENCOUNTER SYMPTOMS
DIARRHEA: 0
BACK PAIN: 0
ABDOMINAL PAIN: 0
SORE THROAT: 0
SHORTNESS OF BREATH: 0
CHEST TIGHTNESS: 0
COUGH: 0
CONSTIPATION: 0

## 2025-06-12 NOTE — PROGRESS NOTES
Chief Complaint   Patient presents with    Annual Exam         \"Have you been to the ER, urgent care clinic since your last visit?  Hospitalized since your last visit?\"    NO    “Have you seen or consulted any other health care providers outside of [facility] since your last visit?”    NO     “Have you had a pap smear?”    NO    No cervical cancer screening on file             Click Here for Release of Records Request           4/17/2025    10:20 AM   PHQ-9    Little interest or pleasure in doing things 0   Feeling down, depressed, or hopeless 0   PHQ-2 Score 0   PHQ-9 Total Score 0           Financial Resource Strain: Low Risk  (6/11/2024)    Overall Financial Resource Strain (CARDIA)     Difficulty of Paying Living Expenses: Not hard at all      Food Insecurity: No Food Insecurity (4/17/2025)    Hunger Vital Sign     Worried About Running Out of Food in the Last Year: Never true     Ran Out of Food in the Last Year: Never true          Health Maintenance Due   Topic Date Due    DTaP/Tdap/Td vaccine (1 - Tdap) Never done    Shingles vaccine (1 of 2) Never done    Pneumococcal 50+ years Vaccine (1 of 1 - PCV) Never done    Cervical cancer screen  06/06/2025    A1C test (Diabetic or Prediabetic)  06/11/2025       
Osteoporosis.  Her x-ray indicates that her bones are diffusely demineralized, suggesting early signs of osteoporosis. She is advised to continue taking vitamin D and to start taking calcium supplements to strengthen her bones. A vitamin D level test will be conducted today.  Recommended daily weightbearing exercises    3. Arthritis.  Her x-ray shows changes of arthritis in the lower back and severe advanced arthritis in both hips, moderate on the right and mild to moderate on the left. She is advised to continue with hip check exercises and stretches. If her condition does not improve, she may need to consider physical therapy and consultation with a hip specialist.    4. Eye condition.  She visited urgent care for an eye issue and was prescribed eye drops, which she completed as directed. Her condition has returned to normal. She can use the eye drops for up to 1 year if needed.      Discussed lifestyle issues and health guidance given  Patient was given an after visit summary which includes diagnoses, vital signs, current medications, instructions and references & authorized prescriptions . Results of labs will be conveyed to patient, once available.  Pt verbalized instructions I provided and expressed understanding of discussion that was held today.    Please note that this dictation was completed with [employer], the [employer] voice recognition software.  Quite often unanticipated grammatical, syntax, homophones, and other interpretive errors are inadvertently transcribed by the computer software.  Please disregard these errors.  Please excuse any errors that have escaped final proofreading.  Thank you.     Return in about 6 months (around 12/12/2025) for follow up.     Electronically signed by Bing Murillo MD on 6/12/25 at 12:54 PM EDT

## 2025-06-13 LAB
25(OH)D3 SERPL-MCNC: 67.8 NG/ML (ref 30–100)
ALBUMIN SERPL-MCNC: 3.2 G/DL (ref 3.5–5)
ALBUMIN/GLOB SERPL: 0.8 (ref 1.1–2.2)
ALP SERPL-CCNC: 86 U/L (ref 45–117)
ALT SERPL-CCNC: 13 U/L (ref 12–78)
ANION GAP SERPL CALC-SCNC: 5 MMOL/L (ref 2–12)
APPEARANCE UR: CLEAR
AST SERPL-CCNC: 9 U/L (ref 15–37)
BACTERIA URNS QL MICRO: NEGATIVE /HPF
BASOPHILS # BLD: 0.04 K/UL (ref 0–0.1)
BASOPHILS NFR BLD: 1 % (ref 0–1)
BILIRUB SERPL-MCNC: 0.8 MG/DL (ref 0.2–1)
BILIRUB UR QL: NEGATIVE
BUN SERPL-MCNC: 10 MG/DL (ref 6–20)
BUN/CREAT SERPL: 13 (ref 12–20)
CALCIUM SERPL-MCNC: 9 MG/DL (ref 8.5–10.1)
CHLORIDE SERPL-SCNC: 109 MMOL/L (ref 97–108)
CHOLEST SERPL-MCNC: 191 MG/DL
CO2 SERPL-SCNC: 28 MMOL/L (ref 21–32)
COLOR UR: NORMAL
CREAT SERPL-MCNC: 0.77 MG/DL (ref 0.55–1.02)
DIFFERENTIAL METHOD BLD: ABNORMAL
EOSINOPHIL # BLD: 0.3 K/UL (ref 0–0.4)
EOSINOPHIL NFR BLD: 7.4 % (ref 0–7)
EPITH CASTS URNS QL MICRO: NORMAL /LPF
ERYTHROCYTE [DISTWIDTH] IN BLOOD BY AUTOMATED COUNT: 11.9 % (ref 11.5–14.5)
EST. AVERAGE GLUCOSE BLD GHB EST-MCNC: 103 MG/DL
GLOBULIN SER CALC-MCNC: 3.9 G/DL (ref 2–4)
GLUCOSE SERPL-MCNC: 91 MG/DL (ref 65–100)
GLUCOSE UR STRIP.AUTO-MCNC: NEGATIVE MG/DL
HBA1C MFR BLD: 5.2 % (ref 4–5.6)
HCT VFR BLD AUTO: 36.8 % (ref 35–47)
HDLC SERPL-MCNC: 53 MG/DL
HDLC SERPL: 3.6 (ref 0–5)
HGB BLD-MCNC: 12.5 G/DL (ref 11.5–16)
HGB UR QL STRIP: NEGATIVE
HYALINE CASTS URNS QL MICRO: NORMAL /LPF (ref 0–5)
IMM GRANULOCYTES # BLD AUTO: 0.01 K/UL (ref 0–0.04)
IMM GRANULOCYTES NFR BLD AUTO: 0.2 % (ref 0–0.5)
KETONES UR QL STRIP.AUTO: NEGATIVE MG/DL
LDLC SERPL CALC-MCNC: 125 MG/DL (ref 0–100)
LEUKOCYTE ESTERASE UR QL STRIP.AUTO: NEGATIVE
LYMPHOCYTES # BLD: 1.57 K/UL (ref 0.8–3.5)
LYMPHOCYTES NFR BLD: 38.9 % (ref 12–49)
MCH RBC QN AUTO: 32.3 PG (ref 26–34)
MCHC RBC AUTO-ENTMCNC: 34 G/DL (ref 30–36.5)
MCV RBC AUTO: 95.1 FL (ref 80–99)
MONOCYTES # BLD: 0.36 K/UL (ref 0–1)
MONOCYTES NFR BLD: 8.9 % (ref 5–13)
NEUTS SEG # BLD: 1.76 K/UL (ref 1.8–8)
NEUTS SEG NFR BLD: 43.6 % (ref 32–75)
NITRITE UR QL STRIP.AUTO: NEGATIVE
NRBC # BLD: 0 K/UL (ref 0–0.01)
NRBC BLD-RTO: 0 PER 100 WBC
PH UR STRIP: 8 (ref 5–8)
PLATELET # BLD AUTO: 248 K/UL (ref 150–400)
PMV BLD AUTO: 10.7 FL (ref 8.9–12.9)
POTASSIUM SERPL-SCNC: 4 MMOL/L (ref 3.5–5.1)
PROT SERPL-MCNC: 7.1 G/DL (ref 6.4–8.2)
PROT UR STRIP-MCNC: NEGATIVE MG/DL
RBC # BLD AUTO: 3.87 M/UL (ref 3.8–5.2)
RBC #/AREA URNS HPF: NORMAL /HPF (ref 0–5)
SODIUM SERPL-SCNC: 142 MMOL/L (ref 136–145)
SP GR UR REFRACTOMETRY: 1.01 (ref 1–1.03)
TRIGL SERPL-MCNC: 65 MG/DL
TSH SERPL DL<=0.05 MIU/L-ACNC: 2.45 UIU/ML (ref 0.36–3.74)
UROBILINOGEN UR QL STRIP.AUTO: 0.2 EU/DL (ref 0.2–1)
VLDLC SERPL CALC-MCNC: 13 MG/DL
WBC # BLD AUTO: 4 K/UL (ref 3.6–11)
WBC URNS QL MICRO: NORMAL /HPF (ref 0–4)

## 2025-06-14 ENCOUNTER — RESULTS FOLLOW-UP (OUTPATIENT)
Age: 60
End: 2025-06-14

## (undated) DEVICE — TOWEL SURG W17XL27IN STD BLU COT NONFENESTRATED PREWASHED

## (undated) DEVICE — GOWN,NON-REINFORCED,XXL: Brand: MEDLINE

## (undated) DEVICE — INFECTION CONTROL KIT SYS

## (undated) DEVICE — NEEDLE HYPO 22GA L1.5IN BLK S STL HUB POLYPR SHLD REG BVL

## (undated) DEVICE — SUTURE MCRYL SZ 4-0 L27IN ABSRB UD L19MM PS-2 1/2 CIR PRIM Y426H

## (undated) DEVICE — CURVED, SMALL JAW, OPEN SEALER/DIVIDER: Brand: LIGASURE

## (undated) DEVICE — SUT SLK 2-0SH 30IN BLK --

## (undated) DEVICE — SYR 10ML LUER LOK 1/5ML GRAD --

## (undated) DEVICE — INSULATED BLADE ELECTRODE: Brand: EDGE

## (undated) DEVICE — SURGICAL PROCEDURE PACK BASIN MAJ SET CUST NO CAUT

## (undated) DEVICE — SOLUTION IV 1000ML 0.9% SOD CHL

## (undated) DEVICE — DRAPE,REIN 53X77,STERILE: Brand: MEDLINE

## (undated) DEVICE — COVER US PRB W5XL96IN LTX W/ GEL

## (undated) DEVICE — TRAP FLUID BUFFALO FLTR

## (undated) DEVICE — DERMABOND SKIN ADH 0.7ML -- DERMABOND ADVANCED 12/BX

## (undated) DEVICE — SUTURE VCRL SZ 3-0 L27IN ABSRB UD L26MM SH 1/2 CIR J416H

## (undated) DEVICE — SMOKE EVACUATION PENCIL: Brand: VALLEYLAB

## (undated) DEVICE — PACK,BASIC,SIRUS,V: Brand: MEDLINE

## (undated) DEVICE — DRAPE,CHEST,FENES,15X10,STERIL: Brand: MEDLINE

## (undated) DEVICE — GARMENT,MEDLINE,DVT,INT,CALF,MED, GEN2: Brand: MEDLINE

## (undated) DEVICE — (D)PREP SKN CHLRAPRP APPL 26ML -- CONVERT TO ITEM 371833

## (undated) DEVICE — INTENDED FOR TISSUE SEPARATION, AND OTHER PROCEDURES THAT REQUIRE A SHARP SURGICAL BLADE TO PUNCTURE OR CUT.: Brand: BARD-PARKER ® CARBON RIB-BACK BLADES

## (undated) DEVICE — REM POLYHESIVE ADULT PATIENT RETURN ELECTRODE: Brand: VALLEYLAB

## (undated) DEVICE — HANDLE LT SNAP ON ULT DURABLE LENS FOR TRUMPF ALC DISPOSABLE

## (undated) DEVICE — STERILE POLYISOPRENE POWDER-FREE SURGICAL GLOVES: Brand: PROTEXIS